# Patient Record
Sex: FEMALE | Race: WHITE | Employment: OTHER | URBAN - METROPOLITAN AREA
[De-identification: names, ages, dates, MRNs, and addresses within clinical notes are randomized per-mention and may not be internally consistent; named-entity substitution may affect disease eponyms.]

---

## 2017-02-24 ENCOUNTER — ALLIED HEALTH/NURSE VISIT (OUTPATIENT)
Dept: NURSING | Facility: CLINIC | Age: 77
End: 2017-02-24
Payer: COMMERCIAL

## 2017-02-24 DIAGNOSIS — Z23 NEED FOR PROPHYLACTIC VACCINATION AND INOCULATION AGAINST COMBINATIONS OF DISEASE: Primary | ICD-10-CM

## 2017-02-24 PROCEDURE — 90662 IIV NO PRSV INCREASED AG IM: CPT

## 2017-02-24 PROCEDURE — G0008 ADMIN INFLUENZA VIRUS VAC: HCPCS

## 2017-02-24 PROCEDURE — 99207 ZZC NO CHARGE NURSE ONLY: CPT

## 2017-02-24 NOTE — MR AVS SNAPSHOT
After Visit Summary   2/24/2017    Lawrence Pemberton    MRN: [id number]           Patient Information     Date Of Birth          1940        Visit Information        Provider Department      2/24/2017 9:30 AM UP NURSE [facility]        Today's Diagnoses     Need for prophylactic vaccination and inoculation against combinations of disease    -  1       Follow-ups after your visit        Who to contact     If you have questions or need follow up information about today's clinic visit or your schedule please contact [facility] directly at [phone].  Normal or non-critical lab and imaging results will be communicated to you by Off Grid Electrichart, letter or phone within 4 business days after the clinic has received the results. If you do not hear from us within 7 days, please contact the clinic through Fuelmaxx Inct or phone. If you have a critical or abnormal lab result, we will notify you by phone as soon as possible.  Submit refill requests through Guzu or call your pharmacy and they will forward the refill request to us. Please allow 3 business days for your refill to be completed.          Additional Information About Your Visit        MyChart Information     Guzu gives you secure access to your electronic health record. If you see a primary care provider, you can also send messages to your care team and make appointments. If you have questions, please call your primary care clinic.  If you do not have a primary care provider, please call [phone] and they will assist you.        Care EveryWhere ID     This is your Care EveryWhere ID. This could be used by other organizations to access your [place] medical records  [id number]         Blood Pressure from Last 3 Encounters:   07/19/16 125/79   08/11/15 126/82   05/19/15 126/66    Weight from Last 3 Encounters:   07/19/16 157 lb (71.2 kg)   08/11/15 158 lb 4.8 oz (71.8 kg)   05/19/15 162 lb 4.8 oz (73.6 kg)              We  Performed the Following     FLU VACCINE, INCREASED ANTIGEN, PRESV FREE        Primary Care Provider Office Phone # Fax #    Roberta Delong [phone]       [facility] [address]  [facility] [address]        Thank you!     Thank you for choosing [facility] NURSE  for your care. Our goal is always to provide you with excellent care. Hearing back from our patients is one way we can continue to improve our services. Please take a few minutes to complete the written survey that you may receive in the mail after your visit with us. Thank you!             Your Updated Medication List - Protect others around you: Learn how to safely use, store and throw away your medicines at www.disposemymeds.org.          This list is accurate as of: 2/24/17 10:15 AM.  Always use your most recent med list.                   Brand Name Dispense Instructions for use    alendronate 35 MG tablet    FOSAMAX    12 tablet    Take 1 tablet (35 mg) by mouth every 7 days Take with over 8 ounces water and stay upright for at least 30 minutes after dose.  Take at least 30-60 minutes before breakfast       atorvastatin 20 MG tablet    LIPITOR    90 tablet    TAKE 1 TABLET BY MOUTH EVERY DAY       fluocinonide 0.05 % ointment    LIDEX    30 g    Apply sparingly to affected area twice daily for 14 days.  Do not apply to face.       PARoxetine 20 MG tablet    PAXIL    45 tablet    Take 0.5 tablets (10 mg) by mouth every morning

## 2017-02-24 NOTE — NURSING NOTE

## 2017-05-15 DIAGNOSIS — F33.1 MAJOR DEPRESSIVE DISORDER, RECURRENT, MODERATE (H): ICD-10-CM

## 2017-05-15 NOTE — TELEPHONE ENCOUNTER
Paxil     Last Written Prescription Date: 07/19/2016  Last Fill Quantity: 45, # refills: 3  Last Office Visit with [facility] primary care provider:  07/19/2016        Last PHQ-9 score on record=   PHQ-9 SCORE 7/19/2016   Total Score -   Total Score 0

## 2017-05-16 RX ORDER — PAROXETINE 20 MG/1
TABLET, FILM COATED ORAL
Qty: 45 TABLET | Refills: 3 | OUTPATIENT
Start: 2017-05-16

## 2017-09-01 DIAGNOSIS — E78.5 HYPERLIPIDEMIA WITH TARGET LDL LESS THAN 130: ICD-10-CM

## 2017-09-01 RX ORDER — ATORVASTATIN CALCIUM 20 MG/1
TABLET, FILM COATED ORAL
Qty: 30 TABLET | Refills: 0 | Status: SHIPPED | OUTPATIENT
Start: 2017-09-01 | End: 2017-10-14

## 2017-09-01 NOTE — TELEPHONE ENCOUNTER
Medication is being filled for 1 time refill only due to:  Patient needs to be seen because it has been more than one year since last visit.   Due for physical and fasting labs.  Dianna Lunsford RN    Atorvastatin  Last Written Prescription Date: 9/26/2016  Last Fill Quantity: 90, # refills: 2  Last Office Visit with [facility] or [facility] prescribing provider: 7/19/2016       Lab Results   Component Value Date    CHOL 159 08/05/2016     Lab Results   Component Value Date    HDL 48 08/05/2016     Lab Results   Component Value Date    LDL 93 08/05/2016     Lab Results   Component Value Date    TRIG 88 08/05/2016     Lab Results   Component Value Date    CHOLHDLRATIO 3.3 08/11/2015

## 2017-09-06 ENCOUNTER — TRANSFERRED RECORDS (OUTPATIENT)
Dept: HEALTH INFORMATION MANAGEMENT | Facility: CLINIC | Age: 77
End: 2017-09-06

## 2017-09-27 ENCOUNTER — OFFICE VISIT (OUTPATIENT)
Dept: FAMILY MEDICINE | Facility: CLINIC | Age: 77
End: 2017-09-27
Payer: COMMERCIAL

## 2017-09-27 ENCOUNTER — THERAPY VISIT (OUTPATIENT)
Dept: PHYSICAL THERAPY | Facility: CLINIC | Age: 77
End: 2017-09-27
Payer: COMMERCIAL

## 2017-09-27 VITALS
SYSTOLIC BLOOD PRESSURE: 128 MMHG | TEMPERATURE: 97.7 F | HEIGHT: 65 IN | DIASTOLIC BLOOD PRESSURE: 71 MMHG | WEIGHT: 156.1 LBS | HEART RATE: 77 BPM | BODY MASS INDEX: 26.01 KG/M2 | OXYGEN SATURATION: 97 %

## 2017-09-27 DIAGNOSIS — F33.1 MAJOR DEPRESSIVE DISORDER, RECURRENT, MODERATE (H): ICD-10-CM

## 2017-09-27 DIAGNOSIS — S29.012A UPPER BACK STRAIN, INITIAL ENCOUNTER: ICD-10-CM

## 2017-09-27 DIAGNOSIS — Z23 NEED FOR VACCINATION: ICD-10-CM

## 2017-09-27 DIAGNOSIS — M54.2 CERVICALGIA: ICD-10-CM

## 2017-09-27 DIAGNOSIS — M25.512 ACUTE PAIN OF LEFT SHOULDER: Primary | ICD-10-CM

## 2017-09-27 PROCEDURE — G8981 BODY POS CURRENT STATUS: HCPCS | Mod: GP | Performed by: PHYSICAL THERAPIST

## 2017-09-27 PROCEDURE — 97161 PT EVAL LOW COMPLEX 20 MIN: CPT | Mod: GP | Performed by: PHYSICAL THERAPIST

## 2017-09-27 PROCEDURE — 90662 IIV NO PRSV INCREASED AG IM: CPT | Performed by: FAMILY MEDICINE

## 2017-09-27 PROCEDURE — 97140 MANUAL THERAPY 1/> REGIONS: CPT | Mod: GP | Performed by: PHYSICAL THERAPIST

## 2017-09-27 PROCEDURE — 99214 OFFICE O/P EST MOD 30 MIN: CPT | Mod: 25 | Performed by: FAMILY MEDICINE

## 2017-09-27 PROCEDURE — 97110 THERAPEUTIC EXERCISES: CPT | Mod: GP | Performed by: PHYSICAL THERAPIST

## 2017-09-27 PROCEDURE — G8982 BODY POS GOAL STATUS: HCPCS | Mod: GP | Performed by: PHYSICAL THERAPIST

## 2017-09-27 PROCEDURE — G0008 ADMIN INFLUENZA VIRUS VAC: HCPCS | Performed by: FAMILY MEDICINE

## 2017-09-27 RX ORDER — PAROXETINE 20 MG/1
10 TABLET, FILM COATED ORAL EVERY MORNING
Qty: 45 TABLET | Refills: 3 | Status: SHIPPED | OUTPATIENT
Start: 2017-09-27 | End: 2017-12-20

## 2017-09-27 ASSESSMENT — PATIENT HEALTH QUESTIONNAIRE - PHQ9: SUM OF ALL RESPONSES TO PHQ QUESTIONS 1-9: 2

## 2017-09-27 NOTE — PROGRESS NOTES
SUBJECTIVE:   Lawrence Pemberton is a 76 year old female who presents to clinic today for the following health issues:      She reports the pain from  Left upper back- from  Neck to left shoulder, sharp pain, intermitenet  Has taken ibuprofen and ice and that seem to relief some of it  Started gradually after experiencing upper back strain and was twisting a lot rosalina at bedtime  recent long distance Ride in the car to [place]- did not help much   Lying down on left shoulder at night makes it worse  Reaching above the shoulder does not make it worse  The onset of shoulder pain coincide with mid upper back pain a few weeks prior  Then was twisting in bed more to find a comfortable position and feels might have hurt the should er pain  Moving the shoulder does not make it works and rather makes it better    She reports she is very active  Twice a week water aerobic with weights  Often biking during summer  And stays active in winter with cross country skiing        She reports ,she had PHYSICAL THERAPY for lumbar pain that helped but never got any for upper back    Shoulder Pain      Duration: x3 weeks    Description (location/character/radiation): LT Shoulder     Intensity:  moderate, severe at night     Accompanying signs and symptoms: shoulder more painful when sitting and lying down, tingling in LT hand radiating to LT elbow      History (similar episodes/previous evaluation): None with shoulder, has had MRI for previous back problems     Precipitating or alleviating factors: None    Therapies tried and outcome: ibuprofen and ice - helps with pain briefly         PROBLEMS TO ADD ON...    Problem list and histories reviewed & adjusted, as indicated.  Additional history: as documented    Labs reviewed in EPIC    Reviewed and updated as needed this visit by clinical staff     Reviewed and updated as needed this visit by Provider         ROS: has been on paxil for many years but lately taiking everyother day or once in 2-3  "days . paxil has helped with depression .  Constitutional, HEENT, cardiovascular, pulmonary, gi and gu systems are negative, except as otherwise noted.      OBJECTIVE:   /71  Pulse 77  Temp 97.7  F (36.5  C) (Oral)  Ht 5' 4.75\" (1.645 m)  Wt 156 lb 1.6 oz (70.8 kg)  SpO2 97%  BMI 26.18 kg/m2  Body mass index is 26.18 kg/(m^2).  GENERAL: healthy, alert and no distress  NECK: no adenopathy, no asymmetry, masses, or scars and thyroid normal to palpation  RESP: lungs clear to auscultation - no rales, rhonchi or wheezes  CV: regular rate and rhythm, normal S1 S2, no S3 or S4, no murmur, click or rub, no peripheral edema and peripheral pulses strong  ABDOMEN: soft, nontender, no hepatosplenomegaly, no masses and bowel sounds normal  Focal left shoulder exam: no deformity. Good ROM. No weakness . Good ROM  Negative nerve impingement sign  Rest of the MS: no gross musculoskeletal defects noted, no edema      ASSESSMENT/PLAN:     (M25.512) Acute pain of left shoulder  (primary encounter diagnosis)  (S29.012A) Upper back strain, initial encounter  Plan: not typically rotator cuff  Most likely strain- and PHYSICAL THERAPY should be helpful  [facility] PT, HAND, AND CHIROPRACTIC REFERRAL      Start PHYSICAL THERAPY   Follow up if not better  Continue with ice and ibuprofen as needed          Depression  PHQ-9 SCORE 5/19/2015 7/19/2016 9/27/2017   Total Score 0 - -   Total Score - 0 2     Takes paxil 1/2 tab in 2-3 days and it helps \"easier to live with\"  We discussed the benefit of taking medications consistently- she reports she needs refill and its sent to her pharmacy  And she is encouraged to follow up with primary care physicain           Paige Zamorano MD  [facility]  "

## 2017-09-27 NOTE — MR AVS SNAPSHOT
After Visit Summary   9/27/2017    Lawrence Pemberton    MRN: [id number]           Patient Information     Date Of Birth          1940        Visit Information        Provider Department      9/27/2017 12:40 PM Ivone Roman, PT [facility]        Today's Diagnoses     Acute pain of left shoulder    -  1    Cervicalgia           Follow-ups after your visit        Your next 10 appointments already scheduled     Oct 04, 2017  9:30 AM CDT   HEMANT Spine with Ivone Roman PT   [facility] ([facility]  )    [address]  [facility] [address]   [phone]            Oct 12, 2017 12:00 PM CDT   HEMANT Spine with Ivone Roman PT   [facility] ([facility]  )    [address]  [facility] [address]   [phone]            Oct 23, 2017  3:20 PM CDT   HEMANT Spine with Ivone Roman PT   [facility] ([facility]  )    [address]  [facility] [address]   [phone]              Who to contact     If you have questions or need follow up information about today's clinic visit or your schedule please contact [facility] PH[facility] THERAP[facility] directly at [phone].  Normal or non-critical lab and imaging results will be communicated to you by DataGravityhart, letter or phone within 4 business days after the clinic has received the results. If you do not hear from us within 7 days, please contact the clinic through DataGravityhart or phone. If you have a critical or abnormal lab result, we will notify you by phone as soon as possible.  Submit refill requests through [employer] or call your pharmacy and they will forward the refill request to us. Please allow 3 business days for your refill to be completed.          Additional Information About Your Visit        [facility]  Information     Yael gives you secure access to your electronic health record. If you see a primary care provider, you can also send messages to your care team and make appointments. If you have questions, please call your primary care clinic.  If you do not have a primary care provider, please call [phone] and they will assist you.        Care EveryWhere ID     This is your Care EveryWhere ID. This could be used by other organizations to access your [facility] medical records  [id number]         Blood Pressure from Last 3 Encounters:   09/27/17 128/71   07/19/16 125/79   08/11/15 126/82    Weight from Last 3 Encounters:   09/27/17 70.8 kg (156 lb 1.6 oz)   07/19/16 71.2 kg (157 lb)   08/11/15 71.8 kg (158 lb 4.8 oz)              We Performed the Following     HC PT EVAL, LOW COMPLEXITY     [facility] CERT REPORT     [facility] INITIAL EVAL REPORT     MANUAL THER TECH,1+REGIONS,EA 15 MIN     THERAPEUTIC EXERCISES          Where to get your medicines      These medications were sent to [id number] - [place] [address]     Phone:  [phone]     PARoxetine 20 MG tablet          Primary Care Provider Office Phone # Fax #    Roberta Delong [phone] [address] [address]        Equal Access to Services     FABIENNE ENNIS AH: Hadii aad ku hadasho Soomaali, waaxda luqadaha, qaybta kaalmada adeegyada, leah forrest haymatty cohen . So [facility] [phone].    ATENCIÓN: Si habla español, tiene a irving disposición servicios gratuitos de asistencia lingüística. Je al [phone].    We comply with applicable federal civil rights laws and [place] laws. We do not discriminate on the basis of race, color, national origin, age, disability sex, sexual orientation or gender identity.            Thank you!     Thank you for choosing [facility] PHYSICAL THERAPY  for your  care. Our goal is always to provide you with excellent care. Hearing back from our patients is one way we can continue to improve our services. Please take a few minutes to complete the written survey that you may receive in the mail after your visit with us. Thank you!             Your Updated Medication List - Protect others around you: Learn how to safely use, store and throw away your medicines at www.disposemymeds.org.          This list is accurate as of: 9/27/17  2:27 PM.  Always use your most recent med list.                   Brand Name Dispense Instructions for use Diagnosis    alendronate 35 MG tablet    FOSAMAX    12 tablet    Take 1 tablet (35 mg) by mouth every 7 days Take with over 8 ounces water and stay upright for at least 30 minutes after dose.  Take at least 30-60 minutes before breakfast    Osteopenia       atorvastatin 20 MG tablet    LIPITOR    30 tablet    TAKE 1 TABLET BY MOUTH EVERY DAY    Hyperlipidemia with target LDL less than 130       fluocinonide 0.05 % ointment    LIDEX    30 g    Apply sparingly to affected area twice daily for 14 days.  Do not apply to face.    Chigger bite       PARoxetine 20 MG tablet    PAXIL    45 tablet    Take 0.5 tablets (10 mg) by mouth every morning    Major depressive disorder, recurrent, moderate (H)

## 2017-09-27 NOTE — MR AVS SNAPSHOT
After Visit Summary   9/27/2017    Lawrence Pemberton    MRN: [id number]           Patient Information     Date Of Birth          1940        Visit Information        Provider Department      9/27/2017 11:00 AM Paige Zamorano MD [facility]        Today's Diagnoses     Acute pain of left shoulder    -  1    Upper back strain, initial encounter        Major depressive disorder, recurrent, moderate (H)        Need for vaccination          Care Instructions    PHYSICAL THERAPY [facility], [phone]     Start PHYSICAL THERAPY   Follow up if not better  Continue with ice and ibuprofen as needed            Follow-ups after your visit        Additional Services     [facility] PT, HAND, AND CHIROPRACTIC REFERRAL       **This order will print in the [facility] Scheduling Office**    Physical Therapy, Hand Therapy and Chiropractic Care are available through:    *[facility]  *[facility]  *[facility]    Call one number to schedule at any of the above locations: [phone].    Your provider has referred you to: Physical Therapy at [facility] or [facility]    Indication/Reason for Referral: left shoulder pain- more in upper shoulder posteriorly  Also mid back strain   Onset of Illness: 1 month  Therapy Orders: Evaluate and Treat  Special Programs: None  Special Request: None    Nomi Mosher      Additional Comments for the Therapist or Chiropractor:     Please be aware that coverage of these services is subject to the terms and limitations of your health insurance plan.  Call member services at your health plan with any benefit or coverage questions.      Please bring the following to your appointment:    *Your personal calendar for scheduling future appointments  *Comfortable clothing                  Your next 10 appointments already scheduled     Oct 04, 2017  9:30 AM CDT   HEMANT Spine with Ivone Roman PT   [facility] ([facility]  )    [address]  [facility] [address]   [phone]            Oct 12, 2017 12:00 PM CDT   HEMANT Spine with Ivone Roman, PT   [facility] ([facility]  )    [address]  [facility] [address]   [phone]            Oct 23, 2017  3:20 PM CDT   HEMANT Spine with Ivone Roman, PT   [facility] ([facility]  )    [address]  [facility] [address]   [phone]              Who to contact     If you have questions or need follow up information about today's clinic visit or your schedule please contact [facility] directly at [phone].  Normal or non-critical lab and imaging results will be communicated to you by LoiLohart, letter or phone within 4 business days after the clinic has received the results. If you do not hear from us within 7 days, please contact the clinic through [employer] or phone. If you have a critical or abnormal lab result, we will notify you by phone as soon as possible.  Submit refill requests through [employer] or call your pharmacy and they will forward the refill request to us. Please allow 3 business days for your refill to be completed.          Additional Information About Your Visit        [employer] Information     [employer] gives you secure access to your electronic health record. If you see a primary care provider, you can also send messages to your care team and make appointments. If you have questions, please call your primary care clinic.  If you do not have a primary care provider, please call [phone] and they will assist you.        Care EveryWhere ID     This is your Care EveryWhere ID. This could be used by other organizations to access your [place] medical records  [id number]        Your Vitals Were     Pulse Temperature Height Pulse Oximetry BMI (Body Mass Index)       77 97.7  " F (36.5  C) (Oral) 5' 4.75\" (1.645 m) 97% 26.18 kg/m2        Blood Pressure from Last 3 Encounters:   09/27/17 128/71   07/19/16 125/79   08/11/15 126/82    Weight from Last 3 Encounters:   09/27/17 156 lb 1.6 oz (70.8 kg)   07/19/16 157 lb (71.2 kg)   08/11/15 158 lb 4.8 oz (71.8 kg)              We Performed the Following     FLU VACCINE, INCREASED ANTIGEN, PRESV FREE     [facility] PT, HAND, AND CHIROPRACTIC REFERRAL          Where to get your medicines      These medications were sent to [id number] - [place] [id number] [place] [address]  [address] [place] [address]     Phone:  [phone]     PARoxetine 20 MG tablet          Primary Care Provider Office Phone # Fax #    Roberta Delong [phone] [address] [address]        Equal Access to Services     [facility]: Hadii aad ku hadasho Soomaali, waaxda luqadaha, qaybta kaalmada adeegyayumiko, leah cohen . So [facility] [phone].    ATENCIÓN: Si habla español, tiene a irving disposición servicios gratuitos de asistencia lingüística. [facility] [phone].    We comply with applicable federal civil rights laws and [place] laws. We do not discriminate on the basis of race, color, national origin, age, disability sex, sexual orientation or gender identity.            Thank you!     Thank you for choosing [facility] [place]  for your care. Our goal is always to provide you with excellent care. Hearing back from our patients is one way we can continue to improve our services. Please take a few minutes to complete the written survey that you may receive in the mail after your visit with us. Thank you!             Your Updated Medication List - Protect others around you: Learn how to safely use, store and throw away your medicines at www.disposemymeds.org.          This list is accurate as of: 9/27/17  6:13 PM.  Always use your most recent med list.                   " Brand Name Dispense Instructions for use Diagnosis    alendronate 35 MG tablet    FOSAMAX    12 tablet    Take 1 tablet (35 mg) by mouth every 7 days Take with over 8 ounces water and stay upright for at least 30 minutes after dose.  Take at least 30-60 minutes before breakfast    Osteopenia       atorvastatin 20 MG tablet    LIPITOR    30 tablet    TAKE 1 TABLET BY MOUTH EVERY DAY    Hyperlipidemia with target LDL less than 130       fluocinonide 0.05 % ointment    LIDEX    30 g    Apply sparingly to affected area twice daily for 14 days.  Do not apply to face.    Chigger bite       PARoxetine 20 MG tablet    PAXIL    45 tablet    Take 0.5 tablets (10 mg) by mouth every morning    Major depressive disorder, recurrent, moderate (H)

## 2017-09-27 NOTE — NURSING NOTE
"Chief Complaint   Patient presents with     Shoulder Pain       Initial /71  Pulse 77  Temp 97.7  F (36.5  C) (Oral)  Ht 5' 4.75\" (1.645 m)  Wt 156 lb 1.6 oz (70.8 kg)  SpO2 97%  BMI 26.18 kg/m2 Estimated body mass index is 26.18 kg/(m^2) as calculated from the following:    Height as of this encounter: 5' 4.75\" (1.645 m).    Weight as of this encounter: 156 lb 1.6 oz (70.8 kg).  Medication Reconciliation: complete      Health Maintenance that is potentially due pending provider review:  PHQ9    Completing PHQ9 today.    BURAK Armando  "

## 2017-09-27 NOTE — PROGRESS NOTES
"  SUBJECTIVE:   Lawrence Pemberton is a 76 year old female who presents to clinic today for the following health issues:  {Provider please address medication reconciliation discrepancies--rooming staff please delete if no med/rec issues}    {Superlists:151604}    {additional problems for provider to add:091080}    Problem list and histories reviewed & adjusted, as indicated.  Additional history: {NONE - AS DOCUMENTED:427611::\"as documented\"}    {HIST REVIEW/ LINKS 2:310204}    Reviewed and updated as needed this visit by clinical staff  Allergies  Meds       Reviewed and updated as needed this visit by Provider         {PROVIDER CHARTING PREFERENCE:773326}  "

## 2017-09-27 NOTE — PATIENT INSTRUCTIONS
PHYSICAL THERAPY [facility], [phone]     Start PHYSICAL THERAPY   Follow up if not better  Continue with ice and ibuprofen as needed

## 2017-09-27 NOTE — PROGRESS NOTES
[facility] Initial Evaluation  Subjective:    Patient is a 76 year old female presenting with rehab left shoulder hpi. The history is provided by the patient. No  was used.   Lawrence Pemberton is a 76 year old female with a left shoulder condition.  Condition occurred with:  Other.    This is a new condition  Left shoulder and upper back pain began on a long car trip a few weeks ago. Was also doing some cleaning and this aggravated as well.  Patient saw MD for this issue today, 9/27/2017.  Patient reports pain in the mid back tp start and then developed left shoulder pain.  Mostly has pain with lying on left side or on her back.  Has been on her bike 3-4 days a week and doing water exercises.  Really no pain on the bike.  Taking ibuprofen as needed and icing well.  Is planning a trip to [place] in March.  Has had a little tingling in her left hand, index finger.  Still doing some computer work for her .  .    Patient reports pain:  Upper trap.  Radiates to:  Cervical and hand.  Pain is described as shooting and aching and is intermittent and reported as 7/10.  Associated symptoms:  Numbness. Pain is the same all the time.  Symptoms are exacerbated by rest and certain positions and relieved by nothing.  Since onset symptoms are gradually worsening.  Special testing: none.                                                    Objective:    Standing Alignment:    Cervical/Thoracic:  Forward head (upper thoracic kyphosis)                                    Cervical/Thoracic Evaluation    AROM:  AROM Cervical:    Flexion:          Pain into left upper back  Extension:       Pain down left arm to left index finger at end range of motion  Rotation:         Left: limited to 50%     Right: limited to 50 %  Side Bend:      Left:     Right:       Headaches: none  Cervical Myotomes:  normal                  DTR's:  not assessed          Cervical Dermatomes:   normal                    Cervical Palpation:    Tenderness present at Left:    Upper Trap; Levator and Erector Spinae      Cervical Stability/Joint Clearing:        Negative:ALAR Ligament and TLA AP  Spinal Segmental Conclusions:    Level:  Hypo at T1, T2, T3 and T4             Shoulder Evaluation:  ROM:  AROM:  : no pain, slight limitation at end range flexion and abduction.                                  Strength:  normal                      Stability Testing:  not assessed      Special Tests:  not assessed                                           General     ROS    Assessment/Plan:      Patient is a 76 year old female with cervical complaints.    Patient has the following significant findings with corresponding treatment plan.                Diagnosis 1:  Shoulder/upper back pain  Pain -  hot/cold therapy, manual therapy, self management, education and home program  Decreased ROM/flexibility - manual therapy, therapeutic exercise and home program  Decreased function - therapeutic activities and home program  Impaired posture - neuro re-education    Therapy Evaluation Codes:   1) History comprised of:   Personal factors that impact the plan of care:      Age.    Comorbidity factors that impact the plan of care are:      Osteoarthritis.     Medications impacting care: None.  2) Examination of Body Systems comprised of:   Body structures and functions that impact the plan of care:      Cervical spine.   Activity limitations that impact the plan of care are:      Cooking, Driving, Reading/Computer work, Sports, Sleeping and Laying down.  3) Clinical presentation characteristics are:   Stable/Uncomplicated.  4) Decision-Making    Low complexity using standardized patient assessment instrument and/or measureable assessment of functional outcome.  Cumulative Therapy Evaluation is: Low complexity.    Previous and current functional limitations:  (See Goal Flow Sheet for this information)    Short term and Long term  goals: (See Goal Flow Sheet for this information)     Communication ability:  Patient appears to be able to clearly communicate and understand verbal and written communication and follow directions correctly.  Treatment Explanation - The following has been discussed with the patient:   RX ordered/plan of care  Anticipated outcomes  Possible risks and side effects  This patient would benefit from PT intervention to resume normal activities.   Rehab potential is good.    Frequency:  1 X week, once daily  Duration:  for 6 weeks  Discharge Plan:  Independent in home treatment program.  Reach maximal therapeutic benefit.    Please refer to the daily flowsheet for treatment today, total treatment time and time spent performing 1:1 timed codes.

## 2017-09-28 NOTE — PROGRESS NOTES
Subjective:    Patient is a 76 year old female presenting with rehab left ankle/foot hpi.                                      Pertinent medical history includes:  Osteoarthritis.  Medical allergies: no.  Other surgeries include:  Orthopedic surgery.  Current medications:  Meds to increase bone density and anti-depressants.  Current occupation is Semi retired  .                                    Objective:    System    Physical Exam    General     ROS    Assessment/Plan:

## 2017-10-04 ENCOUNTER — THERAPY VISIT (OUTPATIENT)
Dept: PHYSICAL THERAPY | Facility: CLINIC | Age: 77
End: 2017-10-04
Payer: COMMERCIAL

## 2017-10-04 DIAGNOSIS — M25.512 ACUTE PAIN OF LEFT SHOULDER: ICD-10-CM

## 2017-10-04 DIAGNOSIS — M54.2 CERVICALGIA: ICD-10-CM

## 2017-10-04 PROCEDURE — 97110 THERAPEUTIC EXERCISES: CPT | Mod: GP | Performed by: PHYSICAL THERAPIST

## 2017-10-04 PROCEDURE — 97140 MANUAL THERAPY 1/> REGIONS: CPT | Mod: GP | Performed by: PHYSICAL THERAPIST

## 2017-10-12 ENCOUNTER — THERAPY VISIT (OUTPATIENT)
Dept: PHYSICAL THERAPY | Facility: CLINIC | Age: 77
End: 2017-10-12
Payer: COMMERCIAL

## 2017-10-12 DIAGNOSIS — M25.512 ACUTE PAIN OF LEFT SHOULDER: ICD-10-CM

## 2017-10-12 DIAGNOSIS — M54.2 CERVICALGIA: ICD-10-CM

## 2017-10-12 PROCEDURE — 97110 THERAPEUTIC EXERCISES: CPT | Mod: GP | Performed by: PHYSICAL THERAPIST

## 2017-10-12 PROCEDURE — 97140 MANUAL THERAPY 1/> REGIONS: CPT | Mod: GP | Performed by: PHYSICAL THERAPIST

## 2017-10-12 NOTE — MR AVS SNAPSHOT
After Visit Summary   10/12/2017    Lawrence Pemberton    MRN: [id number]           Patient Information     Date Of Birth          1940        Visit Information        Provider Department      10/12/2017 12:00 PM Ivone Roman, PT [facility] Physical Therapy        Today's Diagnoses     Acute pain of left shoulder        Cervicalgia           Follow-ups after your visit        Your next 10 appointments already scheduled     Oct 23, 2017  3:20 PM CDT   HEMANT Spine with Ivone Roman PT   [facility] Therapy ([facility]  )    [address]  [facility] [address] [phone]            Oct 30, 2017  3:20 PM CDT   HEMANT Spine with Ivone Roman PT   [facility] ([facility]  )    [address]  [facility] [id number] [phone]              Who to contact     If you have questions or need follow up information about today's clinic visit or your schedule please contact [facility] PH[facility] THERAP[facility] directly at [phone].  Normal or non-critical lab and imaging results will be communicated to you by [employer], letter or phone within 4 business days after the clinic has received the results. If you do not hear from us within 7 days, please contact the clinic through [employer] or phone. If you have a critical or abnormal lab result, we will notify you by phone as soon as possible.  Submit refill requests through [employer] or call your pharmacy and they will forward the refill request to us. Please allow 3 business days for your refill to be completed.          Additional Information About Your Visit        [employer] Information     [employer] gives you secure access to your electronic health record. If you see a primary care provider, you can also send messages to your care team and make appointments. If you have questions, please call  your primary care clinic.  If you do not have a primary care provider, please call [phone] and they will assist you.        Care EveryWhere ID     This is your Care EveryWhere ID. This could be used by other organizations to access your [place] medical records  [id number]         Blood Pressure from Last 3 Encounters:   09/27/17 128/71   07/19/16 125/79   08/11/15 126/82    Weight from Last 3 Encounters:   09/27/17 70.8 kg (156 lb 1.6 oz)   07/19/16 71.2 kg (157 lb)   08/11/15 71.8 kg (158 lb 4.8 oz)              Today, you had the following     No orders found for display       Primary Care Provider Office Phone # Fax #    Roberta Delong [phone] [address] [address]        Equal Access to Services     [facility]: Hadii aad ku hadasho Soomaali, waaxda luqadaha, qaybta kaalmada adeegyada, waxay adriane haykatien zaheer cohen . So [facility] [phone].    ATENCIÓN: Si habla español, tiene a irving disposición servicios gratuitos de asistencia lingüística. Je al [phone].    We comply with applicable federal civil rights laws and [place] laws. We do not discriminate on the basis of race, color, national origin, age, disability, sex, sexual orientation, or gender identity.            Thank you!     Thank you for choosing [facility]  for your care. Our goal is always to provide you with excellent care. Hearing back from our patients is one way we can continue to improve our services. Please take a few minutes to complete the written survey that you may receive in the mail after your visit with us. Thank you!             Your Updated Medication List - Protect others around you: Learn how to safely use, store and throw away your medicines at www.disposemymeds.org.          This list is accurate as of: 10/12/17  1:51 PM.  Always use your most recent med list.                   Brand Name Dispense  Instructions for use Diagnosis    alendronate 35 MG tablet    FOSAMAX    12 tablet    Take 1 tablet (35 mg) by mouth every 7 days Take with over 8 ounces water and stay upright for at least 30 minutes after dose.  Take at least 30-60 minutes before breakfast    Osteopenia       atorvastatin 20 MG tablet    LIPITOR    30 tablet    TAKE 1 TABLET BY MOUTH EVERY DAY    Hyperlipidemia with target LDL less than 130       fluocinonide 0.05 % ointment    LIDEX    30 g    Apply sparingly to affected area twice daily for 14 days.  Do not apply to face.    Chigger bite       PARoxetine 20 MG tablet    PAXIL    45 tablet    Take 0.5 tablets (10 mg) by mouth every morning    Major depressive disorder, recurrent, moderate (H)

## 2017-10-14 DIAGNOSIS — M85.80 OSTEOPENIA: ICD-10-CM

## 2017-10-14 DIAGNOSIS — E78.5 HYPERLIPIDEMIA WITH TARGET LDL LESS THAN 130: ICD-10-CM

## 2017-10-16 NOTE — TELEPHONE ENCOUNTER
Left non detailed VM for pt asking that they callback and schedule (physical with PCP)  Margaret LEE RN    Pending Prescriptions:                       Disp   Refills    alendronate (FOSAMAX) 35 MG tablet [Pharma*12 tab*3        Sig: TAKE 1 TABLET BY MOUTH EACH WEEK.TAKE WITH [id number]           BEFORE FOOD,DRINK& MEDS. STAY UPRIGHT 30MIN    atorvastatin (LIPITOR) 20 MG tablet [Pharm*30 tab*0        Sig: TAKE 1 TABLET BY MOUTH EVERY DAY    Alendronate    Last Written Prescription Date: 8/18/2016  Last Fill Quantity: 12, # refills: 3  Last Office Visit with [facility], [facility] or [facility] prescribing provider: 9/27/2017       DEXA Scan:  Last order of DX HIP/PELVIS/SPINE was found on 6/1/2015 from Hospital Encounter on 6/1/2015     No order of DX HIP/PELVIS/SPINE W LAT FRACTION ANALYSIS is found.       Creatinine   Date Value Ref Range Status   08/05/2016 0.80 0.52 - 1.04 mg/dL Final       Atorvastatin       Last Written Prescription Date: 9/1/2017  Last Fill Quantity: 30, # refills: 0    Last Office Visit with [facility], [facility] or [facility] prescribing provider:  9/27/2017   Future Office Visit:      Cholesterol   Date Value Ref Range Status   08/05/2016 159 <200 mg/dL Final     HDL Cholesterol   Date Value Ref Range Status   08/05/2016 48 (L) >49 mg/dL Final     LDL Cholesterol Calculated   Date Value Ref Range Status   08/05/2016 93 <100 mg/dL Final     Comment:     Desirable:       <100 mg/dl     Triglycerides   Date Value Ref Range Status   08/05/2016 88 <150 mg/dL Final     Cholesterol/HDL Ratio   Date Value Ref Range Status   08/11/2015 3.3 0.0 - 5.0 Final     ALT   Date Value Ref Range Status   08/05/2016 34 0 - 50 U/L Final

## 2017-10-19 ENCOUNTER — TELEPHONE (OUTPATIENT)
Dept: FAMILY MEDICINE | Facility: CLINIC | Age: 77
End: 2017-10-19

## 2017-10-19 RX ORDER — ALENDRONATE SODIUM 35 MG/1
TABLET ORAL
Qty: 4 TABLET | Refills: 0 | Status: SHIPPED | OUTPATIENT
Start: 2017-10-19 | End: 2017-11-14

## 2017-10-19 RX ORDER — ATORVASTATIN CALCIUM 20 MG/1
TABLET, FILM COATED ORAL
Qty: 30 TABLET | Refills: 0 | Status: SHIPPED | OUTPATIENT
Start: 2017-10-19 | End: 2017-11-17

## 2017-10-19 NOTE — TELEPHONE ENCOUNTER
Medication is being filled for 1 time refill only due to:  upcoming appt   Margaret LEE RN    Next 5 appointments (look out 90 days)     Nov 14, 2017  7:30 AM CST   PHYSICAL with Roberta Delong MD   [facility] ([facility])    [address] [facility] [address]   [phone]

## 2017-10-23 ENCOUNTER — THERAPY VISIT (OUTPATIENT)
Dept: PHYSICAL THERAPY | Facility: CLINIC | Age: 77
End: 2017-10-23
Payer: COMMERCIAL

## 2017-10-23 DIAGNOSIS — M54.2 CERVICALGIA: ICD-10-CM

## 2017-10-23 DIAGNOSIS — M25.512 ACUTE PAIN OF LEFT SHOULDER: ICD-10-CM

## 2017-10-23 PROCEDURE — 97110 THERAPEUTIC EXERCISES: CPT | Mod: GP | Performed by: PHYSICAL THERAPIST

## 2017-10-23 PROCEDURE — 97140 MANUAL THERAPY 1/> REGIONS: CPT | Mod: GP | Performed by: PHYSICAL THERAPIST

## 2017-10-30 ENCOUNTER — THERAPY VISIT (OUTPATIENT)
Dept: PHYSICAL THERAPY | Facility: CLINIC | Age: 77
End: 2017-10-30
Payer: COMMERCIAL

## 2017-10-30 DIAGNOSIS — M25.512 ACUTE PAIN OF LEFT SHOULDER: ICD-10-CM

## 2017-10-30 DIAGNOSIS — M54.2 CERVICALGIA: ICD-10-CM

## 2017-10-30 PROCEDURE — 97535 SELF CARE MNGMENT TRAINING: CPT | Mod: GP | Performed by: PHYSICAL THERAPIST

## 2017-10-30 PROCEDURE — 97110 THERAPEUTIC EXERCISES: CPT | Mod: GP | Performed by: PHYSICAL THERAPIST

## 2017-10-30 PROCEDURE — 97112 NEUROMUSCULAR REEDUCATION: CPT | Mod: GP | Performed by: PHYSICAL THERAPIST

## 2017-10-30 NOTE — PROGRESS NOTES
Subjective:    HPI                    Objective:    System    Physical Exam    General     ROS    Assessment/Plan:      PROGRESS  REPORT    Progress reporting period is from 9/27/2017 to 10/3/2017.       SUBJECTIVE  Subjective changes noted by patient:  Feeling good today, and feels she has become stronger and has been having less tingling since the start of therapy. Therapy did not exacerbate symptoms today.    Current pain level is 3/10.     Previous pain level was 7/10.   Changes in function:  Yes, is sleeping a majority of the night, is able to complete a majority of the activities she wants, and is able to live an active lifestyle without much difficulty.  Adverse reaction to treatment or activity: None    OBJECTIVE  Changes noted in objective findings: Completed all exercises with new modifications. Patient stated understanding to all changes and to all education. Posture has improved, but forward head is still present, but patient is able to correct self. Strength in left arm is now equal to or greater than right arm other than external rotation, but this has also improved.     ASSESSMENT/PLAN  Updated problem list and treatment plan: Diagnosis 1:  Left shoulder pain  Pain -  hot/cold therapy, US, manual therapy, self management, education and home program  Decreased strength - therapeutic exercise, therapeutic activities and home program  Neurological symptoms (tingling) - postural re-education  Diagnosis 2:  Cervical pain   Pain -  hot/cold therapy, US, manual therapy, self management, education and home program  Decreased ROM/flexibility - manual therapy, therapeutic exercise and home program  Impaired posture - neuro re-education and home program  STG/LTGs have been met or progress has been made towards goals:  Yes (See Goal flow sheet completed today.)  Assessment of Progress: The patient's condition is improving.  Self Management Plans:  Patient has been instructed in a home treatment program.  Patient   has been instructed in self management of symptoms.  I have re-evaluated this patient and find that the nature, scope, duration and intensity of the therapy is appropriate for the medical condition of the patient.  Christopherig continues to require the following intervention to meet STG and LTG's:  PT    Recommendations:  This patient would benefit from continued therapy.     Frequency:  1 X week, once daily  Duration:  for 6 weeks          Please refer to the daily flowsheet for treatment today, total treatment time and time spent performing 1:1 timed codes.

## 2017-10-30 NOTE — MR AVS SNAPSHOT
After Visit Summary   10/30/2017    Lawrence Pemberton    MRN: [id number]           Patient Information     Date Of Birth          1940        Visit Information        Provider Department      10/30/2017 3:20 PM Ivone Roman PT [facility]        Today's Diagnoses     Acute pain of left shoulder        Cervicalgia           Follow-ups after your visit        Your next 10 appointments already scheduled     Nov 14, 2017  7:30 AM CST   PHYSICAL with Roberta Delong MD   [facility] ([facility])    [address] [address] [phone]              Who to contact     If you have questions or need follow up information about today's clinic visit or your schedule please contact [facility] directly at [phone].  Normal or non-critical lab and imaging results will be communicated to you by MyChart, letter or phone within 4 business days after the clinic has received the results. If you do not hear from us within 7 days, please contact the clinic through [employer] or phone. If you have a critical or abnormal lab result, we will notify you by phone as soon as possible.  Submit refill requests through [employer] or call your pharmacy and they will forward the refill request to us. Please allow 3 business days for your refill to be completed.          Additional Information About Your Visit        MyChart Information     [employer] gives you secure access to your electronic health record. If you see a primary care provider, you can also send messages to your care team and make appointments. If you have questions, please call your primary care clinic.  If you do not have a primary care provider, please call [phone] and they will assist you.        Care EveryWhere ID     This is your Care EveryWhere ID. This could be used by other organizations to access  your [place] medical records  [id number]         Blood Pressure from Last 3 Encounters:   09/27/17 128/71   07/19/16 125/79   08/11/15 126/82    Weight from Last 3 Encounters:   09/27/17 70.8 kg (156 lb 1.6 oz)   07/19/16 71.2 kg (157 lb)   08/11/15 71.8 kg (158 lb 4.8 oz)              We Performed the Following     HEMANT Progress Notes Report     Neuromuscular Re-Education     Self Care Management Training     Therapeutic Exercises        Primary Care Provider Office Phone # Fax #    Roberta Delong [phone] [address] [address]        Equal Access to Services     FABIENNE ENNIS : Hadii hermann bermudez hadasho Sohoracio, waaxda luqadaha, qaybta kaalmada adeegyada, leah cohen . So [facility] [phone].    ATENCIÓN: Si habla español, tiene a irving disposición servicios gratuitos de asistencia lingüística. Llame al [phone].    We comply with applicable federal civil rights laws and [place] laws. We do not discriminate on the basis of race, color, national origin, age, disability, sex, sexual orientation, or gender identity.            Thank you!     Thank you for choosing [facility] PHYSICAL THERAP[facility]  for your care. Our goal is always to provide you with excellent care. Hearing back from our patients is one way we can continue to improve our services. Please take a few minutes to complete the written survey that you may receive in the mail after your visit with us. Thank you!             Your Updated Medication List - Protect others around you: Learn how to safely use, store and throw away your medicines at www.disposemymeds.org.          This list is accurate as of: 10/30/17  4:26 PM.  Always use your most recent med list.                   Brand Name Dispense Instructions for use Diagnosis    alendronate 35 MG tablet    FOSAMAX    4 tablet    TAKE 1 TABLET BY MOUTH EACH WEEK.TAKE WITH [id number] BEFORE  FOOD,DRINK& MEDS. STAY UPRIGHT 30MIN    Osteopenia       atorvastatin 20 MG tablet    LIPITOR    30 tablet    TAKE 1 TABLET BY MOUTH EVERY DAY    Hyperlipidemia with target LDL less than 130       fluocinonide 0.05 % ointment    LIDEX    30 g    Apply sparingly to affected area twice daily for 14 days.  Do not apply to face.    Chigger bite       PARoxetine 20 MG tablet    PAXIL    45 tablet    Take 0.5 tablets (10 mg) by mouth every morning    Major depressive disorder, recurrent, moderate (H)

## 2017-11-14 ENCOUNTER — OFFICE VISIT (OUTPATIENT)
Dept: FAMILY MEDICINE | Facility: CLINIC | Age: 77
End: 2017-11-14
Payer: COMMERCIAL

## 2017-11-14 VITALS
BODY MASS INDEX: 25.49 KG/M2 | SYSTOLIC BLOOD PRESSURE: 116 MMHG | TEMPERATURE: 96.9 F | HEIGHT: 65 IN | WEIGHT: 153 LBS | OXYGEN SATURATION: 96 % | DIASTOLIC BLOOD PRESSURE: 73 MMHG | HEART RATE: 74 BPM

## 2017-11-14 DIAGNOSIS — C44.311 BASAL CELL CARCINOMA OF SKIN OF NOSE: ICD-10-CM

## 2017-11-14 DIAGNOSIS — F41.1 ANXIETY STATE: ICD-10-CM

## 2017-11-14 DIAGNOSIS — M47.819 FACET ARTHROPATHY OF SPINE: ICD-10-CM

## 2017-11-14 DIAGNOSIS — Z00.00 ENCOUNTER FOR ROUTINE ADULT HEALTH EXAMINATION WITHOUT ABNORMAL FINDINGS: Primary | ICD-10-CM

## 2017-11-14 DIAGNOSIS — F33.1 MAJOR DEPRESSIVE DISORDER, RECURRENT, MODERATE (H): ICD-10-CM

## 2017-11-14 DIAGNOSIS — Z23 NEED FOR TDAP VACCINATION: ICD-10-CM

## 2017-11-14 DIAGNOSIS — M85.80 OSTEOPENIA, UNSPECIFIED LOCATION: ICD-10-CM

## 2017-11-14 LAB
ALBUMIN SERPL-MCNC: 4 G/DL (ref 3.4–5)
ALP SERPL-CCNC: 74 U/L (ref 40–150)
ALT SERPL W P-5'-P-CCNC: 29 U/L (ref 0–50)
ANION GAP SERPL CALCULATED.3IONS-SCNC: 8 MMOL/L (ref 3–14)
AST SERPL W P-5'-P-CCNC: 25 U/L (ref 0–45)
BILIRUB SERPL-MCNC: 0.6 MG/DL (ref 0.2–1.3)
BUN SERPL-MCNC: 22 MG/DL (ref 7–30)
CALCIUM SERPL-MCNC: 9.4 MG/DL (ref 8.5–10.1)
CHLORIDE SERPL-SCNC: 107 MMOL/L (ref 94–109)
CHOLEST SERPL-MCNC: 163 MG/DL
CO2 SERPL-SCNC: 25 MMOL/L (ref 20–32)
CREAT SERPL-MCNC: 0.87 MG/DL (ref 0.52–1.04)
DEPRECATED CALCIDIOL+CALCIFEROL SERPL-MC: 26 UG/L (ref 20–75)
GFR SERPL CREATININE-BSD FRML MDRD: 63 ML/MIN/1.7M2
GLUCOSE SERPL-MCNC: 89 MG/DL (ref 70–99)
HDLC SERPL-MCNC: 51 MG/DL
LDLC SERPL CALC-MCNC: 95 MG/DL
NONHDLC SERPL-MCNC: 112 MG/DL
POTASSIUM SERPL-SCNC: 4.9 MMOL/L (ref 3.4–5.3)
PROT SERPL-MCNC: 7.7 G/DL (ref 6.8–8.8)
SODIUM SERPL-SCNC: 140 MMOL/L (ref 133–144)
TRIGL SERPL-MCNC: 87 MG/DL

## 2017-11-14 PROCEDURE — 80053 COMPREHEN METABOLIC PANEL: CPT | Performed by: FAMILY MEDICINE

## 2017-11-14 PROCEDURE — 80061 LIPID PANEL: CPT | Performed by: FAMILY MEDICINE

## 2017-11-14 PROCEDURE — 82306 VITAMIN D 25 HYDROXY: CPT | Performed by: FAMILY MEDICINE

## 2017-11-14 PROCEDURE — 99397 PER PM REEVAL EST PAT 65+ YR: CPT | Mod: 25 | Performed by: FAMILY MEDICINE

## 2017-11-14 PROCEDURE — 90471 IMMUNIZATION ADMIN: CPT | Performed by: FAMILY MEDICINE

## 2017-11-14 PROCEDURE — 36415 COLL VENOUS BLD VENIPUNCTURE: CPT | Performed by: FAMILY MEDICINE

## 2017-11-14 PROCEDURE — 90715 TDAP VACCINE 7 YRS/> IM: CPT | Performed by: FAMILY MEDICINE

## 2017-11-14 RX ORDER — ALENDRONATE SODIUM 35 MG/1
TABLET ORAL
Qty: 12 TABLET | Refills: 3 | Status: SHIPPED | OUTPATIENT
Start: 2017-11-14 | End: 2018-11-16

## 2017-11-14 ASSESSMENT — ANXIETY QUESTIONNAIRES
IF YOU CHECKED OFF ANY PROBLEMS ON THIS QUESTIONNAIRE, HOW DIFFICULT HAVE THESE PROBLEMS MADE IT FOR YOU TO DO YOUR WORK, TAKE CARE OF THINGS AT HOME, OR GET ALONG WITH OTHER PEOPLE: NOT DIFFICULT AT ALL
5. BEING SO RESTLESS THAT IT IS HARD TO SIT STILL: NOT AT ALL
1. FEELING NERVOUS, ANXIOUS, OR ON EDGE: SEVERAL DAYS
7. FEELING AFRAID AS IF SOMETHING AWFUL MIGHT HAPPEN: SEVERAL DAYS
3. WORRYING TOO MUCH ABOUT DIFFERENT THINGS: SEVERAL DAYS
GAD7 TOTAL SCORE: 4
6. BECOMING EASILY ANNOYED OR IRRITABLE: NOT AT ALL
2. NOT BEING ABLE TO STOP OR CONTROL WORRYING: SEVERAL DAYS

## 2017-11-14 ASSESSMENT — PATIENT HEALTH QUESTIONNAIRE - PHQ9
5. POOR APPETITE OR OVEREATING: NOT AT ALL
SUM OF ALL RESPONSES TO PHQ QUESTIONS 1-9: 0

## 2017-11-14 NOTE — MR AVS SNAPSHOT
After Visit Summary   11/14/2017    Lawrence Pemberton    MRN: [id number]           Patient Information     Date Of Birth          1940        Visit Information        Provider Department      11/14/2017 7:30 AM Roberta Delong MD [facility]        Today's Diagnoses     Encounter for routine adult health examination without abnormal findings    -  1    Facet arthropathy of spine        Osteopenia, unspecified location        Anxiety state        Major depressive disorder, recurrent, moderate (H)        Basal cell carcinoma of skin of nose        Need for Tdap vaccination          Care Instructions      Preventive Health Recommendations    Female Ages 65 +    Yearly exam:     See your health care provider every year in order to  o Review health changes.   o Discuss preventive care.    o Review your medicines if your doctor has prescribed any.      You no longer need a yearly Pap test unless you've had an abnormal Pap test in the past 10 years. If you have vaginal symptoms, such as bleeding or discharge, be sure to talk with your provider about a Pap test.      Every 1 to 2 years, have a mammogram.  If you are over 69, talk with your health care provider about whether or not you want to continue having screening mammograms.      Every 10 years, have a colonoscopy. Or, have a yearly FIT test (stool test). These exams will check for colon cancer.       Have a cholesterol test every 5 years, or more often if your doctor advises it.       Have a diabetes test (fasting glucose) every three years. If you are at risk for diabetes, you should have this test more often.       At age 65, have a bone density scan (DEXA) to check for osteoporosis (brittle bone disease).    Shots:    Get a flu shot each year.    Get a tetanus shot every 10 years.    Talk to your doctor about your pneumonia vaccines. There are now two you should receive - Pneumovax (PPSV 23) and Prevnar (PCV 13).    Talk to your  doctor about the shingles vaccine.    Talk to your doctor about the hepatitis B vaccine.    Nutrition:     Eat at least 5 servings of fruits and vegetables each day.      Eat whole-grain bread, whole-wheat pasta and brown rice instead of white grains and rice.      Talk to your provider about Calcium and Vitamin D.     Lifestyle    Exercise at least 150 minutes a week (30 minutes a day, 5 days a week). This will help you control your weight and prevent disease.      Limit alcohol to one drink per day.      No smoking.       Wear sunscreen to prevent skin cancer.       See your dentist twice a year for an exam and cleaning.      See your eye doctor every 1 to 2 years to screen for conditions such as glaucoma, macular degeneration and cataracts.          Follow-ups after your visit        Additional Services     DERMATOLOGY REFERRAL       Your provider has referred you to: [facility]: [facility] [place] [phone]  [email or web address]  [facility]: [facility] [phone]   [email or web address]    Please be aware that coverage of these services is subject to the terms and limitations of your health insurance plan.  Call member services at your health plan with any benefit or coverage questions.      Please bring the following with you to your appointment:    (1) Any X-Rays, CTs or MRIs which have been performed.  Contact the facility where they were done to arrange for  prior to your scheduled appointment.    (2) List of current medications  (3) This referral request   (4) Any documents/labs given to you for this referral                  Who to contact     If you have questions or need follow up information about today's clinic visit or your schedule please contact [facility] directly at [phone].  Normal or non-critical lab and imaging results will be communicated to you by MyChart, letter or phone within 4 business days after the clinic has  "received the results. If you do not hear from us within 7 days, please contact the clinic through [employer] or phone. If you have a critical or abnormal lab result, we will notify you by phone as soon as possible.  Submit refill requests through [employer] or call your pharmacy and they will forward the refill request to us. Please allow 3 business days for your refill to be completed.          Additional Information About Your Visit        [employer] Information     [employer] gives you secure access to your electronic health record. If you see a primary care provider, you can also send messages to your care team and make appointments. If you have questions, please call your primary care clinic.  If you do not have a primary care provider, please call [phone] and they will assist you.        Care EveryWhere ID     This is your Care EveryWhere ID. This could be used by other organizations to access your [place] medical records  [id number]        Your Vitals Were     Pulse Temperature Height Pulse Oximetry BMI (Body Mass Index)       74 96.9  F (36.1  C) (Oral) 5' 4.75\" (1.645 m) 96% 25.66 kg/m2        Blood Pressure from Last 3 Encounters:   11/14/17 116/73   09/27/17 128/71   07/19/16 125/79    Weight from Last 3 Encounters:   11/14/17 153 lb (69.4 kg)   09/27/17 156 lb 1.6 oz (70.8 kg)   07/19/16 157 lb (71.2 kg)              We Performed the Following     Comprehensive metabolic panel (BMP + Alb, Alk Phos, ALT, AST, Total. Bili, TP)     DERMATOLOGY REFERRAL     Lipid panel reflex to direct LDL Fasting     PAF COMPLETED     TDAP VACCINE (ADACEL)     Vitamin D Deficiency          Today's Medication Changes          These changes are accurate as of: 11/14/17  8:03 AM.  If you have any questions, ask your nurse or doctor.               These medicines have changed or have updated prescriptions.        Dose/Directions    alendronate 35 MG tablet   Commonly known as:  FOSAMAX   This may have changed:  See the new " instructions.   Used for:  Osteopenia, unspecified location   Changed by:  Roberta Delong MD        TAKE 1 TABLET BY MOUTH EACH WEEK.TAKE WITH [id number] BEFORE FOOD,DRINK& MEDS. STAY UPRIGHT 30MIN   Quantity:  12 tablet   Refills:  3            Where to get your medicines      These medications were sent to [id number] - [facility] [address] [facility]  [address] [facility] [address]     Phone:  [phone]     alendronate 35 MG tablet                Primary Care Provider Office Phone # Fax #    Roberta Delong [phone] [address] [address]        Equal Access to Services     [facility]: Hadii aad ku hadasho Soomaali, waaxda luqadaha, qaybta kaalmada adeegyada, waxay jazlynin haymatty cohen . So [facility] [phone].    ATENCIÓN: Si habla español, tiene a irving disposición servicios gratuitos de asistencia lingüística. [facility] [phone].    We comply with applicable federal civil rights laws and [place] laws. We do not discriminate on the basis of race, color, national origin, age, disability, sex, sexual orientation, or gender identity.            Thank you!     Thank you for choosing [facility]  for your care. Our goal is always to provide you with excellent care. Hearing back from our patients is one way we can continue to improve our services. Please take a few minutes to complete the written survey that you may receive in the mail after your visit with us. Thank you!             Your Updated Medication List - Protect others around you: Learn how to safely use, store and throw away your medicines at www.disposemymeds.org.          This list is accurate as of: 11/14/17  8:03 AM.  Always use your most recent med list.                   Brand Name Dispense Instructions for use Diagnosis    alendronate 35 MG tablet    FOSAMAX    12 tablet    TAKE 1 TABLET BY MOUTH EACH WEEK.TAKE WITH [id number]  BEFORE FOOD,DRINK& MEDS. STAY UPRIGHT 30MIN    Osteopenia, unspecified location       atorvastatin 20 MG tablet    LIPITOR    30 tablet    TAKE 1 TABLET BY MOUTH EVERY DAY    Hyperlipidemia with target LDL less than 130       fluocinonide 0.05 % ointment    LIDEX    30 g    Apply sparingly to affected area twice daily for 14 days.  Do not apply to face.    Chigger bite       PARoxetine 20 MG tablet    PAXIL    45 tablet    Take 0.5 tablets (10 mg) by mouth every morning    Major depressive disorder, recurrent, moderate (H)

## 2017-11-14 NOTE — NURSING NOTE
"Chief Complaint   Patient presents with     Medicare Visit       Initial /73  Pulse 74  Temp 96.9  F (36.1  C) (Oral)  Ht 5' 4.75\" (1.645 m)  Wt 153 lb (69.4 kg)  SpO2 96%  BMI 25.66 kg/m2 Estimated body mass index is 25.66 kg/(m^2) as calculated from the following:    Height as of this encounter: 5' 4.75\" (1.645 m).    Weight as of this encounter: 153 lb (69.4 kg).  Medication Reconciliation: complete      Health Maintenance that is potentially due pending provider review:  NONE    n/a    BURAK Armando  "
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
## 2017-11-15 ASSESSMENT — ANXIETY QUESTIONNAIRES: GAD7 TOTAL SCORE: 4

## 2017-11-17 DIAGNOSIS — E78.5 HYPERLIPIDEMIA WITH TARGET LDL LESS THAN 130: ICD-10-CM

## 2017-11-17 RX ORDER — ATORVASTATIN CALCIUM 20 MG/1
TABLET, FILM COATED ORAL
Qty: 90 TABLET | Refills: 3 | Status: SHIPPED | OUTPATIENT
Start: 2017-11-17 | End: 2018-11-16

## 2017-11-17 NOTE — TELEPHONE ENCOUNTER
Prescription approved per [facility] Refill Protocol.  Margaret LEE RN    Requested Prescriptions   Pending Prescriptions Disp Refills     atorvastatin (LIPITOR) 20 MG tablet [Pharmacy Med Name: ATORVASTATIN 20 MG TABLET] 30 tablet 0     Sig: TAKE 1 TABLET BY MOUTH EVERY DAY    Statins Protocol Passed    11/17/2017 10:06 AM       Passed - LDL on file in past 12 months    Recent Labs   Lab Test  11/14/17   0823   LDL  95            Passed - No abnormal creatine kinase in past 12 months    No lab results found.         Passed - Recent or future visit with authorizing provider    Patient had office visit in the last year or has a visit in the next 30 days with authorizing provider.  See chart review.              Passed - Patient is age 18 or older       Passed - No active pregnancy on record       Passed - No positive pregnancy test in past 12 months

## 2017-11-17 NOTE — PROGRESS NOTES
Here are your lab results from your recent visit...  -Your CMP (which includes electrolyte levels, blood sugar levels, and kidney and liver function tests) looks completely normal.  -Your cholesterol panel looks very good with a low LDL (the bad cholesterol) and a good HDL (the good cholesterol).   -Your Vitamin D levels are on the lower end of normal, so I'd start the Vitamin D3 at 2000 units/day now, and continue until summer.    Please let me know if you have any questions.  Best,   Branden Delong MD

## 2017-11-22 DIAGNOSIS — E78.5 HYPERLIPIDEMIA WITH TARGET LDL LESS THAN 130: ICD-10-CM

## 2017-11-24 RX ORDER — ATORVASTATIN CALCIUM 20 MG/1
TABLET, FILM COATED ORAL
Start: 2017-11-24

## 2017-11-24 NOTE — TELEPHONE ENCOUNTER
Refill request for Atorvastatin.  Duplicate.  Rx sent 11/17/17 for #90 with 3 refills.  Dianna Lunsford RN

## 2017-11-30 PROBLEM — M54.2 CERVICALGIA: Status: RESOLVED | Noted: 2017-09-27 | Resolved: 2017-11-30

## 2017-11-30 PROBLEM — M25.512 ACUTE PAIN OF LEFT SHOULDER: Status: RESOLVED | Noted: 2017-09-27 | Resolved: 2017-11-30

## 2018-09-14 ENCOUNTER — OFFICE VISIT (OUTPATIENT)
Dept: FAMILY MEDICINE | Facility: CLINIC | Age: 78
End: 2018-09-14
Payer: COMMERCIAL

## 2018-09-14 VITALS
WEIGHT: 152.8 LBS | HEART RATE: 75 BPM | DIASTOLIC BLOOD PRESSURE: 68 MMHG | OXYGEN SATURATION: 97 % | BODY MASS INDEX: 25.46 KG/M2 | TEMPERATURE: 97.8 F | HEIGHT: 65 IN | SYSTOLIC BLOOD PRESSURE: 122 MMHG

## 2018-09-14 DIAGNOSIS — F33.1 MAJOR DEPRESSIVE DISORDER, RECURRENT, MODERATE (H): ICD-10-CM

## 2018-09-14 DIAGNOSIS — F41.1 ANXIETY STATE: Primary | ICD-10-CM

## 2018-09-14 PROCEDURE — 99214 OFFICE O/P EST MOD 30 MIN: CPT | Performed by: FAMILY MEDICINE

## 2018-09-14 RX ORDER — ESCITALOPRAM OXALATE 5 MG/1
5 TABLET ORAL DAILY
Qty: 30 TABLET | Refills: 2 | Status: SHIPPED | OUTPATIENT
Start: 2018-09-14 | End: 2018-11-16

## 2018-09-14 ASSESSMENT — ANXIETY QUESTIONNAIRES
3. WORRYING TOO MUCH ABOUT DIFFERENT THINGS: SEVERAL DAYS
6. BECOMING EASILY ANNOYED OR IRRITABLE: SEVERAL DAYS
IF YOU CHECKED OFF ANY PROBLEMS ON THIS QUESTIONNAIRE, HOW DIFFICULT HAVE THESE PROBLEMS MADE IT FOR YOU TO DO YOUR WORK, TAKE CARE OF THINGS AT HOME, OR GET ALONG WITH OTHER PEOPLE: SOMEWHAT DIFFICULT
GAD7 TOTAL SCORE: 7
1. FEELING NERVOUS, ANXIOUS, OR ON EDGE: SEVERAL DAYS
5. BEING SO RESTLESS THAT IT IS HARD TO SIT STILL: SEVERAL DAYS
7. FEELING AFRAID AS IF SOMETHING AWFUL MIGHT HAPPEN: SEVERAL DAYS
2. NOT BEING ABLE TO STOP OR CONTROL WORRYING: SEVERAL DAYS

## 2018-09-14 ASSESSMENT — PATIENT HEALTH QUESTIONNAIRE - PHQ9: 5. POOR APPETITE OR OVEREATING: SEVERAL DAYS

## 2018-09-14 NOTE — MR AVS SNAPSHOT
After Visit Summary   9/14/2018    Lawrence Pemberton    MRN: [id number]           Patient Information     Date Of Birth          1940        Visit Information        Provider Department      9/14/2018 10:30 AM Roberta Delong MD [facility]        Today's Diagnoses     Anxiety state    -  1    Major depressive disorder, recurrent, moderate (H)           Follow-ups after your visit        Your next 10 appointments already scheduled     Nov 16, 2018  9:00 AM CST   PHYSICAL with Roberta Delong MD   [facility] ([facility])    [address] [facility] [address] [phone]              Who to contact     If you have questions or need follow up information about today's clinic visit or your schedule please contact [facility] directly at [phone].  Normal or non-critical lab and imaging results will be communicated to you by MyChart, letter or phone within 4 business days after the clinic has received the results. If you do not hear from us within 7 days, please contact the clinic through MyChart or phone. If you have a critical or abnormal lab result, we will notify you by phone as soon as possible.  Submit refill requests through [email or web address] or call your pharmacy and they will forward the refill request to us. Please allow 3 business days for your refill to be completed.          Additional Information About Your Visit        MyChart Information     [email or web address] gives you secure access to your electronic health record. If you see a primary care provider, you can also send messages to your care team and make appointments. If you have questions, please call your primary care clinic.  If you do not have a primary care provider, please call [phone] and they will assist you.        Care EveryWhere ID     This is your Care EveryWhere ID. This could be used by other organizations to access your [facility]  "records  [id number]        Your Vitals Were     Pulse Temperature Height Pulse Oximetry Breastfeeding? BMI (Body Mass Index)    75 97.8  F (36.6  C) (Oral) 5' 4.75\" (1.645 m) 97% No 25.62 kg/m2       Blood Pressure from Last 3 Encounters:   09/14/18 122/68   11/14/17 116/73   09/27/17 128/71    Weight from Last 3 Encounters:   09/14/18 152 lb 12.8 oz (69.3 kg)   11/14/17 153 lb (69.4 kg)   09/27/17 156 lb 1.6 oz (70.8 kg)              Today, you had the following     No orders found for display         Today's Medication Changes          These changes are accurate as of 9/14/18 11:00 AM.  If you have any questions, ask your nurse or doctor.               Start taking these medicines.        Dose/Directions    escitalopram 5 MG tablet   Commonly known as:  LEXAPRO   Used for:  Anxiety state, Major depressive disorder, recurrent, moderate (H)   Started by:  Roberta Delong MD        Dose:  5 mg   Take 1 tablet (5 mg) by mouth daily   Quantity:  30 tablet   Refills:  2            Where to get your medicines      These medications were sent to [facility] # [id number] - [place] [address]     Phone:  [phone]     escitalopram 5 MG tablet                Primary Care Provider Office Phone # Fax #    Roberta Delong [phone] [address] [address]        Equal Access to Services     [facility]: Hadii hermann bermudez hadasho Sohoracio, waaxda luqadaha, qaybta kaalmada beni, waxay idiin hayaan adeeg kharash la'aan . So [facility] [phone].    ATENCIÓN: Si habla español, tiene a irving disposición servicios gratuitos de asistencia lingüística. Llame al [phone].    We comply with applicable federal civil rights laws and [place] laws. We do not discriminate on the basis of race, color, national origin, age, disability, sex, sexual orientation, or gender identity.            Thank you!     Thank you for " choosing [facility]  for your care. Our goal is always to provide you with excellent care. Hearing back from our patients is one way we can continue to improve our services. Please take a few minutes to complete the written survey that you may receive in the mail after your visit with us. Thank you!             Your Updated Medication List - Protect others around you: Learn how to safely use, store and throw away your medicines at www.disposemymeds.org.          This list is accurate as of 9/14/18 11:00 AM.  Always use your most recent med list.                   Brand Name Dispense Instructions for use Diagnosis    alendronate 35 MG tablet    FOSAMAX    12 tablet    TAKE 1 TABLET BY MOUTH EACH WEEK.TAKE WITH [id number] BEFORE FOOD,DRINK& MEDS. STAY UPRIGHT 30MIN    Osteopenia, unspecified location       atorvastatin 20 MG tablet    LIPITOR    90 tablet    TAKE 1 TABLET BY MOUTH EVERY DAY    Hyperlipidemia with target LDL less than 130       escitalopram 5 MG tablet    LEXAPRO    30 tablet    Take 1 tablet (5 mg) by mouth daily    Anxiety state, Major depressive disorder, recurrent, moderate (H)       PARoxetine 20 MG tablet    PAXIL    45 tablet    Take 0.5 tablets (10 mg) by mouth every morning    Major depressive disorder, recurrent, moderate (H)       VOLTAREN 1 % Gel topical gel   Generic drug:  diclofenac     100 g    Apply 4 grams to knees or 2 grams to hands four times daily using enclosed dosing card.    Facet arthropathy of spine
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
## 2018-09-15 ASSESSMENT — ANXIETY QUESTIONNAIRES: GAD7 TOTAL SCORE: 7

## 2018-09-15 ASSESSMENT — PATIENT HEALTH QUESTIONNAIRE - PHQ9: SUM OF ALL RESPONSES TO PHQ QUESTIONS 1-9: 5

## 2018-11-16 ENCOUNTER — OFFICE VISIT (OUTPATIENT)
Dept: FAMILY MEDICINE | Facility: CLINIC | Age: 78
End: 2018-11-16
Payer: COMMERCIAL

## 2018-11-16 VITALS
RESPIRATION RATE: 14 BRPM | SYSTOLIC BLOOD PRESSURE: 135 MMHG | TEMPERATURE: 98.6 F | BODY MASS INDEX: 25.99 KG/M2 | DIASTOLIC BLOOD PRESSURE: 75 MMHG | OXYGEN SATURATION: 94 % | HEART RATE: 75 BPM | HEIGHT: 65 IN | WEIGHT: 156 LBS

## 2018-11-16 DIAGNOSIS — M19.90 OSTEOARTHRITIS, UNSPECIFIED OSTEOARTHRITIS TYPE, UNSPECIFIED SITE: ICD-10-CM

## 2018-11-16 DIAGNOSIS — H61.22 IMPACTED CERUMEN OF LEFT EAR: ICD-10-CM

## 2018-11-16 DIAGNOSIS — F33.1 MAJOR DEPRESSIVE DISORDER, RECURRENT, MODERATE (H): ICD-10-CM

## 2018-11-16 DIAGNOSIS — M85.80 OSTEOPENIA, UNSPECIFIED LOCATION: ICD-10-CM

## 2018-11-16 DIAGNOSIS — Z00.00 ENCOUNTER FOR ROUTINE ADULT HEALTH EXAMINATION WITHOUT ABNORMAL FINDINGS: Primary | ICD-10-CM

## 2018-11-16 DIAGNOSIS — E78.5 HYPERLIPIDEMIA WITH TARGET LDL LESS THAN 130: ICD-10-CM

## 2018-11-16 DIAGNOSIS — Z23 NEED FOR PROPHYLACTIC VACCINATION AND INOCULATION AGAINST INFLUENZA: ICD-10-CM

## 2018-11-16 DIAGNOSIS — F41.1 ANXIETY STATE: ICD-10-CM

## 2018-11-16 LAB
ANION GAP SERPL CALCULATED.3IONS-SCNC: 6 MMOL/L (ref 3–14)
BUN SERPL-MCNC: 18 MG/DL (ref 7–30)
CALCIUM SERPL-MCNC: 9 MG/DL (ref 8.5–10.1)
CHLORIDE SERPL-SCNC: 104 MMOL/L (ref 94–109)
CHOLEST SERPL-MCNC: 162 MG/DL
CO2 SERPL-SCNC: 28 MMOL/L (ref 20–32)
CREAT SERPL-MCNC: 0.8 MG/DL (ref 0.52–1.04)
GFR SERPL CREATININE-BSD FRML MDRD: 69 ML/MIN/1.7M2
GLUCOSE SERPL-MCNC: 84 MG/DL (ref 70–99)
HDLC SERPL-MCNC: 44 MG/DL
LDLC SERPL CALC-MCNC: 97 MG/DL
NONHDLC SERPL-MCNC: 118 MG/DL
POTASSIUM SERPL-SCNC: 4.2 MMOL/L (ref 3.4–5.3)
SODIUM SERPL-SCNC: 138 MMOL/L (ref 133–144)
TRIGL SERPL-MCNC: 103 MG/DL

## 2018-11-16 PROCEDURE — G0439 PPPS, SUBSEQ VISIT: HCPCS | Performed by: FAMILY MEDICINE

## 2018-11-16 PROCEDURE — 69209 REMOVE IMPACTED EAR WAX UNI: CPT | Mod: LT | Performed by: FAMILY MEDICINE

## 2018-11-16 PROCEDURE — 90662 IIV NO PRSV INCREASED AG IM: CPT | Performed by: FAMILY MEDICINE

## 2018-11-16 PROCEDURE — G0008 ADMIN INFLUENZA VIRUS VAC: HCPCS | Performed by: FAMILY MEDICINE

## 2018-11-16 PROCEDURE — 80061 LIPID PANEL: CPT | Performed by: FAMILY MEDICINE

## 2018-11-16 PROCEDURE — 99207 ZZC FOR CODING REVIEW: CPT | Performed by: FAMILY MEDICINE

## 2018-11-16 PROCEDURE — 99213 OFFICE O/P EST LOW 20 MIN: CPT | Mod: 25 | Performed by: FAMILY MEDICINE

## 2018-11-16 PROCEDURE — 80048 BASIC METABOLIC PNL TOTAL CA: CPT | Performed by: FAMILY MEDICINE

## 2018-11-16 PROCEDURE — 36415 COLL VENOUS BLD VENIPUNCTURE: CPT | Performed by: FAMILY MEDICINE

## 2018-11-16 RX ORDER — ALENDRONATE SODIUM 35 MG/1
TABLET ORAL
Qty: 12 TABLET | Refills: 1 | Status: SHIPPED | OUTPATIENT
Start: 2018-11-16 | End: 2019-06-03

## 2018-11-16 RX ORDER — ESCITALOPRAM OXALATE 5 MG/1
5 TABLET ORAL DAILY
Qty: 90 TABLET | Refills: 1 | Status: SHIPPED | OUTPATIENT
Start: 2018-11-16 | End: 2019-06-03

## 2018-11-16 RX ORDER — ATORVASTATIN CALCIUM 20 MG/1
20 TABLET, FILM COATED ORAL DAILY
Qty: 90 TABLET | Refills: 3 | Status: SHIPPED | OUTPATIENT
Start: 2018-11-16 | End: 2019-11-19

## 2018-11-16 ASSESSMENT — ANXIETY QUESTIONNAIRES
3. WORRYING TOO MUCH ABOUT DIFFERENT THINGS: SEVERAL DAYS
GAD7 TOTAL SCORE: 5
5. BEING SO RESTLESS THAT IT IS HARD TO SIT STILL: NOT AT ALL
2. NOT BEING ABLE TO STOP OR CONTROL WORRYING: SEVERAL DAYS
1. FEELING NERVOUS, ANXIOUS, OR ON EDGE: SEVERAL DAYS
IF YOU CHECKED OFF ANY PROBLEMS ON THIS QUESTIONNAIRE, HOW DIFFICULT HAVE THESE PROBLEMS MADE IT FOR YOU TO DO YOUR WORK, TAKE CARE OF THINGS AT HOME, OR GET ALONG WITH OTHER PEOPLE: NOT DIFFICULT AT ALL
6. BECOMING EASILY ANNOYED OR IRRITABLE: SEVERAL DAYS
7. FEELING AFRAID AS IF SOMETHING AWFUL MIGHT HAPPEN: SEVERAL DAYS

## 2018-11-16 ASSESSMENT — ACTIVITIES OF DAILY LIVING (ADL): CURRENT_FUNCTION: NO ASSISTANCE NEEDED

## 2018-11-16 ASSESSMENT — PATIENT HEALTH QUESTIONNAIRE - PHQ9
SUM OF ALL RESPONSES TO PHQ QUESTIONS 1-9: 3
5. POOR APPETITE OR OVEREATING: NOT AT ALL

## 2018-11-16 NOTE — NURSING NOTE
Chief Complaint   Patient presents with     Wellness Visit     left ear lavage as instructed by Dr. Delong. Ear flushed with warm water and H2O2. Ear canal cleared of cerumen and tympanic membrane visualized.JIMI Li, CMA

## 2018-11-16 NOTE — MR AVS SNAPSHOT
After Visit Summary   11/16/2018    Lawrence Pemberton    MRN: [id number]           Patient Information     Date Of Birth          1940        Visit Information        Provider Department      11/16/2018 9:00 AM Roberta Delong MD [facility]        Today's Diagnoses     Encounter for routine adult health examination without abnormal findings    -  1    Major depressive disorder, recurrent, moderate (H)        Anxiety state        Need for prophylactic vaccination and inoculation against influenza        Hyperlipidemia with target LDL less than 130        Osteoarthritis, unspecified osteoarthritis type, unspecified site        Osteopenia, unspecified location          Care Instructions    For your chronic joint pains, could try...  --Tumeric/curcurmin supplement- try for 2-4 weeks and see if it helps.  If it doesn't help, or give you stomach symptoms, stop.  --Glucosamine/Chondroitin supplement- try for 3-4 months and see if it helps, if not, stop.        Preventive Health Recommendations    See your health care provider every year to    Review health changes.     Discuss preventive care.      Review your medicines if your doctor has prescribed any.      You no longer need a yearly Pap test unless you've had an abnormal Pap test in the past 10 years. If you have vaginal symptoms, such as bleeding or discharge, be sure to talk with your provider about a Pap test.      Every 1 to 2 years, have a mammogram.  If you are over 69, talk with your health care provider about whether or not you want to continue having screening mammograms.      Every 10 years, have a colonoscopy. Or, have a yearly FIT test (stool test). These exams will check for colon cancer.       Have a cholesterol test every 5 years, or more often if your doctor advises it.       Have a diabetes test (fasting glucose) every three years. If you are at risk for diabetes, you should have this test more often.       At age  65, have a bone density scan (DEXA) to check for osteoporosis (brittle bone disease).    Shots:    Get a flu shot each year.    Get a tetanus shot every 10 years.    Talk to your doctor about your pneumonia vaccines. There are now two you should receive - Pneumovax (PPSV 23) and Prevnar (PCV 13).    Talk to your pharmacist about the shingles vaccine.    Talk to your doctor about the hepatitis B vaccine.    Nutrition:     Eat at least 5 servings of fruits and vegetables each day.      Eat whole-grain bread, whole-wheat pasta and brown rice instead of white grains and rice.      Get adequate Calcium and Vitamin D.     Lifestyle    Exercise at least 150 minutes a week (30 minutes a day, 5 days a week). This will help you control your weight and prevent disease.      Limit alcohol to one drink per day.      No smoking.       Wear sunscreen to prevent skin cancer.       See your dentist twice a year for an exam and cleaning.      See your eye doctor every 1 to 2 years to screen for conditions such as glaucoma, macular degeneration and cataracts.    Personalized Prevention Plan  You are due for the preventive services outlined below.  Your care team is available to assist you in scheduling these services.  If you have already completed any of these items, please share that information with your care team to update in your medical record.  Health Maintenance Due   Topic Date Due     Depression Action Plan Review - yearly  07/19/2017     Discuss Advance Directive Planning  05/14/2018     Flu Vaccine (1) 09/01/2018     FALL RISK ASSESSMENT  11/14/2018             Follow-ups after your visit        Follow-up notes from your care team     Return in about 6 months (around 5/16/2019) for Depression follow-up, osteoporosis f/u, order dexa.      Who to contact     If you have questions or need follow up information about today's clinic visit or your schedule please contact [facility] directly at [phone].  Normal  "or non-critical lab and imaging results will be communicated to you by MyChart, letter or phone within 4 business days after the clinic has received the results. If you do not hear from us within 7 days, please contact the clinic through [employer] or phone. If you have a critical or abnormal lab result, we will notify you by phone as soon as possible.  Submit refill requests through [employer] or call your pharmacy and they will forward the refill request to us. Please allow 3 business days for your refill to be completed.          Additional Information About Your Visit        [employer] Information     [employer] gives you secure access to your electronic health record. If you see a primary care provider, you can also send messages to your care team and make appointments. If you have questions, please call your primary care clinic.  If you do not have a primary care provider, please call [phone] and they will assist you.        Care EveryWhere ID     This is your Care EveryWhere ID. This could be used by other organizations to access your [facility] medical records  [id number]        Your Vitals Were     Pulse Temperature Respirations Height Pulse Oximetry Breastfeeding?    75 98.6  F (37  C) (Oral) 14 5' 4.75\" (1.645 m) 94% No    BMI (Body Mass Index)                   26.16 kg/m2            Blood Pressure from Last 3 Encounters:   11/16/18 135/75   09/14/18 122/68   11/14/17 116/73    Weight from Last 3 Encounters:   11/16/18 156 lb (70.8 kg)   09/14/18 152 lb 12.8 oz (69.3 kg)   11/14/17 153 lb (69.4 kg)              We Performed the Following     ADMIN INFLUENZA (For MEDICARE Patients ONLY) []     Basic metabolic panel  (Ca, Cl, CO2, Creat, Gluc, K, Na, BUN)     FLU VACCINE, INCREASED ANTIGEN, PRESV FREE, AGE 65+ [02545]     Lipid panel reflex to direct LDL Fasting     PAF COMPLETED          Today's Medication Changes          These changes are accurate as of 11/16/18  9:38 AM.  If you have any questions, ask " your nurse or doctor.               These medicines have changed or have updated prescriptions.        Dose/Directions    atorvastatin 20 MG tablet   Commonly known as:  LIPITOR   This may have changed:  See the new instructions.   Used for:  Hyperlipidemia with target LDL less than 130   Changed by:  Roberta Delong MD        Dose:  20 mg   Take 1 tablet (20 mg) by mouth daily   Quantity:  90 tablet   Refills:  3            Where to get your medicines      These medications were sent to [id number] - [facility] [address] [place] [address]  [address] [facility] [address]     Phone:  [phone]     alendronate 35 MG tablet    atorvastatin 20 MG tablet    escitalopram 5 MG tablet                Primary Care Provider Office Phone # Fax #    Roberta Delong [phone] [address] [address]        Equal Access to Services     [facility]: Hadii hermann ku hadasho Soomaali, waaxda luqadaha, qaybta kaalmada adeegyada, leah hobsonin haymatty cohen . So [facility] [phone].    ATENCIÓN: Si habla español, tiene a irving disposición servicios gratuitos de asistencia lingüística. [facility] [phone].    We comply with applicable federal civil rights laws and [place] laws. We do not discriminate on the basis of race, color, national origin, age, disability, sex, sexual orientation, or gender identity.            Thank you!     Thank you for choosing [facility]  for your care. Our goal is always to provide you with excellent care. Hearing back from our patients is one way we can continue to improve our services. Please take a few minutes to complete the written survey that you may receive in the mail after your visit with us. Thank you!             Your Updated Medication List - Protect others around you: Learn how to safely use, store and throw away your medicines at www.disposemymeds.org.          This list is accurate as of  11/16/18  9:38 AM.  Always use your most recent med list.                   Brand Name Dispense Instructions for use Diagnosis    alendronate 35 MG tablet    FOSAMAX    12 tablet    TAKE 1 TABLET BY MOUTH EACH WEEK.TAKE WITH [id number] BEFORE FOOD,DRINK& MEDS. STAY UPRIGHT 30MIN    Osteopenia, unspecified location       atorvastatin 20 MG tablet    LIPITOR    90 tablet    Take 1 tablet (20 mg) by mouth daily    Hyperlipidemia with target LDL less than 130       escitalopram 5 MG tablet    LEXAPRO    90 tablet    Take 1 tablet (5 mg) by mouth daily    Anxiety state, Major depressive disorder, recurrent, moderate (H)       VOLTAREN 1 % Gel topical gel   Generic drug:  diclofenac     100 g    Apply 4 grams to knees or 2 grams to hands four times daily using enclosed dosing card.    Facet arthropathy of spine
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
## 2018-11-16 NOTE — PATIENT INSTRUCTIONS
For your chronic joint pains, could try...  --Tumeric/curcurmin supplement- try for 2-4 weeks and see if it helps.  If it doesn't help, or give you stomach symptoms, stop.  --Glucosamine/Chondroitin supplement- try for 3-4 months and see if it helps, if not, stop.        Preventive Health Recommendations    See your health care provider every year to    Review health changes.     Discuss preventive care.      Review your medicines if your doctor has prescribed any.      You no longer need a yearly Pap test unless you've had an abnormal Pap test in the past 10 years. If you have vaginal symptoms, such as bleeding or discharge, be sure to talk with your provider about a Pap test.      Every 1 to 2 years, have a mammogram.  If you are over 69, talk with your health care provider about whether or not you want to continue having screening mammograms.      Every 10 years, have a colonoscopy. Or, have a yearly FIT test (stool test). These exams will check for colon cancer.       Have a cholesterol test every 5 years, or more often if your doctor advises it.       Have a diabetes test (fasting glucose) every three years. If you are at risk for diabetes, you should have this test more often.       At age 65, have a bone density scan (DEXA) to check for osteoporosis (brittle bone disease).    Shots:    Get a flu shot each year.    Get a tetanus shot every 10 years.    Talk to your doctor about your pneumonia vaccines. There are now two you should receive - Pneumovax (PPSV 23) and Prevnar (PCV 13).    Talk to your pharmacist about the shingles vaccine.    Talk to your doctor about the hepatitis B vaccine.    Nutrition:     Eat at least 5 servings of fruits and vegetables each day.      Eat whole-grain bread, whole-wheat pasta and brown rice instead of white grains and rice.      Get adequate Calcium and Vitamin D.     Lifestyle    Exercise at least 150 minutes a week (30 minutes a day, 5 days a week). This will help you  control your weight and prevent disease.      Limit alcohol to one drink per day.      No smoking.       Wear sunscreen to prevent skin cancer.       See your dentist twice a year for an exam and cleaning.      See your eye doctor every 1 to 2 years to screen for conditions such as glaucoma, macular degeneration and cataracts.    Personalized Prevention Plan  You are due for the preventive services outlined below.  Your care team is available to assist you in scheduling these services.  If you have already completed any of these items, please share that information with your care team to update in your medical record.  Health Maintenance Due   Topic Date Due     Depression Action Plan Review - yearly  07/19/2017     Discuss Advance Directive Planning  05/14/2018     Flu Vaccine (1) 09/01/2018     FALL RISK ASSESSMENT  11/14/2018

## 2018-11-16 NOTE — NURSING NOTE
"Chief Complaint   Patient presents with     Wellness Visit       Initial /75  Pulse 75  Temp 98.6  F (37  C) (Oral)  Resp 14  Ht 5' 4.75\" (1.645 m)  Wt 156 lb (70.8 kg)  SpO2 94%  Breastfeeding? No  BMI 26.16 kg/m2 Estimated body mass index is 26.16 kg/(m^2) as calculated from the following:    Height as of this encounter: 5' 4.75\" (1.645 m).    Weight as of this encounter: 156 lb (70.8 kg).  BP completed using cuff size: regular    Health Maintenance that is potentially due pending provider review:  Health Maintenance Due   Topic Date Due     DEPRESSION ACTION PLAN Q1 YR  07/19/2017     ADVANCE DIRECTIVE PLANNING Q5 YRS  05/14/2018     INFLUENZA VACCINE (1) 09/01/2018     FALL RISK ASSESSMENT  11/14/2018         Fall risk done  Flu vaccine today    Injectable Influenza Immunization Documentation    1.  Is the person to be vaccinated sick today?   No    2. Does the person to be vaccinated have an allergy to a component   of the vaccine?   No  Egg Allergy Algorithm Link    3. Has the person to be vaccinated ever had a serious reaction   to influenza vaccine in the past?   No    4. Has the person to be vaccinated ever had Guillain-Barré syndrome?   No    Form completed by patient and JIMI Li CMA             "

## 2018-11-17 ASSESSMENT — ANXIETY QUESTIONNAIRES: GAD7 TOTAL SCORE: 5

## 2018-11-19 NOTE — PROGRESS NOTES
Here are your lab results from your recent visit...  -Your basic metabolic panel (which includes electrolyte levels, blood sugar level and kidney function tests) looks great.  -Your cholesterol panel looks very good, with a low LDL (the bad cholesterol) and a pretty good HDL (the good cholesterol).    Please let me know if you have any questions.  Best,   Branden Delong MD

## 2019-06-03 DIAGNOSIS — F33.1 MAJOR DEPRESSIVE DISORDER, RECURRENT, MODERATE (H): ICD-10-CM

## 2019-06-03 DIAGNOSIS — F41.1 ANXIETY STATE: ICD-10-CM

## 2019-06-03 DIAGNOSIS — M85.80 OSTEOPENIA, UNSPECIFIED LOCATION: ICD-10-CM

## 2019-06-03 NOTE — TELEPHONE ENCOUNTER
"Requested Prescriptions   Pending Prescriptions Disp Refills     alendronate (FOSAMAX) 35 MG tablet [Pharmacy Med Name: Alendronate Sodium Oral Tablet 35 MG] 12 tablet 0     Sig: TAKE ONE TABLET BY MOUTH WEEKLY. TAKE WITH WATER 30MIN BEFORE FOOD, DRINK AND MEDS. STAY UPRIGHT FOR 30 MINUTES       Bisphosphonates Failed - 6/3/2019 12:26 PM        Failed - Dexa on file within past 2 years     Please review last Dexa result.           Passed - Recent (12 mo) or future (30 days) visit within the authorizing provider's specialty     Patient had office visit in the last 12 months or has a visit in the next 30 days with authorizing provider or within the authorizing provider's specialty.  See \"Patient Info\" tab in inbasket, or \"Choose Columns\" in Meds & Orders section of the refill encounter.              Passed - Medication is active on med list        Passed - Patient is age 18 or older        Passed - Normal serum creatinine on file within past 12 months     Recent Labs   Lab Test 11/16/18  0955   CR 0.80             escitalopram (LEXAPRO) 5 MG tablet [Pharmacy Med Name: Escitalopram Oxalate Oral Tablet 5 MG] 90 tablet 0     Sig: TAKE ONE TABLET BY MOUTH ONE TIME DAILY       SSRIs Protocol Failed - 6/3/2019 12:26 PM        Failed - PHQ-9 score less than 5 in past 6 months     Please review last PHQ-9 score.           Failed - Recent (6 mo) or future (30 days) visit within the authorizing provider's specialty     Patient had office visit in the last 6 months or has a visit in the next 30 days with authorizing provider or within the authorizing provider's specialty.  See \"Patient Info\" tab in inbasket, or \"Choose Columns\" in Meds & Orders section of the refill encounter.            Passed - Medication is active on med list        Passed - Patient is age 18 or older        Passed - No active pregnancy on record        Passed - No positive pregnancy test in last 12 months        Last Written Prescription Date:  " 11/16/2018  Last Fill Quantity: 12 & 90,  # refills: 1   Last office visit: 11/16/2018 with prescribing provider:  Dr. Delong   Future Office Visit:      Diana Harris MA  Medical Assistant  [facility]

## 2019-06-04 RX ORDER — ESCITALOPRAM OXALATE 5 MG/1
TABLET ORAL
Qty: 30 TABLET | Refills: 0 | Status: SHIPPED | OUTPATIENT
Start: 2019-06-04 | End: 2019-06-18

## 2019-06-04 RX ORDER — ALENDRONATE SODIUM 35 MG/1
TABLET ORAL
Qty: 4 TABLET | Refills: 0 | Status: SHIPPED | OUTPATIENT
Start: 2019-06-04 | End: 2019-07-09

## 2019-06-04 NOTE — TELEPHONE ENCOUNTER
Medication is being filled for 1 time refill only due to:  Patient needs to be seen because due for osteoporosis follow up.

## 2019-06-17 ENCOUNTER — TELEPHONE (OUTPATIENT)
Dept: FAMILY MEDICINE | Facility: CLINIC | Age: 79
End: 2019-06-17

## 2019-06-17 NOTE — TELEPHONE ENCOUNTER
Left detailed message that CW is out of the office for a week but there are opening here currently for today.  Asked her to call back to schedule and if still questions, ask for a triage nurse.  Emily Conrad RN

## 2019-06-17 NOTE — TELEPHONE ENCOUNTER
Reason for Call:  Same Day Appointment, Requested Provider: Roberta Rodriguez     PCP: Roberta Delong    Reason for visit: Back Pain     Duration of symptoms: Ongoing problem     Have you been treated for this in the past? Yes    Additional comments: Pt wants to know if she can be worked in provider schedule. Please call to discuss.     Can we leave a detailed message on this number? YES    Phone number patient can be reached at: Cell number on file:    Telephone Information:   Mobile [phone]       Best Time: Anytime    Call taken on 6/17/2019 at 10:34 AM by Mehrdad Pierre

## 2019-06-18 ENCOUNTER — OFFICE VISIT (OUTPATIENT)
Dept: FAMILY MEDICINE | Facility: CLINIC | Age: 79
End: 2019-06-18
Payer: COMMERCIAL

## 2019-06-18 VITALS
SYSTOLIC BLOOD PRESSURE: 149 MMHG | WEIGHT: 156 LBS | RESPIRATION RATE: 18 BRPM | DIASTOLIC BLOOD PRESSURE: 79 MMHG | TEMPERATURE: 98.1 F | OXYGEN SATURATION: 97 % | HEIGHT: 64 IN | BODY MASS INDEX: 26.63 KG/M2 | HEART RATE: 70 BPM

## 2019-06-18 DIAGNOSIS — F41.1 ANXIETY STATE: ICD-10-CM

## 2019-06-18 DIAGNOSIS — M54.50 CHRONIC BILATERAL LOW BACK PAIN WITHOUT SCIATICA: Primary | ICD-10-CM

## 2019-06-18 DIAGNOSIS — F33.1 MAJOR DEPRESSIVE DISORDER, RECURRENT, MODERATE (H): ICD-10-CM

## 2019-06-18 DIAGNOSIS — G89.29 CHRONIC BILATERAL LOW BACK PAIN WITHOUT SCIATICA: Primary | ICD-10-CM

## 2019-06-18 PROCEDURE — 99214 OFFICE O/P EST MOD 30 MIN: CPT | Performed by: PHYSICIAN ASSISTANT

## 2019-06-18 RX ORDER — ESCITALOPRAM OXALATE 5 MG/1
5 TABLET ORAL DAILY
Qty: 90 TABLET | Refills: 1 | Status: SHIPPED | OUTPATIENT
Start: 2019-06-18 | End: 2019-11-19

## 2019-06-18 ASSESSMENT — PATIENT HEALTH QUESTIONNAIRE - PHQ9: SUM OF ALL RESPONSES TO PHQ QUESTIONS 1-9: 2

## 2019-06-18 ASSESSMENT — MIFFLIN-ST. JEOR: SCORE: 1167.61

## 2019-06-18 NOTE — NURSING NOTE
"Chief Complaint   Patient presents with     Back Pain     /79   Pulse 70   Temp 98.1  F (36.7  C) (Oral)   Resp 18   Ht 1.626 m (5' 4\")   Wt 70.8 kg (156 lb)   SpO2 97%   BMI 26.78 kg/m   Estimated body mass index is 26.78 kg/m  as calculated from the following:    Height as of this encounter: 1.626 m (5' 4\").    Weight as of this encounter: 70.8 kg (156 lb).  bp completed using cuff size: regular       Health Maintenance addressed:  BP was high, used pink card, recheck manually and PHQ9    Possibly completing today per provider review.    Lilliana Garcia MA     "

## 2019-06-18 NOTE — PROGRESS NOTES
Subjective     Lawrence Pemberton is a 78 year old female who presents to clinic today for the following health issues:    HPI   Back Pain       Duration: this episode started one week ago         Specific cause: none    Description:   Location of pain: low back bilateral  Character of pain: sharp, dull ache, stabbing, cramping, constant and intermittent  Pain radiation:none  New numbness or weakness in legs, not attributed to pain:  no     Intensity: moderate, severe    History:   Pain interferes with job: Not applicable  History of back problems: history or back pain pt states she has had procedures and MRI's done   Any previous MRI or X-rays: Yes- at [place].  Date 2014  Sees a specialist for back pain:  No  Therapies tried without relief: activity, rest and sitting    Alleviating factors:   Improved by:       Precipitating factors:  Worsened by: Lifting, Bending, Standing, Sitting, Lying Flat, Walking and Coughing          Accompanying Signs & Symptoms:  Risk of Fracture:  Osteoporosis and Age >64  Risk of Cauda Equina:  None  Risk of Infection:  None  Risk of Cancer:  None  Risk of Ankylosing Spondylitis:  Onset at age <35, male, AND morning back stiffness. no              79 y/o female with acute flare of chronic condition.  She has struggled with low back pain for many years, and did end up having steroid injections and surgery through specialty.  She has done well for the last year or so, but she has had increase pain over the last week or so.  No skyla injury, just more lifting and bending in garden.  She is not interested in further surgery at this time.  She does know PHYSICAL THERAPY helps.      Anxiety Follow-Up    How are you doing with your anxiety since your last visit? Improved     Are you having other symptoms that might be associated with anxiety? No    Have you had a significant life event? No     Are you feeling depressed? No    Do you have any concerns with your use of alcohol or other drugs?  "No    Social History     Tobacco Use     Smoking status: Never Smoker     Smokeless tobacco: Never Used   Substance Use Topics     Alcohol use: Yes     Alcohol/week: 0.0 oz     Comment: on occ, glass wine/wk     Drug use: No     SHARMAINE-7 SCORE 11/14/2017 9/14/2018 11/16/2018   Total Score 4 7 5     PHQ 9/14/2018 11/16/2018 6/18/2019   PHQ-9 Total Score 5 3 2   Q9: Thoughts of better off dead/self-harm past 2 weeks Not at all Not at all Not at all           BP Readings from Last 3 Encounters:   06/18/19 149/79   11/16/18 135/75   09/14/18 122/68    Wt Readings from Last 3 Encounters:   06/18/19 70.8 kg (156 lb)   11/16/18 70.8 kg (156 lb)   09/14/18 69.3 kg (152 lb 12.8 oz)                    Reviewed and updated as needed this visit by Provider         Review of Systems   ROS COMP: Constitutional, HEENT, cardiovascular, pulmonary, gi and gu systems are negative, except as otherwise noted.      Objective    /79   Pulse 70   Temp 98.1  F (36.7  C) (Oral)   Resp 18   Ht 1.626 m (5' 4\")   Wt 70.8 kg (156 lb)   SpO2 97%   BMI 26.78 kg/m    Body mass index is 26.78 kg/m .  Physical Exam   GENERAL: alert and no distress  EYES: Eyes grossly normal to inspection  RESP: lungs clear to auscultation - no rales, rhonchi or wheezes  CV: regular rate and rhythm, normal S1 S2, no S3 or S4, no murmur, click or rub, no peripheral edema and peripheral pulses strong  MS: non tender spinous processes.  Tender right para lumbar.  Negative straight leg raise.    Diagnostic Test Results:  Labs reviewed in Epic        Assessment & Plan     1. Chronic bilateral low back pain without sciatica  Will set her up with PHYSICAL THERAPY and have placed a medical spine referral if symptoms persist or worsen   - [facility] REFERRAL; Future  - ORTHO  REFERRAL    2. Anxiety state  Doing well, will refill.  - escitalopram (LEXAPRO) 5 MG tablet; Take 1 tablet (5 mg) by mouth daily  Dispense: 90 tablet; Refill: " "1    3. Major depressive disorder, recurrent, moderate (H)    - escitalopram (LEXAPRO) 5 MG tablet; Take 1 tablet (5 mg) by mouth daily  Dispense: 90 tablet; Refill: 1     BMI:   Estimated body mass index is 26.78 kg/m  as calculated from the following:    Height as of this encounter: 1.626 m (5' 4\").    Weight as of this encounter: 70.8 kg (156 lb).               No follow-ups on file.    Mele Cornejo PA-C  [facility]    "

## 2019-06-19 ENCOUNTER — THERAPY VISIT (OUTPATIENT)
Dept: PHYSICAL THERAPY | Facility: CLINIC | Age: 79
End: 2019-06-19
Payer: COMMERCIAL

## 2019-06-19 DIAGNOSIS — M54.50 ACUTE RIGHT-SIDED LOW BACK PAIN WITHOUT SCIATICA: Primary | ICD-10-CM

## 2019-06-19 PROCEDURE — 97161 PT EVAL LOW COMPLEX 20 MIN: CPT | Mod: GP | Performed by: PHYSICAL THERAPIST

## 2019-06-19 PROCEDURE — 97110 THERAPEUTIC EXERCISES: CPT | Mod: GP | Performed by: PHYSICAL THERAPIST

## 2019-06-19 NOTE — PROGRESS NOTES
[facility] Initial Evaluation  Subjective:           Surgeries include:  Orthopedic surgery (Lumbar).  Current medications: Statin.   Primary job tasks: Cleaning, cooking, gardening.           Occupation: Retired.                           Objective:  System    Physical Exam    General     ROS    Assessment/Plan:

## 2019-06-19 NOTE — LETTER
[facility]  [address]  [place] [id number] [phone]    2019    Re: Lawrence Pemberton   :   1940  MRN:  [id number]   REFERRING PHYSICIAN:   Mele Cornejo    [facility]    Date of Initial Evaluation:  19  Visits:  Rxs Used: 1  Reason for Referral:  Acute right-sided low back pain without sciatica    EVALUATION SUMMARY    [facility]  Subjective:  The history is provided by the patient. No  was used.   Type of problem:  Lumbar.  Condition occurred with:  Insidious onset. This is a recurrent condition   Problem details: One week ago on  had onset of sharp back pain after gardening.  Has a history of pain off and on for 25 years. Pain is intermittent. 10 years ago had lumbar diskectomy.  Has arthritis in her back.  Walking is the problem.  Can walk with poles.  Better with leaning on a cart.  Patient is retired.  Can bike.  Sitting irritates now which she has not had issues with in the past.  Has not tried a brace. Has been waking up due to the pain at night. Taking Tylenol for pain.  Going to water exercise class and this feels ok.  No leg pain.  Patient reports pain:  Lumbar spine right. Radiates to:  No radiation. Associated symptoms:  Loss of motion/stiffness and loss of strength. Symptoms are exacerbated by standing, walking and sitting Relieved by: certain position. Pertinent medical history, surgeries:  Lumbar, Medications:  Statin, Occupation:  Retired.                 Objective:  Standing Alignment:    Pelvic:  Iliac crest high L  Gait:  Hip shift to the left and trunk lean to the right with walking.  Gait Type:  Antalgic     Non-Weight Bearing:    Pelvis:  PSIS high L  Flexibility/Screens:   Upper Extremity:    Decreased left upper extremity flexibility at:  Latissimus  Decreased right upper  extremity flexibility present at:  Latissimus  Lower Extremity:  Decreased left lower extremity flexibility:Hip Flexors  Decreased right lower extremity flexibility:  Hip Flexors  Lumbar/SI Evaluation  ROM:    AROM Lumbar:       Re: Lawrence Pemberton   :   1940    Flexion:        Hands to lower leg with some sharp pain in the right low back upon return  Ext:                    Mild sharp pain with extension and standing    Side Bend:        Left:     Right:   Rotation:           Left:     Right:   Side Glide:        Left:     Right:      Lumbar Myotomes:  normal  Lumbar DTR's:  not assessed  Cord Signs:  not assessed  Lumbar Dermtomes:  normal  Neural Tension/Mobility:  Lumbar:  Normal    Lumbar Palpation:  Palpation (lumbar): Increased tone in the right lumbar paraspinals.  General   ROS  Assessment/Plan:    Patient is a 78 year old female with lumbar complaints.    Patient has the following significant findings with corresponding treatment plan.                Diagnosis 1: Low back pain Pain -  manual therapy, self management, education and home program  Decreased ROM/flexibility - manual therapy, therapeutic exercise and home program  Decreased joint mobility - manual therapy, therapeutic exercise and home program  Decreased strength - therapeutic exercise, therapeutic activities and home program  Decreased function - therapeutic activities and home program    Therapy Evaluation Codes:   1) History comprised of:   Personal factors that impact the plan of care:      None.    Comorbidity factors that impact the plan of care are:      Osteoarthritis.     Medications impacting care: None.  2) Examination of Body Systems comprised of:   Body structures and functions that impact the plan of care:      Lumbar spine.   Activity limitations that impact the plan of care are:      Lifting, Standing, Walking and Sleeping.  3) Clinical presentation characteristics are:   Stable/Uncomplicated.  4) Decision-Making    Low  complexity using standardized patient assessment instrument and/or measureable assessment of functional outcome.  Cumulative Therapy Evaluation is: Low complexity.    Communication ability:  Patient appears to be able to clearly communicate and understand verbal and written communication and follow directions correctly.  Treatment Explanation - The following has been discussed with the patient:   RX ordered/plan of care  Anticipated outcomes  Possible risks and side effects  This patient would benefit from PT intervention to resume normal activities.   Rehab potential is good.  Re: Lawrence Pemberton   :   1940    Frequency: Once X week, once daily  Duration:  for 4 weeks  Discharge Plan:  Independent in home treatment program.  Reach maximal therapeutic benefit.    Thank you for your referral.    INQUIRIES  Therapist: Ivone Roman PT, [facility]   [facility]AP[facility]  [address] [place] [address] [address]  [facility] [address]  Phone: [phone]  Fax: [phone]

## 2019-06-19 NOTE — PROGRESS NOTES
[facility] Initial Evaluation  Subjective:  The history is provided by the patient. No  was used.   Type of problem:  Lumbar   Condition occurred with:  Insidious onset. This is a recurrent condition   Problem details: One week ago on 6/12/219 had onset of sharp back pain after gardening.  Has a history of pain off and on for 25 years. Pain is intermittent. 10 years ago had lumbar diskectomy.  Has arthritis in her back.  Walking is the problem.  Can walk with poles.  Better with leaning on a cart.  Patient is retired.  Can bike.  Sitting irritates now which she has not had issues with in the past.  Has not tried a brace. Has been waking up due to the pain at night. Taking Tylenol for pain.  Going to water exercise class and this feels ok.  No leg pain.  .   Patient reports pain:  Lumbar spine right. Radiates to:  No radiation. Associated symptoms:  Loss of motion/stiffness and loss of strength. Symptoms are exacerbated by standing, walking and sitting Relieved by: certain position.                       Objective:  Standing Alignment:          Pelvic:  Iliac crest high L          Gait:  Hip shift to the left and trunk lean to the right with walking.  Gait Type:  Antalgic       Non-Weight Bearing:    Pelvis:  PSIS high L        Flexibility/Screens:     Upper Extremity:    Decreased left upper extremity flexibility at:  Latissimus    Decreased right upper extremity flexibility present at:  Latissimus  Lower Extremity:  Decreased left lower extremity flexibility:Hip Flexors    Decreased right lower extremity flexibility:  Hip Flexors               Lumbar/SI Evaluation  ROM:    AROM Lumbar:   Flexion:        Hands to lower leg with some sharp pain in the right low back upon return  Ext:                    Mild sharp pain with extension and standing    Side Bend:        Left:     Right:   Rotation:           Left:     Right:   Side Glide:        Left:     Right:           Lumbar  Myotomes:  normal            Lumbar DTR's:  not assessed      Cord Signs:  not assessed    Lumbar Dermtomes:  normal                Neural Tension/Mobility:  Lumbar:  Normal        Lumbar Palpation:  Palpation (lumbar): Increased tone in the right lumbar paraspinals.                                                         General     ROS    Assessment/Plan:    Patient is a 78 year old female with lumbar complaints.    Patient has the following significant findings with corresponding treatment plan.                Diagnosis 1: Low back pain Pain -  manual therapy, self management, education and home program  Decreased ROM/flexibility - manual therapy, therapeutic exercise and home program  Decreased joint mobility - manual therapy, therapeutic exercise and home program  Decreased strength - therapeutic exercise, therapeutic activities and home program  Decreased function - therapeutic activities and home program    Therapy Evaluation Codes:   1) History comprised of:   Personal factors that impact the plan of care:      None.    Comorbidity factors that impact the plan of care are:      Osteoarthritis.     Medications impacting care: None.  2) Examination of Body Systems comprised of:   Body structures and functions that impact the plan of care:      Lumbar spine.   Activity limitations that impact the plan of care are:      Lifting, Standing, Walking and Sleeping.  3) Clinical presentation characteristics are:   Stable/Uncomplicated.  4) Decision-Making    Low complexity using standardized patient assessment instrument and/or measureable assessment of functional outcome.  Cumulative Therapy Evaluation is: Low complexity.    Previous and current functional limitations:  (See Goal Flow Sheet for this information)    Short term and Long term goals: (See Goal Flow Sheet for this information)     Communication ability:  Patient appears to be able to clearly communicate and understand verbal and written communication and  follow directions correctly.  Treatment Explanation - The following has been discussed with the patient:   RX ordered/plan of care  Anticipated outcomes  Possible risks and side effects  This patient would benefit from PT intervention to resume normal activities.   Rehab potential is good.    Frequency: Once X week, once daily  Duration:  for 4 weeks  Discharge Plan:  Independent in home treatment program.  Reach maximal therapeutic benefit.    Please refer to the daily flowsheet for treatment today, total treatment time and time spent performing 1:1 timed codes.

## 2019-06-25 NOTE — TELEPHONE ENCOUNTER
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 Shyam Bourgeois 10/2008 - scheduled per pt    DATE RECEIVED: Jul 2, 2019    NOTES STATUS DETAILS   OFFICE NOTE from referring provider Internal [facility] 6/19/19   OFFICE NOTE from other specialist Internal Dr. Sánchez 1/30/14   DISCHARGE SUMMARY from hospital N/A    DISCHARGE REPORT from the ER N/A    OPERATIVE REPORT Internal 10/21/2008 Dr. Bourgeois   MEDICATION LIST Internal    IMPLANT RECORD/STICKER N/A    LABS     CBC/DIFF N/A    CULTURES N/A    INJECTIONS DONE IN RADIOLOGY N/A    MRI Internal 2/19/14   CT SCAN N/A    XRAYS (IMAGES & REPORTS) Internal 1/30/14   TUMOR     PATHOLOGY  Slides & report N/A

## 2019-06-28 ENCOUNTER — THERAPY VISIT (OUTPATIENT)
Dept: PHYSICAL THERAPY | Facility: CLINIC | Age: 79
End: 2019-06-28
Payer: COMMERCIAL

## 2019-06-28 DIAGNOSIS — M54.50 ACUTE RIGHT-SIDED LOW BACK PAIN WITHOUT SCIATICA: ICD-10-CM

## 2019-06-28 PROCEDURE — 97110 THERAPEUTIC EXERCISES: CPT | Mod: GP | Performed by: PHYSICAL THERAPIST

## 2019-06-28 PROCEDURE — 97140 MANUAL THERAPY 1/> REGIONS: CPT | Mod: GP | Performed by: PHYSICAL THERAPIST

## 2019-07-01 DIAGNOSIS — M54.50 LUMBAR PAIN: Primary | ICD-10-CM

## 2019-07-02 ENCOUNTER — TELEPHONE (OUTPATIENT)
Dept: ORTHOPEDICS | Facility: CLINIC | Age: 79
End: 2019-07-02

## 2019-07-02 ENCOUNTER — PRE VISIT (OUTPATIENT)
Dept: ORTHOPEDICS | Facility: CLINIC | Age: 79
End: 2019-07-02

## 2019-07-03 ENCOUNTER — ANCILLARY PROCEDURE (OUTPATIENT)
Dept: MRI IMAGING | Facility: CLINIC | Age: 79
End: 2019-07-03
Attending: ORTHOPAEDIC SURGERY
Payer: COMMERCIAL

## 2019-07-03 DIAGNOSIS — M54.50 LUMBAR PAIN: ICD-10-CM

## 2019-07-08 ENCOUNTER — THERAPY VISIT (OUTPATIENT)
Dept: PHYSICAL THERAPY | Facility: CLINIC | Age: 79
End: 2019-07-08
Payer: COMMERCIAL

## 2019-07-08 DIAGNOSIS — M54.50 ACUTE RIGHT-SIDED LOW BACK PAIN WITHOUT SCIATICA: ICD-10-CM

## 2019-07-08 PROCEDURE — 97110 THERAPEUTIC EXERCISES: CPT | Mod: GP | Performed by: PHYSICAL THERAPIST

## 2019-07-08 ASSESSMENT — ENCOUNTER SYMPTOMS
MUSCLE CRAMPS: 1
JOINT SWELLING: 1
BACK PAIN: 1
ARTHRALGIAS: 1
STIFFNESS: 1

## 2019-07-09 DIAGNOSIS — M85.80 OSTEOPENIA, UNSPECIFIED LOCATION: ICD-10-CM

## 2019-07-09 DIAGNOSIS — M85.88 OTHER SPECIFIED DISORDERS OF BONE DENSITY AND STRUCTURE, OTHER SITE: ICD-10-CM

## 2019-07-09 RX ORDER — ALENDRONATE SODIUM 35 MG/1
TABLET ORAL
Qty: 4 TABLET | Refills: 3 | Status: SHIPPED | OUTPATIENT
Start: 2019-07-09 | End: 2019-11-09

## 2019-07-09 NOTE — TELEPHONE ENCOUNTER
CW  Routing refill request to provider for review/approval because:  Dexa required every 2 years. Last done 6/1/2015.  Thanks,  Dianna Lunsford RN

## 2019-07-09 NOTE — TELEPHONE ENCOUNTER
"Alendronate 35MG  Last Written Prescription Date:  06/04/2019  Last Fill Quantity: 4,  # refills: 0   Last office visit: 6/18/2019 with prescribing provider:  Tess,  Last office visit from prescribing provider was on 11/16/2018   Future Office Visit:    Requested Prescriptions   Pending Prescriptions Disp Refills     alendronate (FOSAMAX) 35 MG tablet [Pharmacy Med Name: Alendronate Sodium Oral Tablet 35 MG] 4 tablet 0     Sig: TAKE 1 TABLET BY MOUTH EACH WEEK. TAKE WITH WATER 30 MINUTES BEFORE FOOD, DRINK & MEDS. STAY UPRIGHT FOR 30 MINUTES.       Bisphosphonates Failed - 7/9/2019  9:08 AM        Failed - Dexa on file within past 2 years     Please review last Dexa result.           Passed - Recent (12 mo) or future (30 days) visit within the authorizing provider's specialty     Patient had office visit in the last 12 months or has a visit in the next 30 days with authorizing provider or within the authorizing provider's specialty.  See \"Patient Info\" tab in inbasket, or \"Choose Columns\" in Meds & Orders section of the refill encounter.              Passed - Medication is active on med list        Passed - Patient is age 18 or older        Passed - Normal serum creatinine on file within past 12 months     Recent Labs   Lab Test 11/16/18  0955   CR 0.80               "

## 2019-07-09 NOTE — TELEPHONE ENCOUNTER
Pt seen in 6/19 for low back pain, and also did brief check-in on mood, and 6mo refills sent.  Given that, hard to pull her back just because her osteoporosis f/u was not completed.  Will send in #4 w/ 3 refills, to get her to her 11/19 yearly appt.  Did send in order for dexa at [facility]- would be great if she could have it done prior to her 11/19 appt.  Can you let her know?  Thanks_ CW

## 2019-07-11 DIAGNOSIS — M54.50 LUMBAR PAIN: Primary | ICD-10-CM

## 2019-07-12 ENCOUNTER — OFFICE VISIT (OUTPATIENT)
Dept: ORTHOPEDICS | Facility: CLINIC | Age: 79
End: 2019-07-12
Payer: COMMERCIAL

## 2019-07-12 ENCOUNTER — ANCILLARY PROCEDURE (OUTPATIENT)
Dept: GENERAL RADIOLOGY | Facility: CLINIC | Age: 79
End: 2019-07-12
Attending: ORTHOPAEDIC SURGERY
Payer: COMMERCIAL

## 2019-07-12 VITALS — BODY MASS INDEX: 26.63 KG/M2 | HEIGHT: 64 IN | WEIGHT: 156 LBS

## 2019-07-12 DIAGNOSIS — M41.50 DEGENERATIVE SCOLIOSIS IN ADULT PATIENT: ICD-10-CM

## 2019-07-12 DIAGNOSIS — M54.50 LUMBAR PAIN: Primary | ICD-10-CM

## 2019-07-12 ASSESSMENT — MIFFLIN-ST. JEOR: SCORE: 1167.61

## 2019-07-12 NOTE — NURSING NOTE
"Reason For Visit:   Chief Complaint   Patient presents with     Consult     low back pain        Primary MD: Roberta Delong  Ref. MD: Jessie     ?  No  Occupation retired.  Currently working? No.  Work status?  Retired.  Date of injury: 3 weeks ago   Type of injury: chronic .  Date of surgery: 2008    Type of surgery: Discectomy forspinal stenosis .  Smoker: No  Request smoking cessation information: No    Ht 1.626 m (5' 4\")   Wt 70.8 kg (156 lb)   BMI 26.78 kg/m      Pain Assessment  Patient Currently in Pain: Yes  0-10 Pain Scale: 5  Primary Pain Location: Back    Oswestry (GENARO) Questionnaire    OSWESTRY DISABILITY INDEX 7/8/2019   Count 10   Sum 14   Oswestry Score (%) 28   Some recent data might be hidden            Neck Disability Index (NDI) Questionnaire    No flowsheet data found.                Promis 10 Assessment    PROMIS 10 7/8/2019   In general, would you say your health is: Very good   In general, would you say your quality of life is: Very good   In general, how would you rate your physical health? Good   In general, how would you rate your mental health, including your mood and your ability to think? Good   In general, how would you rate your satisfaction with your social activities and relationships? Good   In general, please rate how well you carry out your usual social activities and roles Very good   To what extent are you able to carry out your everyday physical activities such as walking, climbing stairs, carrying groceries, or moving a chair? Completely   How often have you been bothered by emotional problems such as feeling anxious, depressed or irritable? Sometimes   How would you rate your fatigue on average? Mild   How would you rate your pain on average?   0 = No Pain  to  10 = Worst Imaginable Pain 5   In general, would you say your health is: 4   In general, would you say your quality of life is: 4   In general, how would you rate your physical health? 3   In " general, how would you rate your mental health, including your mood and your ability to think? 3   In general, how would you rate your satisfaction with your social activities and relationships? 3   In general, please rate how well you carry out your usual social activities and roles. (This includes activities at home, at work and in your community, and responsibilities as a parent, child, spouse, employee, friend, etc.) 4   To what extent are you able to carry out your everyday physical activities such as walking, climbing stairs, carrying groceries, or moving a chair? 5   In the past 7 days, how often have you been bothered by emotional problems such as feeling anxious, depressed, or irritable? 3   In the past 7 days, how would you rate your fatigue on average? 2   In the past 7 days, how would you rate your pain on average, where 0 means no pain, and 10 means worst imaginable pain? 5   Global Mental Health Score 13   Global Physical Health Score 15   PROMIS TOTAL - SUBSCORES 28   Some recent data might be hidden                Jose E Otero ATC
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
## 2019-08-12 ASSESSMENT — ANXIETY QUESTIONNAIRES
1. FEELING NERVOUS, ANXIOUS, OR ON EDGE: SEVERAL DAYS
7. FEELING AFRAID AS IF SOMETHING AWFUL MIGHT HAPPEN: NOT AT ALL
5. BEING SO RESTLESS THAT IT IS HARD TO SIT STILL: NOT AT ALL
4. TROUBLE RELAXING: NOT AT ALL
GAD7 TOTAL SCORE: 2
3. WORRYING TOO MUCH ABOUT DIFFERENT THINGS: SEVERAL DAYS
7. FEELING AFRAID AS IF SOMETHING AWFUL MIGHT HAPPEN: NOT AT ALL
GAD7 TOTAL SCORE: 2
2. NOT BEING ABLE TO STOP OR CONTROL WORRYING: NOT AT ALL
6. BECOMING EASILY ANNOYED OR IRRITABLE: NOT AT ALL

## 2019-08-12 ASSESSMENT — ENCOUNTER SYMPTOMS
WEAKNESS: 0
PARALYSIS: 0
LOSS OF CONSCIOUSNESS: 0
SEIZURES: 0
TREMORS: 0
NECK PAIN: 0
DISTURBANCES IN COORDINATION: 0
DIZZINESS: 0
HEADACHES: 0
MEMORY LOSS: 0
TINGLING: 0
NUMBNESS: 0
SPEECH CHANGE: 0

## 2019-08-13 ASSESSMENT — ANXIETY QUESTIONNAIRES: GAD7 TOTAL SCORE: 2

## 2019-08-14 NOTE — PROGRESS NOTES
Subjective:  HPI                    Objective:  System    Physical Exam    General     ROS    Assessment/Plan:    DISCHARGE REPORT    Progress reporting period is from 6/19/2019 to 7/8/2019.       SUBJECTIVE  Subjective changes noted by patient:  .  Subjective: Better in certain chairs now with support under the buttock.  Has been on her bike and did better than she thought.  PUlling in the stomach has helped.  Rode 20 miles.      Current pain level is NA  .     Previous pain level was  NA  .   Changes in function:  Yes (See Goal flowsheet attached for changes in current functional level)  Adverse reaction to treatment or activity: None    OBJECTIVE  Changes noted in objective findings:  Yes,   Objective: Left hip still shifting but pulling in abdominal region helps with pain with walking.  Decreased right hip mobility which may also be affecting her back.  Will see patient after she sees MD about MRI which shows multiple levels of DDD.       ASSESSMENT/PLAN  Updated problem list and treatment plan: Diagnosis 1:  Back pain  Pain -  manual therapy, self management, education and home program  Decreased ROM/flexibility - manual therapy, therapeutic exercise and home program  Decreased strength - therapeutic exercise, therapeutic activities and home program  Decreased function - therapeutic activities and home program  STG/LTGs have been met or progress has been made towards goals:  Yes (See Goal flow sheet completed today.)  Assessment of Progress: The patient's condition has potential to improve.  Self Management Plans:  Patient has been instructed in a home treatment program.    Christopherig continues to require the following intervention to meet STG and LTG's:  Patient needs to continue to work on the home exercise program.      Recommendations:  Will discharge as patient has not returned.    Please refer to the daily flowsheet for treatment today, total treatment time and time spent performing 1:1 timed  codes.

## 2019-08-15 ENCOUNTER — OFFICE VISIT (OUTPATIENT)
Dept: ANESTHESIOLOGY | Facility: CLINIC | Age: 79
End: 2019-08-15
Attending: ORTHOPAEDIC SURGERY
Payer: COMMERCIAL

## 2019-08-15 VITALS
RESPIRATION RATE: 16 BRPM | BODY MASS INDEX: 26.63 KG/M2 | HEIGHT: 64 IN | HEART RATE: 99 BPM | DIASTOLIC BLOOD PRESSURE: 78 MMHG | SYSTOLIC BLOOD PRESSURE: 135 MMHG | WEIGHT: 156 LBS

## 2019-08-15 DIAGNOSIS — M46.1 SACROILIITIS (H): Primary | ICD-10-CM

## 2019-08-15 ASSESSMENT — MIFFLIN-ST. JEOR: SCORE: 1167.61

## 2019-08-15 ASSESSMENT — PAIN SCALES - GENERAL: PAINLEVEL: MILD PAIN (3)
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
## 2019-08-15 NOTE — PATIENT INSTRUCTIONS
1. Referral to physical therapy.  Please call [phone] to make an appointment.     2. Schedule procedure.        Follow up: 4 to 6 weeks after your procedure           To speak with a nurse, schedule/reschedule/cancel a clinic appointment, or request a medication refill call: [phone]     You can also reach us by [employer]): [email or web address]    For refills, please call on Monday, 1 week before your medication runs out. The doctors are not always in clinic, so this gives us time to get your prescriptions ready.  Please let us know the name of the medication you are requesting a refill of.                  When calling to schedule your procedure appointment, also make your follow up clinic appointment 4 to 6 weeks after the procedure.    Please call [phone] to schedule, reschedule, or cancel your procedure appointment.   Phones are answered Monday - Friday from 7:30 - 4:00pm.  Leave a voicemail with your name, birth date, and phone number if no one is available to take your call.     Your procedure: Right sacroiliac joint injection     On the day of the procedure  1. Arrive 1 hour earlier than your scheduled time, to the [facility]  Address: [address] [place] [id number]  2. Check in on the 5th floor for your procedure    If you must reschedule your procedure more than two times, you must follow up in clinic before rescheduling again.        Preparing for your procedure    CAUTION - FAILURE TO FOLLOW THESE PRE-PROCEDURE INSTRUCTIONS WILL RESULT IN YOUR PROCEDURE BEING RESCHEDULED.            You must have a  take you home after your procedure. Transportation by taxi or para-transit is okay as long as you have a responsible adult accompany you. You must provide your 's full name and contact number at time of check in.     Fasting Protocol You may have NOTHING SOLID TO EAT 8 HOURS prior to arrival at the procedure area.     You may have CLEAR  LIQUIDS UP TO 2 HOURS prior to arrival.    Broth and candy are considered solid food and require an eight hour fast.     Clear liquids include water, clear fruit juice (no pulp), carbonated beverages, ice, black coffee, black tea, clear jello. No alcohol containing beverages.   Medications If you take any medications, DO NOT STOP. Take your medications as usual the day of your procedure with a sip of water AT LEAST 2 HOURS PRIOR TO ARRIVAL.    Antibiotics If you are currently taking antibiotics, you must complete the entire dose 7 days prior to your scheduled procedure. You must be clear of any signs or symptoms of infection. If you begin antibiotics, please contact our clinic for instructions.     Fever, Chills, or Rash If you experience a fever of higher than 100 degrees, chills, rash, or open wounds during the one week before your procedure, please call the clinic to see if you may proceed with your procedure.      Medication Hold List  **Patients under Cardiology/Neurology care should consult their provider prior to the pain procedure to verify pre-procedure medication instructions. The information below contains general guidelines.**    Blood Thinners If you are taking daily ASPIRIN, PLAVIX, OR OTHER BLOOD THINNERS SUCH AS COUMADIN/WARFARIN, we will need your prescribing doctor to sign a release permitting you to stop these medications. Once approved by your prescribing doctor - STOP ALL BLOOD THINNERS BASED ON THE TIME TABLE BELOW PRIOR TO YOUR PROCEDURE. If you have been instructed to stop WARFARIN(COUMADIN), you must have an INR lab drawn the day before your procedure. . Your INR must be within normal limits before we can perform your injection. MEDICATIONS CAN BE RESTARTED AFTER YOUR PROCEDURE.    14 DAY HOLD  Ticlid (ticlopidine)    10 DAY HOLD  Effient (Prasugel)    3 DAY HOLD  Xarelto (rivaroxaban) 7 DAY HOLD  Anacin, Bufferin, Ecotrin, Excedrin, Aggrenox (Aspirin)  Brilinta (ticagrelor)  Coumadin  (Warfarin)  Pradexa (Dabigatran)  Elmiron (Pentosan)  Plavix (Clopidogrel Bisulfate)  Pletal (Cilostazol)    24 HOUR HOLD  Lovenox (enoxaparin)  Agrylin (Anagrelide)                To speak with a nurse, schedule/reschedule/cancel a clinic appointment, or request a medication refill call: [phone]     You can also reach us by [employer]: [email or web address]

## 2019-08-15 NOTE — NURSING NOTE
AVS reviewed with pt and given copy.  Pre-procedure instructions reviewed, including NPO orders,  needed, and medications to hold.  Pt verbalized understanding and declined having questions at this time.     Coty Cancino, RN, BSN
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
## 2019-08-16 ENCOUNTER — TELEPHONE (OUTPATIENT)
Dept: PALLIATIVE MEDICINE | Facility: CLINIC | Age: 79
End: 2019-08-16

## 2019-08-29 ENCOUNTER — ANCILLARY PROCEDURE (OUTPATIENT)
Dept: RADIOLOGY | Facility: AMBULATORY SURGERY CENTER | Age: 79
End: 2019-08-29
Attending: ANESTHESIOLOGY
Payer: COMMERCIAL

## 2019-08-29 ENCOUNTER — ANESTHESIA EVENT (OUTPATIENT)
Dept: SURGERY | Facility: AMBULATORY SURGERY CENTER | Age: 79
End: 2019-08-29

## 2019-08-29 ENCOUNTER — ANESTHESIA (OUTPATIENT)
Dept: SURGERY | Facility: AMBULATORY SURGERY CENTER | Age: 79
End: 2019-08-29

## 2019-08-29 ENCOUNTER — HOSPITAL ENCOUNTER (OUTPATIENT)
Facility: AMBULATORY SURGERY CENTER | Age: 79
End: 2019-08-29
Attending: ANESTHESIOLOGY
Payer: COMMERCIAL

## 2019-08-29 VITALS
SYSTOLIC BLOOD PRESSURE: 126 MMHG | HEART RATE: 73 BPM | DIASTOLIC BLOOD PRESSURE: 76 MMHG | OXYGEN SATURATION: 97 % | HEIGHT: 64 IN | BODY MASS INDEX: 26.46 KG/M2 | WEIGHT: 155 LBS | RESPIRATION RATE: 15 BRPM | TEMPERATURE: 97.4 F

## 2019-08-29 DIAGNOSIS — R52 PAIN: ICD-10-CM

## 2019-08-29 RX ORDER — LIDOCAINE HYDROCHLORIDE 10 MG/ML
INJECTION, SOLUTION EPIDURAL; INFILTRATION; INTRACAUDAL; PERINEURAL PRN
Status: DISCONTINUED | OUTPATIENT
Start: 2019-08-29 | End: 2019-08-29 | Stop reason: HOSPADM

## 2019-08-29 RX ORDER — IOPAMIDOL 408 MG/ML
INJECTION, SOLUTION INTRATHECAL PRN
Status: DISCONTINUED | OUTPATIENT
Start: 2019-08-29 | End: 2019-08-29 | Stop reason: HOSPADM

## 2019-08-29 RX ORDER — BUPIVACAINE HYDROCHLORIDE 2.5 MG/ML
INJECTION, SOLUTION EPIDURAL; INFILTRATION; INTRACAUDAL PRN
Status: DISCONTINUED | OUTPATIENT
Start: 2019-08-29 | End: 2019-08-29 | Stop reason: HOSPADM

## 2019-08-29 RX ORDER — METHYLPREDNISOLONE ACETATE 40 MG/ML
INJECTION, SUSPENSION INTRA-ARTICULAR; INTRALESIONAL; INTRAMUSCULAR; SOFT TISSUE PRN
Status: DISCONTINUED | OUTPATIENT
Start: 2019-08-29 | End: 2019-08-29 | Stop reason: HOSPADM

## 2019-08-29 ASSESSMENT — MIFFLIN-ST. JEOR: SCORE: 1163.08

## 2019-08-29 NOTE — DISCHARGE INSTRUCTIONS
Home Care Instructions after a Sacroiliac Joint Injection        Activity  -You may resume most normal activity levels with the exception of strenuous activity. It is important for us to know if your pain with normal activity is relieved after this injection.  -DO NOT shower for 24 hours  -DO NOT remove bandaid for 24 hours    Pain  -You may experience soreness at the injection site for one or two days  -You may use an ice pack for 20 minutes every 2 hours for the first 24 hours  -You may use a heating pad after the first 24 hours  -You may use Tylenol (acetaminophen) every 4 hours or other pain medicines as directed by your physician    You may experience numbness radiating into your legs or arms (depending on the procedure location). This numbness may last several hours. Until sensation returns to normal; please use caution in walking, climbing stairs, and stepping out of your vehicle, etc.      DID YOU RECEIVE STEROIDS TODAY?  Yes    Common side effects of steroids:  Not everyone will experience corticosteroid side effects. If side effects are experienced, they will gradually subside in the 7-10 day period following an injection. Most common side effects include:  -Flushed face and/or chest  -Feeling of warmth, particularly in the face but could be an overall feeling of warmth  -Increased blood sugar in diabetic patients  -Menstrual irregularities my occur. If taking hormone-based birth control an alternate method of birth control is recommended  -Sleep disturbances and/or mood swings are possible  -Leg cramps      PLEASE KEEP TRACK OF YOUR SYMPTOMS AND NOTE YOUR IMPROVEMENT FOR YOUR DOCTOR.     Please contact us if you have:  -Severe pain  -Fever more than 101.5 degrees Fahrenheit  -Signs of infection at the injection site (redness, swelling, or drainage)    If you have questions, please contact our office at [phone] between the hours of 7:00 am and 3:00 pm Monday through Friday. After office hours you can  contact the on call provider by dialing [phone]. If you need immediate attention, we recommend that you go to a hospital emergency room or dial [phone].

## 2019-08-29 NOTE — H&P
ABBREVIATED H&P [facility]      Patient Name: Lawrence Pemberton   MRN: [id number]   YOB: 1940     1. Reason for Procedure:  Procedure Summary     Date:  08/29/19 Room / Location:   PROCEDURE ROOM 06 / [facility]    Anesthesia Start:   Anesthesia Stop:      Procedure:  Right Sacroiliac Joint Injection (Right Back) Diagnosis:  (Back Pain)    Provider:  Corrine Hall MD Responsible Provider:      Anesthesia Type:  Not recorded ASA Status:  Not recorded          2. History:   Past Medical History:   Diagnosis Date     Anxiety state, unspecified     paxil 10mg/d helps     BCC (basal cell carcinoma)     R side of nose, ? 2002     ESBL (extended spectrum beta-lactamase) producing bacteria infection     w/ UTI in 7/10, sx's improved w/ macrobid     Major depressive disorder, recurrent, moderate (H) 1996    w/ anxiety, better on paroxetine x 10+ yrs, sx's returned after 1-2 months     Osteoarthritis     neg RA w/u, rheum consult in 12/07     Osteopenia     '10 dexa- worst t-score -2.0, cont ca/exercise     Other and unspecified disc disorder of lumbar region     surgery 10/08, helped some pains, still has arthritic jt pains, PT helps- [facility]       Comorbidities: None    Any history of sleep apnea? No    Any history of problems with sedation? No    3. Physical:    General: Normal  Skin:  Normal.  Respiratory: Clear to auscultation bilateral, no wheezing  Cardio:  Regular rate and rhythm  Abdomen: Soft, nontender, nondistended, no palpable masses.  Musculoskeletal: Normal  Neuro: Sensory exam normal, motor exam 5/5, bilateral upper and lower extremities   Other:     4. Current Medications (if not in Epic):   Current Outpatient Medications   Medication Sig Dispense Refill     atorvastatin (LIPITOR) 20 MG tablet Take 1 tablet (20 mg) by mouth daily 90 tablet 3     escitalopram (LEXAPRO) 5 MG tablet Take 1 tablet (5 mg)  by mouth daily 90 tablet 1     alendronate (FOSAMAX) 35 MG tablet TAKE 1 TABLET BY MOUTH EACH WEEK. TAKE WITH WATER 30 MINUTES BEFORE FOOD, DRINK & MEDS. STAY UPRIGHT FOR 30 MINUTES. 4 tablet 3     diclofenac (VOLTAREN) 1 % GEL topical gel Apply 4 grams to knees or 2 grams to hands four times daily using enclosed dosing card. 100 g 1        5. Allergies and Reactions:  is allergic to no known drug allergies.       Corrine Hall MD    Pain Medicine  Department of Anesthesiology  [facility]

## 2019-08-29 NOTE — PROCEDURES
Patient: Lawrence Pemberton Age: 78 year old   MRN: [id number] Attending: Dr. Hall     Date of Visit: August 29, 2019      PAIN MEDICINE CLINIC PROCEDURE NOTE    ATTENDING CLINICIAN:    Corrine Hall MD    PREPROCEDURE DIAGNOSES:  1.  Right side low back pain   2.  Sacroiliitis - right side    PROCEDURE(S) PERFORMED:  1.  Right sacroiliac joint injection  3.  Fluoroscopic guidance for the above-named procedure(s)      ANESTHESIA:  Local.    BLOOD LOSS:  Minimal.    DRAINS AND SPECIMENS:  None.    COMPLICATIONS:  None.    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  The patient stated that the patient was in their usual state of health and denied recent anticoagulant use or recent infections.  Therefore, the plan is to perform above mentioned procedures.     Procedure Details:  The patient was met in the procedure room, where the patient was identified by name, medical record number and date of birth.  All of the patient s last minute questions were answered. Written informed consent was obtained and saved in the electronic medical record, after the risks, benefits, and alternatives were discussed with the patient.      A formal time-out procedure was performed, as per protocol, including patient name, title of procedure, and site of procedure, and all in the room concurred.  Routine monitors were applied.      The patient was placed in the prone position on the procedure room table.  All pressure points were checked and comfortably padded.  Routine monitors were placed.  Vital signs were stable.    A chlorhexidine prep was completed followed by sterile draping per standard procedure.     AP fluoroscopic guidance was used to identify the right SI joint(s), with slight contralateral oblique tilt, until the joint was maximally visualized.   After 1% lidocaine infiltration using a 25 gauge 1.5 inch needle, a 3.5 inch spinal needle was  introduced and advanced through the anesthetized plane and advanced to the joint space.  Depth was confirmed with lateral fluoroscopic guidance. After negative aspiration for heme, we injected 0.5 ml of Isovue contrast into SI joint. Images obtained.      After negative aspiration for heme, 2.5 mL of a treatment mixture containing 1 mL of depomedrol (40 mg/ml) and 2 mL of bupivacaine 0.25% was injected into the SI joint. The needle was then removed.      Light pressure was held at the puncture site(s) to prevent ecchymosis and oozing.  The patient's skin was cleansed, and hemostasis was confirmed.  Band-aids were applied to the needle injection site(s).      Condition:    The patient remained awake and alert throughout the procedure.  The patient tolerated the procedure well and was monitored for approximately 15 minutes afterward in the post procedure area.  There were no immediate post procedure complications noted.  The patient was then discharged to home as per protocol.      Pre-procedure pain score: 4/10  Post-procedure pain score: 3/10

## 2019-09-03 ENCOUNTER — TELEPHONE (OUTPATIENT)
Dept: ANESTHESIOLOGY | Facility: CLINIC | Age: 79
End: 2019-09-03

## 2019-09-03 NOTE — TELEPHONE ENCOUNTER
RNCC called to speak with pt about symptoms.  She had right SI injection 8/29/19, from which she received approximately 2-3 days of notably reduced pain.  Over the weekend, pt reports that pain returned, feeling similarly to how she did prior to the injection.      Pt denies new onset weakness is lower extremities, no loss of bowel or bladder function.      Pt advised to try OTC tylenol and ibuprofen, alternating medications, using as directed on packaging and also heat/cold therapy.  Pt encouraged to do activity as tolerated.     Pt instructed to call back clinic on Friday if her symptoms have not started to get better or resolve. She verbalized understanding and is agreeable to plan.     Coty Cancino, RN, BSN

## 2019-09-03 NOTE — TELEPHONE ENCOUNTER
Health Call Center    Phone Message    May a detailed message be left on voicemail: yes    Reason for Call: Symptoms or Concerns     If patient has red-flag symptoms, warm transfer to triage line    Current symptom or concern: pt calling to talk to a nurse about her pain after her injection she had on 8/29/2019. She called the oncall Dr and they suggested to call the clinic today. Please call the pt to discuss further.      Symptoms have been present for:  5 day(s)    Has patient previously been seen for this? Yes    By Dr Corrine Hall    Date: 8/29/2019    Are there any new or worsening symptoms? Yes: she was ok for the 2 days and the pain started.      Action Taken: Message routed to:  [facility] clinic

## 2019-09-30 ENCOUNTER — HEALTH MAINTENANCE LETTER (OUTPATIENT)
Age: 79
End: 2019-09-30

## 2019-10-08 ENCOUNTER — ALLIED HEALTH/NURSE VISIT (OUTPATIENT)
Dept: NURSING | Facility: CLINIC | Age: 79
End: 2019-10-08
Payer: COMMERCIAL

## 2019-10-08 DIAGNOSIS — Z23 NEED FOR PROPHYLACTIC VACCINATION AND INOCULATION AGAINST INFLUENZA: Primary | ICD-10-CM

## 2019-10-08 PROCEDURE — 90662 IIV NO PRSV INCREASED AG IM: CPT

## 2019-10-08 PROCEDURE — G0008 ADMIN INFLUENZA VIRUS VAC: HCPCS

## 2019-10-08 PROCEDURE — 99207 ZZC NO CHARGE NURSE ONLY: CPT

## 2019-10-10 ENCOUNTER — THERAPY VISIT (OUTPATIENT)
Dept: PHYSICAL THERAPY | Facility: CLINIC | Age: 79
End: 2019-10-10
Payer: COMMERCIAL

## 2019-10-10 DIAGNOSIS — M54.50 ACUTE RIGHT-SIDED LOW BACK PAIN WITHOUT SCIATICA: Primary | ICD-10-CM

## 2019-10-10 PROCEDURE — 97110 THERAPEUTIC EXERCISES: CPT | Mod: GP | Performed by: PHYSICAL THERAPIST

## 2019-10-10 PROCEDURE — 97140 MANUAL THERAPY 1/> REGIONS: CPT | Mod: GP | Performed by: PHYSICAL THERAPIST

## 2019-10-10 NOTE — PROGRESS NOTES
Subjective:  HPI                    Objective:  System    Physical Exam    General     ROS    Assessment/Plan:    PROGRESS  REPORT    Progress reporting period is from 7/8/2019 to 10/10/2019 .       SUBJECTIVE  Subjective changes noted by patient:  .  Subjective: Patient returns after a visit with a surgeon and the pain clinic.  Surgeon did not recommend surgery.  Also had SI injection on the right.  Had more pain afterwards but 10 days later it felt better but now worse again.  Sitting is still the worst.  Trying to prop up hip and this helps.  Has tried pillows.  Moving around feels it but no lower leg pain.  Still going to do the pool exercise 2-3 days a week.  Biking was OK.  Has a back brace and this helps at time.  Sleeping OK.      Current pain level is 4/10  .     Previous pain level was  NA  .   Changes in function:  Yes (See Goal flowsheet attached for changes in current functional level)  Adverse reaction to treatment or activity: None    OBJECTIVE  Changes noted in objective findings:  Yes,   Objective: Justine has pain at the right SI joint/L5 with sitting.  Better with 1/2 lumbar roll.  Sore with palpation over the right SI.  Add more abdominal strenthening and back strengthening in standing as tolerated.       ASSESSMENT/PLAN  Updated problem list and treatment plan: Diagnosis 1:  Back pain  Pain -  manual therapy, self management, education and home program  Decreased ROM/flexibility - manual therapy, therapeutic exercise and home program  Decreased joint mobility - manual therapy, therapeutic exercise and home program  Decreased strength - therapeutic exercise, therapeutic activities and home program  Decreased function - therapeutic activities and home program  STG/LTGs have been met or progress has been made towards goals:  Yes (See Goal flow sheet completed today.)  Assessment of Progress: The patient's condition is improving.  The patient's condition has potential to improve.  Self Management  Plans:  Patient has been instructed in a home treatment program.  I have re-evaluated this patient and find that the nature, scope, duration and intensity of the therapy is appropriate for the medical condition of the patient.  Solveig continues to require the following intervention to meet STG and LTG's:  PT    Recommendations:  This patient would benefit from continued therapy.     Frequency:  1 X a month, once daily  Duration:  for 2 months        Please refer to the daily flowsheet for treatment today, total treatment time and time spent performing 1:1 timed codes.

## 2019-10-31 ENCOUNTER — THERAPY VISIT (OUTPATIENT)
Dept: PHYSICAL THERAPY | Facility: CLINIC | Age: 79
End: 2019-10-31
Payer: COMMERCIAL

## 2019-10-31 DIAGNOSIS — M54.50 ACUTE RIGHT-SIDED LOW BACK PAIN WITHOUT SCIATICA: Primary | ICD-10-CM

## 2019-10-31 PROCEDURE — 97112 NEUROMUSCULAR REEDUCATION: CPT | Mod: GP | Performed by: PHYSICAL THERAPIST

## 2019-10-31 PROCEDURE — 97110 THERAPEUTIC EXERCISES: CPT | Mod: GP | Performed by: PHYSICAL THERAPIST

## 2019-10-31 NOTE — PROGRESS NOTES
DISCHARGE REPORT    Progress reporting period is from 6/19/19 to 10/31/19.       SUBJECTIVE  Subjective: Patient says pain hasn't been too bad this past week, unless she is in prolonged sitting. Continuing pool exercises and gets good relief. Biking has been okay, shoots for 20 miles.  Went to [place] and walked quite a bit, close to 5 miles each day. Used hiking poles which helped her feel stable. Likes to walk outside more than on the treadmill or track.      Current Pain level: (patient didn't know how to rate, but not bad).     Previous pain level was  NA  .   Changes in function:  Yes (See Goal flowsheet attached for changes in current functional level)  Adverse reaction to treatment or activity: None    OBJECTIVE  Changes noted in objective findings:  The objective findings below are from DOS 10/31/19.  Objective: Patient is experiencing much less pain than initially. Pain across low back, mostly at right SI joint. No radiating pain down from back. Demonstrates Trendelenberg hip drop when standing on right leg.    ASSESSMENT/PLAN  Updated problem list and treatment plan: Diagnosis 1:  Acute right-sided LBP   STG/LTGs have been met or progress has been made towards goals:  Yes (See Goal flow sheet completed today.)  Assessment of Progress: The patient's condition is improving.  Self Management Plans:  Patient has been instructed in a home treatment program.  Patient  has been instructed in self management of symptoms.  I have re-evaluated this patient and find that the nature, scope, duration and intensity of the therapy is appropriate for the medical condition of the patient.  Solveig continues to require the following intervention to meet STG and LTG's:  PT intervention is no longer required to meet STG/LTG.    Recommendations:  This patient is ready to be discharged from therapy and continue their home treatment program.    Please refer to the daily flowsheet for treatment today, total treatment time and  time spent performing 1:1 timed codes.

## 2019-11-09 DIAGNOSIS — M85.80 OSTEOPENIA, UNSPECIFIED LOCATION: ICD-10-CM

## 2019-11-11 RX ORDER — ALENDRONATE SODIUM 35 MG/1
TABLET ORAL
Qty: 4 TABLET | Refills: 0 | Status: SHIPPED | OUTPATIENT
Start: 2019-11-11 | End: 2019-12-15

## 2019-11-11 NOTE — TELEPHONE ENCOUNTER
"Prescription approved per [facility].  Margaret LEE RN    Last Written Prescription Date:  7/9/2019  Last Fill Quantity: 4,  # refills: 3   Last office visit: 6/18/2019 with prescribing provider:     Future Office Visit:   Next 5 appointments (look out 90 days)    Nov 19, 2019  9:00 AM CST  PHYSICAL with Roberta Delong MD  [facility] ([facility] [address] [facility] [address] [phone]         Requested Prescriptions   Pending Prescriptions Disp Refills     alendronate (FOSAMAX) 35 MG tablet [Pharmacy Med Name: Alendronate Sodium Oral Tablet 35 MG] 4 tablet 2     Sig: TAKE 1 TABLET BY MOUTH EACH WEEK. TAKE WITH WATER 30 MINUTES BEFORE FOOD, DRINK & MEDS. STAY UPRIGHT FOR 30 MINUTES.       Bisphosphonates Failed - 11/9/2019  1:02 PM        Failed - Dexa on file within past 2 years     Please review last Dexa result.           Passed - Recent (12 mo) or future (30 days) visit within the authorizing provider's specialty     Patient has had an office visit with the authorizing provider or a provider within the authorizing providers department within the previous 12 mos or has a future within next 30 days. See \"Patient Info\" tab in inbasket, or \"Choose Columns\" in Meds & Orders section of the refill encounter.              Passed - Medication is active on med list        Passed - Patient is age 18 or older        Passed - Normal serum creatinine on file within past 12 months     Recent Labs   Lab Test 11/16/18  0955   CR 0.80             "

## 2019-11-18 ASSESSMENT — ACTIVITIES OF DAILY LIVING (ADL): CURRENT_FUNCTION: NO ASSISTANCE NEEDED

## 2019-11-19 ENCOUNTER — TELEPHONE (OUTPATIENT)
Dept: PALLIATIVE MEDICINE | Facility: CLINIC | Age: 79
End: 2019-11-19

## 2019-11-19 ENCOUNTER — OFFICE VISIT (OUTPATIENT)
Dept: FAMILY MEDICINE | Facility: CLINIC | Age: 79
End: 2019-11-19
Payer: COMMERCIAL

## 2019-11-19 VITALS
BODY MASS INDEX: 26.72 KG/M2 | TEMPERATURE: 97.9 F | OXYGEN SATURATION: 96 % | DIASTOLIC BLOOD PRESSURE: 76 MMHG | WEIGHT: 156.5 LBS | HEIGHT: 64 IN | HEART RATE: 74 BPM | SYSTOLIC BLOOD PRESSURE: 131 MMHG | RESPIRATION RATE: 14 BRPM

## 2019-11-19 DIAGNOSIS — H90.6 MIXED CONDUCTIVE AND SENSORINEURAL HEARING LOSS OF BOTH EARS: ICD-10-CM

## 2019-11-19 DIAGNOSIS — M15.9 OSTEOARTHRITIS OF MULTIPLE JOINTS, UNSPECIFIED OSTEOARTHRITIS TYPE: ICD-10-CM

## 2019-11-19 DIAGNOSIS — E78.5 HYPERLIPIDEMIA WITH TARGET LDL LESS THAN 130: ICD-10-CM

## 2019-11-19 DIAGNOSIS — F33.1 MAJOR DEPRESSIVE DISORDER, RECURRENT, MODERATE (H): ICD-10-CM

## 2019-11-19 DIAGNOSIS — Z00.00 ENCOUNTER FOR ROUTINE ADULT HEALTH EXAMINATION WITHOUT ABNORMAL FINDINGS: Primary | ICD-10-CM

## 2019-11-19 DIAGNOSIS — M43.17 SPONDYLOLISTHESIS OF LUMBOSACRAL REGION: ICD-10-CM

## 2019-11-19 DIAGNOSIS — F41.1 ANXIETY STATE: ICD-10-CM

## 2019-11-19 DIAGNOSIS — M85.80 OSTEOPENIA, UNSPECIFIED LOCATION: ICD-10-CM

## 2019-11-19 DIAGNOSIS — R09.81 NASAL CONGESTION: ICD-10-CM

## 2019-11-19 DIAGNOSIS — Z78.0 ASYMPTOMATIC MENOPAUSAL STATE: ICD-10-CM

## 2019-11-19 LAB
ANION GAP SERPL CALCULATED.3IONS-SCNC: 8 MMOL/L (ref 3–14)
BUN SERPL-MCNC: 18 MG/DL (ref 7–30)
CALCIUM SERPL-MCNC: 9.2 MG/DL (ref 8.5–10.1)
CHLORIDE SERPL-SCNC: 105 MMOL/L (ref 94–109)
CHOLEST SERPL-MCNC: 209 MG/DL
CO2 SERPL-SCNC: 25 MMOL/L (ref 20–32)
CREAT SERPL-MCNC: 0.82 MG/DL (ref 0.52–1.04)
GFR SERPL CREATININE-BSD FRML MDRD: 68 ML/MIN/{1.73_M2}
GLUCOSE SERPL-MCNC: 94 MG/DL (ref 70–99)
HDLC SERPL-MCNC: 49 MG/DL
LDLC SERPL CALC-MCNC: 141 MG/DL
NONHDLC SERPL-MCNC: 160 MG/DL
POTASSIUM SERPL-SCNC: 4.1 MMOL/L (ref 3.4–5.3)
SODIUM SERPL-SCNC: 138 MMOL/L (ref 133–144)
TRIGL SERPL-MCNC: 96 MG/DL

## 2019-11-19 PROCEDURE — 36415 COLL VENOUS BLD VENIPUNCTURE: CPT | Performed by: FAMILY MEDICINE

## 2019-11-19 PROCEDURE — 80048 BASIC METABOLIC PNL TOTAL CA: CPT | Performed by: FAMILY MEDICINE

## 2019-11-19 PROCEDURE — 96127 BRIEF EMOTIONAL/BEHAV ASSMT: CPT | Mod: 59 | Performed by: FAMILY MEDICINE

## 2019-11-19 PROCEDURE — 99213 OFFICE O/P EST LOW 20 MIN: CPT | Mod: 25 | Performed by: FAMILY MEDICINE

## 2019-11-19 PROCEDURE — 80061 LIPID PANEL: CPT | Performed by: FAMILY MEDICINE

## 2019-11-19 PROCEDURE — 99397 PER PM REEVAL EST PAT 65+ YR: CPT | Performed by: FAMILY MEDICINE

## 2019-11-19 PROCEDURE — 96127 BRIEF EMOTIONAL/BEHAV ASSMT: CPT | Performed by: FAMILY MEDICINE

## 2019-11-19 RX ORDER — ATORVASTATIN CALCIUM 20 MG/1
20 TABLET, FILM COATED ORAL DAILY
Qty: 90 TABLET | Refills: 3 | Status: SHIPPED | OUTPATIENT
Start: 2019-11-19 | End: 2020-12-18

## 2019-11-19 RX ORDER — ESCITALOPRAM OXALATE 5 MG/1
5 TABLET ORAL DAILY
Qty: 90 TABLET | Refills: 1 | Status: SHIPPED | OUTPATIENT
Start: 2019-11-19 | End: 2020-05-13

## 2019-11-19 ASSESSMENT — PAIN SCALES - GENERAL: PAINLEVEL: NO PAIN (0)

## 2019-11-19 ASSESSMENT — ANXIETY QUESTIONNAIRES
IF YOU CHECKED OFF ANY PROBLEMS ON THIS QUESTIONNAIRE, HOW DIFFICULT HAVE THESE PROBLEMS MADE IT FOR YOU TO DO YOUR WORK, TAKE CARE OF THINGS AT HOME, OR GET ALONG WITH OTHER PEOPLE: SOMEWHAT DIFFICULT
GAD7 TOTAL SCORE: 7
3. WORRYING TOO MUCH ABOUT DIFFERENT THINGS: SEVERAL DAYS
2. NOT BEING ABLE TO STOP OR CONTROL WORRYING: SEVERAL DAYS
1. FEELING NERVOUS, ANXIOUS, OR ON EDGE: SEVERAL DAYS
7. FEELING AFRAID AS IF SOMETHING AWFUL MIGHT HAPPEN: SEVERAL DAYS
6. BECOMING EASILY ANNOYED OR IRRITABLE: SEVERAL DAYS
5. BEING SO RESTLESS THAT IT IS HARD TO SIT STILL: SEVERAL DAYS

## 2019-11-19 ASSESSMENT — PATIENT HEALTH QUESTIONNAIRE - PHQ9
SUM OF ALL RESPONSES TO PHQ QUESTIONS 1-9: 1
5. POOR APPETITE OR OVEREATING: SEVERAL DAYS

## 2019-11-19 ASSESSMENT — ACTIVITIES OF DAILY LIVING (ADL): CURRENT_FUNCTION: NO ASSISTANCE NEEDED

## 2019-11-19 ASSESSMENT — MIFFLIN-ST. JEOR: SCORE: 1169.88
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
## 2019-11-19 NOTE — TELEPHONE ENCOUNTER

## 2019-11-19 NOTE — LETTER
November 19, 2019    Lawrence Pemberton  [address] [place] [address]    Dear Lawrence,             Welcome to the [facility].  We are located at [address] [place] [address] [place] [address]. Your appointment at the [facility] has been scheduled on Wednesday December 18, 2019 at 9:00a with Marlon Bell MD .    At your first visit, you will meet your team of caregivers who will help you to develop pain management strategies that will last a lifetime. You will meet with our support staff to review your insurance information, and collect your co-payment if required by your insurance company. You will also meet with a medical pain specialist and care coordinator who will assess your pain and develop a plan of care for your successful pain rehabilitation. You should expect to spend 1-2 hours at your first visit with us. Usually, patients work with us for a period of 6-12 months, and eventually return to their primary doctor once their pain management has stabilized.      To help us make your visit go as smoothly as possible, please bring the following items with you on your visit:     Completed Pain Questionnaire enclosed in this packet.  If you do not bring the completed questionnaire, we may have to reschedule your appointment.  List of any medicines that you are currently taking or have been prescribed  Important NON-[place] medical information such as medical records or tests results (X-rays, or laboratory tests)  Your health insurance card  Financial resources to cover your co-payment or balance due at the time of service (cash, personal check, Visa, and MasterCard are acceptable methods of payment)     Due to the demand for new patient evaluations, you must notify the scheduling department 48 hours in advance if you are not able to keep this appointment. Failure to do so could affect your ability to reschedule with our clinic. Please be aware that we will not  prescribe any medications at your first visit.     Please call [phone] with any questions regarding your appointment. We look forward to meeting you and working to address your health care needs.     Sincerely,      [facility]

## 2019-11-19 NOTE — NURSING NOTE
"Chief Complaint   Patient presents with     Medicare Visit     patient is fasting      /76 (BP Location: Right arm, Patient Position: Sitting, Cuff Size: Adult Regular)   Pulse 74   Temp 97.9  F (36.6  C) (Oral)   Resp 14   Ht 1.626 m (5' 4\")   Wt 71 kg (156 lb 8 oz)   LMP  (Approximate)   SpO2 96%   Breastfeeding No   BMI 26.86 kg/m   Estimated body mass index is 26.86 kg/m  as calculated from the following:    Height as of this encounter: 1.626 m (5' 4\").    Weight as of this encounter: 71 kg (156 lb 8 oz).  bp completed using cuff size: regular      Health Maintenance addressed:  NONE    N/a  Chrissie Kirby CMA on 11/19/2019 at 9:11 AM                "

## 2019-11-20 ASSESSMENT — ANXIETY QUESTIONNAIRES: GAD7 TOTAL SCORE: 7

## 2019-11-21 NOTE — RESULT ENCOUNTER NOTE
Here are your lab results from your recent visit...  -Your basic metabolic panel (which includes electrolyte levels, blood sugar level and kidney function tests) looks good/normal.  -Your cholesterol panel looks worse with a higher LDL (the bad cholesterol) and a stable HDL (the good cholesterol).  I wonder if you could have been missing your lipitor medication?      Please let me know if you have any questions.  Best,   Branden Delong MD

## 2019-12-05 ENCOUNTER — TRANSFERRED RECORDS (OUTPATIENT)
Dept: HEALTH INFORMATION MANAGEMENT | Facility: CLINIC | Age: 79
End: 2019-12-05

## 2019-12-06 ENCOUNTER — HOSPITAL ENCOUNTER (OUTPATIENT)
Dept: MAMMOGRAPHY | Facility: CLINIC | Age: 79
End: 2019-12-06
Attending: FAMILY MEDICINE
Payer: COMMERCIAL

## 2019-12-06 ENCOUNTER — HOSPITAL ENCOUNTER (OUTPATIENT)
Dept: BONE DENSITY | Facility: CLINIC | Age: 79
Discharge: HOME | End: 2019-12-06
Attending: FAMILY MEDICINE | Admitting: FAMILY MEDICINE
Payer: COMMERCIAL

## 2019-12-06 DIAGNOSIS — Z12.31 VISIT FOR SCREENING MAMMOGRAM: ICD-10-CM

## 2019-12-06 DIAGNOSIS — Z78.0 ASYMPTOMATIC MENOPAUSAL STATE: ICD-10-CM

## 2019-12-06 PROCEDURE — 77080 DXA BONE DENSITY AXIAL: CPT

## 2019-12-06 PROCEDURE — 77067 SCR MAMMO BI INCL CAD: CPT

## 2019-12-15 ENCOUNTER — HEALTH MAINTENANCE LETTER (OUTPATIENT)
Age: 79
End: 2019-12-15

## 2019-12-15 DIAGNOSIS — M85.80 OSTEOPENIA, UNSPECIFIED LOCATION: ICD-10-CM

## 2019-12-16 RX ORDER — ALENDRONATE SODIUM 35 MG/1
TABLET ORAL
Qty: 12 TABLET | Refills: 2 | Status: SHIPPED | OUTPATIENT
Start: 2019-12-16 | End: 2020-08-25

## 2019-12-16 NOTE — TELEPHONE ENCOUNTER
"Prescription approved per [facility] Refill Protocol.  Margaret LEE RN    Last Written Prescription Date:  11/11/2019  Last Fill Quantity: 4,  # refills: 0   Last office visit: 11/19/2019 with prescribing provider:     Future Office Visit:    Requested Prescriptions   Pending Prescriptions Disp Refills     alendronate (FOSAMAX) 35 MG tablet [Pharmacy Med Name: Alendronate Sodium Oral Tablet 35 MG] 4 tablet 0     Sig: TAKE ONE TABLET BY MOUTH ONCE WEEKLY, TAKE WITH WATER 30 MINUTES BEFORE FOOD, DRINK & MEDS. STAY UPRIGHT FOR 30 MINUTES       Bisphosphonates Passed - 12/15/2019 11:44 PM        Passed - Recent (12 mo) or future (30 days) visit within the authorizing provider's specialty     Patient has had an office visit with the authorizing provider or a provider within the authorizing providers department within the previous 12 mos or has a future within next 30 days. See \"Patient Info\" tab in inbasket, or \"Choose Columns\" in Meds & Orders section of the refill encounter.              Passed - Dexa on file within past 2 years     Please review last Dexa result.           Passed - Medication is active on med list        Passed - Patient is age 18 or older        Passed - Normal serum creatinine on file within past 12 months     Recent Labs   Lab Test 11/19/19  1037   CR 0.82             "

## 2019-12-18 ENCOUNTER — OFFICE VISIT (OUTPATIENT)
Dept: PALLIATIVE MEDICINE | Facility: CLINIC | Age: 79
End: 2019-12-18
Attending: FAMILY MEDICINE
Payer: COMMERCIAL

## 2019-12-18 VITALS — HEART RATE: 78 BPM | DIASTOLIC BLOOD PRESSURE: 71 MMHG | SYSTOLIC BLOOD PRESSURE: 134 MMHG | OXYGEN SATURATION: 97 %

## 2019-12-18 DIAGNOSIS — M21.70 LEG LENGTH DISCREPANCY: ICD-10-CM

## 2019-12-18 DIAGNOSIS — M43.16 SPONDYLOLISTHESIS OF LUMBAR REGION: Primary | ICD-10-CM

## 2019-12-18 DIAGNOSIS — M53.3 SACROILIAC JOINT DYSFUNCTION OF RIGHT SIDE: ICD-10-CM

## 2019-12-18 PROCEDURE — 99207 ZZC CONSULT E&M CHANGED TO INITIAL LEVEL: CPT | Performed by: PHYSICAL MEDICINE & REHABILITATION

## 2019-12-18 PROCEDURE — 99203 OFFICE O/P NEW LOW 30 MIN: CPT | Performed by: PHYSICAL MEDICINE & REHABILITATION

## 2019-12-18 ASSESSMENT — PAIN SCALES - GENERAL: PAINLEVEL: MODERATE PAIN (4)

## 2019-12-18 NOTE — PATIENT INSTRUCTIONS
1. I would recommend an epidural injection for your back pain. If you want to try this then call the number below and i'll place the order.    2. Continue your current physical therapy and exercises for your right low back pain.    3. If you want me to order a chiropractic/acupuncture referral, call the number below and i'll place the order.        ----------------------------------------------------------------  Clinic Number:  [phone]     Call with any questions about your care and for scheduling assistance.     Calls are returned Monday through Friday between 8 AM and 4:30 PM. We usually get back to you within 2 business days depending on the issue/request.    If we are prescribing your medications:    For opioid medication refills, call the clinic or send a [employer] message 7 days in advance.  Please include:    Name of requested medication    Name of the pharmacy.    For non-opioid medications, call your pharmacy directly to request a refill. Please allow 3-4 days to be processed.     Per [place] State Law:    All controlled substance prescriptions must be filled within 30 days of being written.      For those controlled substances allowing refills, pickup must occur within 30 days of last fill.      We believe regular attendance is key to your success in our program!      Any time you are unable to keep your appointment we ask that you call us at least 24 hours in advance to cancel.This will allow us to offer the appointment time to another patient.     Multiple missed appointments may lead to dismissal from the clinic.    ----------------------------------------------------------------  Clinic Number:  [phone]     Call with any questions about your care and for scheduling assistance.     Calls are returned Monday through Friday between 8 AM and 4:30 PM. We usually get back to you within 2 business days depending on the issue/request.    If we are prescribing your medications:    For opioid medication  refills, call the clinic or send a [employer] message 7 days in advance.  Please include:    Name of requested medication    Name of the pharmacy.    For non-opioid medications, call your pharmacy directly to request a refill. Please allow 3-4 days to be processed.     Per [place] State Law:    All controlled substance prescriptions must be filled within 30 days of being written.      For those controlled substances allowing refills, pickup must occur within 30 days of last fill.      We believe regular attendance is key to your success in our program!      Any time you are unable to keep your appointment we ask that you call us at least 24 hours in advance to cancel.This will allow us to offer the appointment time to another patient.     Multiple missed appointments may lead to dismissal from the clinic.
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
## 2019-12-18 NOTE — Clinical Note
She will call back if she is interested in trying chiropractic and an epidural. Symptoms appear to be due to sacroiliac joint dysfunction exacerbated by a leg length discrepancy. Thanks for the referral,[facility]

## 2020-01-29 ENCOUNTER — OFFICE VISIT (OUTPATIENT)
Dept: ORTHOPEDICS | Facility: CLINIC | Age: 80
End: 2020-01-29
Payer: COMMERCIAL

## 2020-01-29 ENCOUNTER — ANCILLARY PROCEDURE (OUTPATIENT)
Dept: GENERAL RADIOLOGY | Facility: CLINIC | Age: 80
End: 2020-01-29
Attending: FAMILY MEDICINE
Payer: COMMERCIAL

## 2020-01-29 VITALS — WEIGHT: 150 LBS | HEIGHT: 64 IN | BODY MASS INDEX: 25.61 KG/M2

## 2020-01-29 DIAGNOSIS — M25.511 ACUTE PAIN OF RIGHT SHOULDER: ICD-10-CM

## 2020-01-29 DIAGNOSIS — M25.511 ACUTE PAIN OF RIGHT SHOULDER: Primary | ICD-10-CM

## 2020-01-29 LAB — RADIOLOGIST FLAGS: NORMAL

## 2020-01-29 ASSESSMENT — MIFFLIN-ST. JEOR: SCORE: 1135.4

## 2020-01-29 NOTE — PATIENT INSTRUCTIONS
Rotator Cuff Injury     WHAT IS A ROTATOR CUFF INJURY?    A rotator cuff injury is irritation of or damage to the group of tendons and muscles that hold your shoulder joint together. Tendons are strong bands of tissue that attach muscle to bone. You use the muscles and tendons in your shoulder joint to move your shoulder and raise your arm over your head.        WHAT IS THE CAUSE?    A rotator cuff injury can be caused by:    Overuse of your shoulder in a sport or work activity that involves repetitive overhead movement of your shoulder, like swimming, baseball (mainly pitching), football, tennis, painting, plastering, or housework.  A sudden activity that twists your shoulder or tears your tendon, such as using your arm to break a fall, falling onto your arm, or lifting a heavy object  You may be at higher risk for a rotator cuff injury if you have poor head and shoulder posture, especially if you are older.    WHAT ARE THE SYMPTOMS?    Symptoms may include:    Pain and weakness in your arm and shoulder  Loss of shoulder movement, especially when you try to raise your arm overhead    HOW IS IT DIAGNOSED?    Your healthcare provider will ask about your symptoms, activities, and medical history and examine you. You may have X-rays or other scans or procedures, such as:    An ultrasound, which uses sound waves to show pictures of your shoulder joint  An MRI, which uses a strong magnetic field and radio waves to show detailed pictures of your shoulder joint  An arthrogram, which is an X-ray or MRI taken after a dye is injected into your joint to outline its shape  Arthroscopy, which is a type of surgery done with a small scope inserted into your joint so your provider can look directly at your joint    HOW IS IT TREATED?    You will need to change or stop doing the activities that cause pain until your injury has healed. For example, avoid strenuous activity or any overhead motion that causes pain. Also, try to make  sure that you are practicing good posture and are not slouching forward.    Your healthcare provider may recommend stretching and strengthening exercises to help you heal.    If you have a bad tear, you may need to have it repaired with surgery. After surgery, your treatment plan will include physical therapy to strengthen your shoulder as it heals.    The pain often gets better within a few weeks with self-care, but some injuries may take several months or longer to heal. It s important to follow all of your healthcare provider s instructions.    HOW CAN I TAKE CARE OF MYSELF?    To keep swelling down and help relieve pain:    Put an ice pack, gel pack, or package of frozen vegetables wrapped in a cloth on the area every 3 to 4 hours for up to 20 minutes at a time.  Take nonprescription pain medicine, such as acetaminophen, ibuprofen, or naproxen. Nonsteroidal anti-inflammatory medicines (NSAIDs), such as ibuprofen and naproxen, may cause stomach bleeding and other problems. These risks increase with age. Read the label and take as directed. Unless recommended by your healthcare provider, you should not take this medicine for more than 10 days.  Moist heat may help relieve pain, relax your muscles, and make it easier to move your arm and shoulder. Put moist heat on the injured area for 10 to 15 minutes before you do warm-up and stretching exercises. Moist heat includes heat patches or moist heating pads that you can buy at most drugstores, a warm wet washcloth, or a hot shower. To prevent burns to your skin, follow directions on the package and do not lie on any type of hot pad. Don t use heat if you have swelling.    Follow your healthcare provider's instructions, including any exercises recommended by your provider. Ask your provider:    How and when you will hear your test results  How long it will take to recover  What activities you should avoid, including how much you can lift, and when you can return to your  normal activities  How to take care of yourself at home  What symptoms or problems you should watch for and what to do if you have them  Make sure you know when you should come back for a checkup.    HOW CAN I HELP PREVENT A ROTATOR CUFF INJURY?    Warm-up exercises and stretching before activities can help prevent injuries. For example, do exercises that strengthen your shoulder muscles. If your arm or shoulder hurts after exercise, putting ice on it may help keep it from getting injured.    Follow safety rules and use any protective equipment recommended for your work or sport.    Avoid activities that cause pain. For example, avoid lifting heavy objects over your head.    Developed by [employer].  Published by [employer].  Copyright  2014 [employer] and/or one of its subsidiaries. All rights reserved.    Rotator Cuff Exercises    Isometric shoulder external rotation:  a doorway with your elbow bent 90 degrees and the back of the wrist on your injured side pressed against the door frame. Try to press your hand outward into the door frame. Hold for 5 seconds. Do 2 sets of 15.    Isometric shoulder internal rotation:  a doorway with your elbow bent 90 degrees and the front of the wrist on your injured side pressed against the door frame. Try to press your palm into the door frame. Hold for 5 seconds. Do 2 sets of 15.  Wand exercise, flexion: Stand upright and hold a stick in both hands, palms down. Stretch your arms by lifting them over your head, keeping your arms straight. Hold for 5 seconds and return to the starting position. Repeat 10 times.    Wand exercise, extension: Stand upright and hold a stick in both hands behind your back. Move the stick away from your back. Hold this position for 5 seconds. Relax and return to the starting position. Repeat 10 times.  Wand exercise, external rotation: Lie on your back and hold a stick in both hands, palms up. Your upper arms should be  resting on the floor with your elbows at your sides and bent 90 degrees. Use your uninjured arm to push your injured arm out away from your body. Keep the elbow of your injured arm at your side while it is being pushed. Hold the stretch for 5 seconds. Repeat 10 times.    Wand exercise, shoulder abduction and adduction: Stand and hold a stick with both hands, palms facing away from your body. Rest the stick against the front of your thighs. Use your uninjured arm to push your injured arm out to the side and up as high as possible. Keep your arms straight. Hold for 5 seconds. Repeat 10 times.    Resisted shoulder external rotation: Stand sideways next to a door with your injured arm farther from the door. Tie a knot in the end of the tubing and shut the knot in the door at waist level (or use cable weight machine at gym). Hold the other end of the tubing with the hand of your injured arm. Rest the hand of your injured arm across your stomach. Keeping your elbow in at your side, rotate your arm outward and away from your waist. Slowly return your arm to the starting position. Make sure you keep your elbow bent 90 degrees and your forearm parallel to the floor. Repeat 10 times. Build up to 2 sets of 15.    Resisted shoulder internal rotation: Stand sideways next to a door with your injured arm closest to the door. Tie a knot in the end of the tubing and shut the knot in the door at waist level (or use cable weight machine at gym). Hold the other end of the tubing with the hand of your injured arm. Bend the elbow of your injured arm 90 degrees. Keeping your elbow in at your side, rotate your forearm across your body and then slowly back to the starting position. Make sure you keep your forearm parallel to the floor. Do 2 sets of 8 to 12.    Scaption: Stand with your arms at your sides and with your elbows straight. Slowly raise your arms to eye level. As you raise your arms, spread them apart so that they are only  "slightly in front of your body (at about a 30-degree angle to the front of your body). Point your thumbs toward the ceiling. Hold for 2 seconds and lower your arms slowly. Do 2 sets of 15. Progress to holding a soup can or light weight when you are doing the exercise and increase the weight as the exercise gets easier.    Side-lying external rotation: Lie on your uninjured side with your injured arm at your side and your elbow bent 90 degrees. Keeping your elbow against your side, raise your forearm toward the ceiling and hold for 2 seconds. Slowly lower your arm. Do 2 sets of 15. You can start doing this exercise holding a soup can or light weight and gradually increase the weight as long as there is no pain.    Horizontal abduction: Lie on your stomach on a table or the edge of a bed with the arm on your injured side hanging down over the edge. Raise your arm out to the side, with your thumb pointed toward the ceiling, until your arm is parallel to the floor. Hold for 2 seconds and then lower it slowly. Start this exercise with no weight. As you get stronger, add a light weight or hold a soup can. Do 2 sets of 15.    Push-up with a plus: Begin on the floor on your hands and knees. Keep your hands a shoulder width apart and lift your feet off the floor. Arch your back as high as possible and round your shoulders (this is the \"plus\" part or the exercise). Bend your elbows and lower your body to the floor. Return to the starting position and arch your back again. Do 2 sets of 15.          "

## 2020-01-29 NOTE — LETTER
1/29/2020       RE: Lawrence Pemberton  [address] [place] [address]     Dear Colleague,    Thank you for referring your patient, Lawrence Pemberton, to the [facility] SPORTS AND ORTHOPAEDIC WALK IN CLINIC at [facility]. Please see a copy of my visit note below.          SPORTS & ORTHOPEDIC WALK-IN VISIT 1/29/2020    Primary Care Physician: Dr. Delong    1/28/20 - fell while [employer] skiing. Describes MAURY as her arm in extension and fell on top of her R shoulder. No prior R shoulder injuries.    When asked where pain is, she gestures to the lateral aspect of her R humerus. Does not specifically point to the RC insertion space, but does have increased pain with ACTIVE shoulder flexion vs PASSIVE shoulder flexion.    Slightly having trouble sleeping.    Reason for visit:     What part of your body is injured / painful?  right shoulder    What caused the injury /pain? Fall    How long ago did your injury occur or pain begin? yesterday    What are your most bothersome symptoms? Pain    How would you characterize your symptom?  aching and stabbing    What makes your symptoms better? Rest and Ice    What makes your symptoms worse? Movement    Have you been previously seen for this problem? No    Medical History:    Any recent changes to your medical history? No    Any new medication prescribed since last visit? No    Have you had surgery on this body part before? No    Social History:    Occupation: retired    Handedness: Right    Exercise: X[employer] skiing, biking, hiking    Review of Systems:    Do you have fever, chills, weight loss? No    Do you have any vision problems? No    Do you have any chest pain or edema? No    Do you have any shortness of breath or wheezing?  No    Do you have stomach problems? No    Do you have any numbness or focal weakness? No    Do you have diabetes? No    Do you have problems with bleeding or clotting? No    Do you have an rashes or other skin lesions?  [facility]  Orthopedics  Eleno Mae,   2020     Name: Lawrence Pemberton  MRN: [id number]  Age: 79 year old  : 1940  Referring provider: Referred Self     Chief Complaint: Right shoulder pain     Date of Injury: 2020    History of Present Illness:   Lawrence Pemberton is a 79 year old female who presents today with her  for evaluation of right shoulder pain. The patient reports that yesterday while cross country skiing, she lost her balance and fell, landing with the right arm extended behind her. Since then she has been having pain in the right shoulder with difficulty lifting the right arm. It is more comfortable to allow the arm to hang down. Prior to this injury she's had no issues with this shoulder. She endorses history of osteoporosis. No bruising or swelling of the shoulder. She has used ice and tylenol.    Review of Systems:   A 10-point review of systems was obtained and is negative except for as noted in the HPI.     Medications:      alendronate (FOSAMAX) 35 MG tablet, TAKE ONE TABLET BY MOUTH ONCE WEEKLY, TAKE WITH WATER 30 MINUTES BEFORE FOOD, DRINK & MEDS. STAY UPRIGHT FOR 30 MINUTES, Disp: 12 tablet, Rfl: 2     atorvastatin (LIPITOR) 20 MG tablet, Take 1 tablet (20 mg) by mouth daily, Disp: 90 tablet, Rfl: 3     diclofenac (VOLTAREN) 1 % GEL topical gel, Apply 4 grams to knees or 2 grams to hands four times daily using enclosed dosing card., Disp: 100 g, Rfl: 1     escitalopram (LEXAPRO) 5 MG tablet, Take 1 tablet (5 mg) by mouth daily, Disp: 90 tablet, Rfl: 1     Allergies:  No known drug allergies      Past Medical History:  Anxiety  Basal cell carcinoma (right side of nose)  ESBL producing bacteria infection  Major depression   Osteoarthritis   Osteopenia   Lumbar disc disorder      Past Surgical History:  Jaw ORIF -   Lumbar discectomy -   Injection SI joint, right (Dr. Hall) - 2019     Social History:  Patient is a nonsmoker. No smokeless  "tobacco use.  Alcohol Use: Yes, occasional   Drug Use: No       Family History:  Cancer, MI, fibromyalgia      Physical Examination:  Height 1.626 m (5' 4\"), weight 68 kg (150 lb), not currently breastfeeding.  General  - normal appearance, in no obvious distress  HEENT  -Pupils equal, round, no conjunctival injection.  No lid lag  CV  - normal radial pulse  Pulm  - normal respiratory pattern, non-labored  Musculoskeletal - Right shoulder  - inspection: normal bone and joint alignment, no obvious deformity, no scapular winging, no AC step-off  - palpation: Nontender at AC joint. No tenderness at bicipital groove or at the greater tuberosity.   - ROM: pain with FE greater than 45 deg.    ER to 40 deg with pain.     IR to T10 on right vs T6 on the left.   - strength: 4/5   - special tests:  (+) Neer  (+) Hawkin's  (+) Satnam positive for pain, strength 4-/5  Neuro  - no sensory or motor deficit, grossly normal coordination, normal muscle tone  Skin  - no ecchymosis, erythema, warmth, or induration, no obvious rash  Psych  - interactive, appropriate, normal mood and affect     Imaging:   Right Shoulder. Final results and radiologist's interpretation, available in the [facility] health record. Images were reviewed with the patient/family members in the office today. My personal interpretation of the performed imaging is no evidence of displaced fracture.     Assessment:   79 year old female presenting today with a hyperextension injury of the right shoulder, consistent with a rotator cuff strain.    Plan:   -HEP given. Start with codman's exercises today and progress to active assisted ROM as discussed on handouts.  -Ice therapy  -Ibuprofen dosing discussed, no GI intolerance or anticoagulation  -Follow up 2-3 weeks    Eleno TRAN DO, have reviewed the above note and agree with the scribe's notation as written.    Scribe Disclosure:  I, Vasiliy Farris, am serving as a scribe to document services personally performed by Eleno" RAEGAN Mae DO at this visit, based upon the provider's statements to me. All documentation has been reviewed by the aforementioned provider prior to being entered into the official medical record.      Eleno Mae DO

## 2020-01-29 NOTE — PROGRESS NOTES
[facility]  Orthopedics  Eleno Mae,   2020     Name: Lawrence Pemberton  MRN: [id number]  Age: 79 year old  : 1940  Referring provider: Referred Self     Chief Complaint: Right shoulder pain     Date of Injury: 2020    History of Present Illness:   Lawrence Pemberton is a 79 year old female who presents today with her  for evaluation of right shoulder pain. The patient reports that yesterday while cross country skiing, she lost her balance and fell, landing with the right arm extended behind her. Since then she has been having pain in the right shoulder with difficulty lifting the right arm. It is more comfortable to allow the arm to hang down. Prior to this injury she's had no issues with this shoulder. She endorses history of osteoporosis. No bruising or swelling of the shoulder. She has used ice and tylenol.    Review of Systems:   A 10-point review of systems was obtained and is negative except for as noted in the HPI.     Medications:      alendronate (FOSAMAX) 35 MG tablet, TAKE ONE TABLET BY MOUTH ONCE WEEKLY, TAKE WITH WATER 30 MINUTES BEFORE FOOD, DRINK & MEDS. STAY UPRIGHT FOR 30 MINUTES, Disp: 12 tablet, Rfl: 2     atorvastatin (LIPITOR) 20 MG tablet, Take 1 tablet (20 mg) by mouth daily, Disp: 90 tablet, Rfl: 3     diclofenac (VOLTAREN) 1 % GEL topical gel, Apply 4 grams to knees or 2 grams to hands four times daily using enclosed dosing card., Disp: 100 g, Rfl: 1     escitalopram (LEXAPRO) 5 MG tablet, Take 1 tablet (5 mg) by mouth daily, Disp: 90 tablet, Rfl: 1     Allergies:  No known drug allergies      Past Medical History:  Anxiety  Basal cell carcinoma (right side of nose)  ESBL producing bacteria infection  Major depression   Osteoarthritis   Osteopenia   Lumbar disc disorder      Past Surgical History:  Jaw ORIF -   Lumbar discectomy -   Injection SI joint, right (Dr. Hall) - 2019     Social History:  Patient is a nonsmoker. No smokeless tobacco  "use.  Alcohol Use: Yes, occasional   Drug Use: No       Family History:  Cancer, MI, fibromyalgia      Physical Examination:  Height 1.626 m (5' 4\"), weight 68 kg (150 lb), not currently breastfeeding.  General  - normal appearance, in no obvious distress  HEENT  -Pupils equal, round, no conjunctival injection.  No lid lag  CV  - normal radial pulse  Pulm  - normal respiratory pattern, non-labored  Musculoskeletal - Right shoulder  - inspection: normal bone and joint alignment, no obvious deformity, no scapular winging, no AC step-off  - palpation: Nontender at AC joint. No tenderness at bicipital groove or at the greater tuberosity.   - ROM: pain with FE greater than 45 deg.    ER to 40 deg with pain.     IR to T10 on right vs T6 on the left.   - strength: 4/5   - special tests:  (+) Neer  (+) Hawkin's  (+) Satnam positive for pain, strength 4-/5  Neuro  - no sensory or motor deficit, grossly normal coordination, normal muscle tone  Skin  - no ecchymosis, erythema, warmth, or induration, no obvious rash  Psych  - interactive, appropriate, normal mood and affect     Imaging:   Right Shoulder. Final results and radiologist's interpretation, available in the [facility] health record. Images were reviewed with the patient/family members in the office today. My personal interpretation of the performed imaging is no evidence of displaced fracture.     Assessment:   79 year old female presenting today with a hyperextension injury of the right shoulder, consistent with a rotator cuff strain.    Plan:   -HEP given. Start with codman's exercises today and progress to active assisted ROM as discussed on handouts.  -Ice therapy  -Ibuprofen dosing discussed, no GI intolerance or anticoagulation  -Follow up 2-3 weeks    IEleno, DO, have reviewed the above note and agree with the scribe's notation as written.    Scribe Disclosure:  I, Vasiliy Farris, am serving as a scribe to document services personally performed by Eleno Mae, " DO at this visit, based upon the provider's statements to me. All documentation has been reviewed by the aforementioned provider prior to being entered into the official medical record.

## 2020-01-29 NOTE — PROGRESS NOTES
SPORTS & ORTHOPEDIC WALK-IN VISIT 1/29/2020    Primary Care Physician: Dr. Delong    1/28/20 - fell while [employer] skiing. Describes MAURY as her arm in extension and fell on top of her R shoulder. No prior R shoulder injuries.    When asked where pain is, she gestures to the lateral aspect of her R humerus. Does not specifically point to the RC insertion space, but does have increased pain with ACTIVE shoulder flexion vs PASSIVE shoulder flexion.    Slightly having trouble sleeping.    Reason for visit:     What part of your body is injured / painful?  right shoulder    What caused the injury /pain? Fall    How long ago did your injury occur or pain begin? yesterday    What are your most bothersome symptoms? Pain    How would you characterize your symptom?  aching and stabbing    What makes your symptoms better? Rest and Ice    What makes your symptoms worse? Movement    Have you been previously seen for this problem? No    Medical History:    Any recent changes to your medical history? No    Any new medication prescribed since last visit? No    Have you had surgery on this body part before? No    Social History:    Occupation: retired    Handedness: Right    Exercise: XC skiing, biking, hiking    Review of Systems:    Do you have fever, chills, weight loss? No    Do you have any vision problems? No    Do you have any chest pain or edema? No    Do you have any shortness of breath or wheezing?  No    Do you have stomach problems? No    Do you have any numbness or focal weakness? No    Do you have diabetes? No    Do you have problems with bleeding or clotting? No    Do you have an rashes or other skin lesions? No

## 2020-01-30 ENCOUNTER — TELEPHONE (OUTPATIENT)
Dept: ORTHOPEDICS | Facility: CLINIC | Age: 80
End: 2020-01-30

## 2020-02-10 NOTE — RESULT ENCOUNTER NOTE
Reviewed Dexa results- stable hips, slight worsening of lumbar spine on fosamax.  On fosamax since 2016.  Still in osteopenia range.  Will cont fosamax for now.  CW

## 2020-03-03 ENCOUNTER — OFFICE VISIT (OUTPATIENT)
Dept: FAMILY MEDICINE | Facility: CLINIC | Age: 80
End: 2020-03-03
Payer: COMMERCIAL

## 2020-03-03 VITALS
OXYGEN SATURATION: 99 % | DIASTOLIC BLOOD PRESSURE: 77 MMHG | RESPIRATION RATE: 14 BRPM | HEART RATE: 85 BPM | BODY MASS INDEX: 27.19 KG/M2 | TEMPERATURE: 97.5 F | WEIGHT: 159.25 LBS | SYSTOLIC BLOOD PRESSURE: 145 MMHG | HEIGHT: 64 IN

## 2020-03-03 DIAGNOSIS — M46.1 SACROILIITIS (H): ICD-10-CM

## 2020-03-03 DIAGNOSIS — R91.8 OPACITY OF LUNG ON IMAGING STUDY: Primary | ICD-10-CM

## 2020-03-03 DIAGNOSIS — F33.1 MAJOR DEPRESSIVE DISORDER, RECURRENT, MODERATE (H): ICD-10-CM

## 2020-03-03 PROCEDURE — 99214 OFFICE O/P EST MOD 30 MIN: CPT | Performed by: FAMILY MEDICINE

## 2020-03-03 ASSESSMENT — MIFFLIN-ST. JEOR: SCORE: 1177.35

## 2020-03-03 NOTE — NURSING NOTE
"Chief Complaint   Patient presents with     X-ray Results     BP (!) 145/77 (BP Location: Left arm, Patient Position: Left side, Cuff Size: Adult Regular)   Pulse 85   Temp 97.5  F (36.4  C) (Oral)   Resp 14   Ht 1.626 m (5' 4\")   Wt 72.2 kg (159 lb 4 oz)   LMP  (Approximate)   SpO2 99%   Breastfeeding No   BMI 27.34 kg/m   Estimated body mass index is 27.34 kg/m  as calculated from the following:    Height as of this encounter: 1.626 m (5' 4\").    Weight as of this encounter: 72.2 kg (159 lb 4 oz).  bp completed using cuff size: regular      Health Maintenance addressed:  NONE    N/a  Chrissie Kirby CMA on 3/3/2020 at 2:07 PM                "
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
## 2020-03-06 ENCOUNTER — ANCILLARY PROCEDURE (OUTPATIENT)
Dept: CT IMAGING | Facility: CLINIC | Age: 80
End: 2020-03-06
Attending: FAMILY MEDICINE
Payer: COMMERCIAL

## 2020-03-06 DIAGNOSIS — F33.1 MAJOR DEPRESSIVE DISORDER, RECURRENT, MODERATE (H): ICD-10-CM

## 2020-03-06 DIAGNOSIS — F41.1 ANXIETY STATE: ICD-10-CM

## 2020-03-06 DIAGNOSIS — R91.8 OPACITY OF LUNG ON IMAGING STUDY: ICD-10-CM

## 2020-03-06 RX ORDER — ESCITALOPRAM OXALATE 5 MG/1
TABLET ORAL
Start: 2020-03-06

## 2020-03-06 NOTE — TELEPHONE ENCOUNTER
"Requested Prescriptions   Pending Prescriptions Disp Refills     escitalopram (LEXAPRO) 5 MG tablet [Pharmacy Med Name: Escitalopram Oxalate Oral Tablet 5 MG] 90 tablet 0     Sig: TAKE ONE TABLET BY MOUTH ONCE DAILY  Last Written Prescription Date:  11/19/19  Last Fill Quantity: 90 tab,  # refills: 1   Last office visit: 3/3/2020 with prescribing provider:  Baljeet   Future Office Visit:         SSRIs Protocol Passed - 3/6/2020 10:03 AM        Passed - PHQ-9 score less than 5 in past 6 months     Please review last PHQ-9 score.   PHQ-9 SCORE 11/16/2018 6/18/2019 11/19/2019   PHQ-9 Total Score - - -   PHQ-9 Total Score MyChart 3 (Minimal depression) - -   PHQ-9 Total Score 3 2 1     SHARMAINE-7 SCORE 11/16/2018 8/12/2019 11/19/2019   Total Score - 2 (minimal anxiety) -   Total Score 5 2 7           Passed - Medication is active on med list        Passed - Patient is age 18 or older        Passed - No active pregnancy on record        Passed - No positive pregnancy test in last 12 months        Passed - Recent (6 mo) or future (30 days) visit within the authorizing provider's specialty     Patient had office visit in the last 6 months or has a visit in the next 30 days with authorizing provider or within the authorizing provider's specialty.  See \"Patient Info\" tab in inbasket, or \"Choose Columns\" in Meds & Orders section of the refill encounter.             "

## 2020-03-11 ENCOUNTER — TELEPHONE (OUTPATIENT)
Dept: FAMILY MEDICINE | Facility: CLINIC | Age: 80
End: 2020-03-11

## 2020-03-11 DIAGNOSIS — R91.8 PULMONARY NODULES: ICD-10-CM

## 2020-03-11 NOTE — TELEPHONE ENCOUNTER
Reason for Call:  Request for results:    Name of test or procedure: CT Chest W/O Contrast    Date of test of procedure: 3/6/20    [facility]    OK to leave the result message on voice mail or with a family member? YES    Phone number Patient can be reached at:  Cell number on file:    Telephone Information:   Mobile [phone]       Additional comments: Solveig calling in to know what the results are and she also has some travel questions since they are going to [place].     Please call her.    Call taken on 3/11/2020 at 1:19 PM by Cam Cason

## 2020-03-11 NOTE — TELEPHONE ENCOUNTER
Called and discussed results.  Likely granulomatous disease, rec f/u CT scan in 3-6 months.  Also saw 9mm hypodense lesion in lobe of liver, rec f/u CT scan of abd.  Discussed we can likely do these at the same time.  She's returning in 5/20 for her next appt- will place orders at that time.  Pt agrees with plan.  CW

## 2020-03-11 NOTE — RESULT ENCOUNTER NOTE
Discussed CT scan results with pt- see TE from today.  Will plan for f/u CT lung/abd scans in ~7/20- will plan to order them at her 5/20 f/u visit.  CW

## 2020-05-12 ENCOUNTER — TELEPHONE (OUTPATIENT)
Dept: FAMILY MEDICINE | Facility: CLINIC | Age: 80
End: 2020-05-12

## 2020-05-12 NOTE — TELEPHONE ENCOUNTER
Left message for patient to callback.    Huddled with CW  Pt on SN's schedule for tomorrow  Does she want to do visit with CW instead or does she have travel related questions?     If she does, then put spot on SN's schedule on hold for same day  Margaret LEE RN

## 2020-05-13 ENCOUNTER — VIRTUAL VISIT (OUTPATIENT)
Dept: FAMILY MEDICINE | Facility: CLINIC | Age: 80
End: 2020-05-13
Payer: COMMERCIAL

## 2020-05-13 DIAGNOSIS — R91.8 PULMONARY NODULES: ICD-10-CM

## 2020-05-13 DIAGNOSIS — M54.42 LEFT-SIDED LOW BACK PAIN WITH LEFT-SIDED SCIATICA, UNSPECIFIED CHRONICITY: Primary | ICD-10-CM

## 2020-05-13 DIAGNOSIS — F33.1 MAJOR DEPRESSIVE DISORDER, RECURRENT, MODERATE (H): ICD-10-CM

## 2020-05-13 DIAGNOSIS — K76.9 LIVER LESION: ICD-10-CM

## 2020-05-13 DIAGNOSIS — F41.1 ANXIETY STATE: ICD-10-CM

## 2020-05-13 PROCEDURE — 96127 BRIEF EMOTIONAL/BEHAV ASSMT: CPT | Mod: 95 | Performed by: FAMILY MEDICINE

## 2020-05-13 PROCEDURE — 99214 OFFICE O/P EST MOD 30 MIN: CPT | Mod: 95 | Performed by: FAMILY MEDICINE

## 2020-05-13 RX ORDER — ESCITALOPRAM OXALATE 5 MG/1
5 TABLET ORAL DAILY
Qty: 90 TABLET | Refills: 1 | Status: SHIPPED | OUTPATIENT
Start: 2020-05-13 | End: 2020-12-18

## 2020-05-13 ASSESSMENT — ANXIETY QUESTIONNAIRES
1. FEELING NERVOUS, ANXIOUS, OR ON EDGE: SEVERAL DAYS
2. NOT BEING ABLE TO STOP OR CONTROL WORRYING: SEVERAL DAYS
5. BEING SO RESTLESS THAT IT IS HARD TO SIT STILL: NOT AT ALL
IF YOU CHECKED OFF ANY PROBLEMS ON THIS QUESTIONNAIRE, HOW DIFFICULT HAVE THESE PROBLEMS MADE IT FOR YOU TO DO YOUR WORK, TAKE CARE OF THINGS AT HOME, OR GET ALONG WITH OTHER PEOPLE: NOT DIFFICULT AT ALL
6. BECOMING EASILY ANNOYED OR IRRITABLE: SEVERAL DAYS
7. FEELING AFRAID AS IF SOMETHING AWFUL MIGHT HAPPEN: SEVERAL DAYS
3. WORRYING TOO MUCH ABOUT DIFFERENT THINGS: SEVERAL DAYS
GAD7 TOTAL SCORE: 5

## 2020-05-13 ASSESSMENT — PATIENT HEALTH QUESTIONNAIRE - PHQ9
SUM OF ALL RESPONSES TO PHQ QUESTIONS 1-9: 1
5. POOR APPETITE OR OVEREATING: NOT AT ALL
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
## 2020-05-13 NOTE — TELEPHONE ENCOUNTER
Haven't heard back from patient   CW asked that pt be moved to her schedule  Rescheduled  Margaret LEE RN

## 2020-05-14 ASSESSMENT — ANXIETY QUESTIONNAIRES: GAD7 TOTAL SCORE: 5

## 2020-06-05 ENCOUNTER — ANCILLARY PROCEDURE (OUTPATIENT)
Dept: CT IMAGING | Facility: CLINIC | Age: 80
End: 2020-06-05
Attending: FAMILY MEDICINE
Payer: COMMERCIAL

## 2020-06-05 DIAGNOSIS — K76.9 LIVER LESION: ICD-10-CM

## 2020-06-05 RX ORDER — IOPAMIDOL 755 MG/ML
97 INJECTION, SOLUTION INTRAVASCULAR ONCE
Status: COMPLETED | OUTPATIENT
Start: 2020-06-05 | End: 2020-06-05

## 2020-06-05 RX ADMIN — IOPAMIDOL 97 ML: 755 INJECTION, SOLUTION INTRAVASCULAR at 14:34

## 2020-06-15 NOTE — RESULT ENCOUNTER NOTE
Called radiology to get more information on the hilar lymph node area.   He compared to 3/20 scan, stated it was not noted in that report due to no contrast, but looking at it specifically, it looks stable from 3/20 to the 6/5/20 scan.  He thought it looks benign in shape/contour- looks benign, possible fungal.  No further f/u recommended for it.  No further f/u needed for the pulmonary or hepatic areas as well- also look benign.    Called pt to discuss results- reassuring, looks benign, no further f/u recommended.  CW

## 2020-08-24 DIAGNOSIS — M85.80 OSTEOPENIA, UNSPECIFIED LOCATION: ICD-10-CM

## 2020-08-25 RX ORDER — ALENDRONATE SODIUM 35 MG/1
TABLET ORAL
Qty: 12 TABLET | Refills: 3 | Status: SHIPPED | OUTPATIENT
Start: 2020-08-25 | End: 2021-08-09

## 2020-10-19 ENCOUNTER — TRANSFERRED RECORDS (OUTPATIENT)
Dept: HEALTH INFORMATION MANAGEMENT | Facility: CLINIC | Age: 80
End: 2020-10-19

## 2020-12-04 ENCOUNTER — MYC MEDICAL ADVICE (OUTPATIENT)
Dept: FAMILY MEDICINE | Facility: CLINIC | Age: 80
End: 2020-12-04

## 2020-12-04 ENCOUNTER — TELEPHONE (OUTPATIENT)
Dept: FAMILY MEDICINE | Facility: CLINIC | Age: 80
End: 2020-12-04

## 2020-12-04 NOTE — TELEPHONE ENCOUNTER
Patient Quality Outreach      Summary:    Patient has the following on her problem list/HM:   Depression / Dysthymia review    6 Month Remission: 4-8 month window range:   12 Month Remission: 10-14 month window range:        PHQ-9 SCORE 6/18/2019 11/19/2019 5/13/2020   PHQ-9 Total Score - - -   PHQ-9 Total Score MyChart - - -   PHQ-9 Total Score 2 1 1       If PHQ-9 recheck is 5 or more, route to provider for next steps.    Patient is due/failing the following:   Colonoscopy, PHQ-9 Needed and Annual wellness, date due: 11/19/2020    Type of outreach:    Sent MessageMe message.    Questions for provider review:    None                                                                                                                                     Kristin Doherty MA on 12/4/2020 at 2:50 PM       Chart routed to .

## 2020-12-07 ASSESSMENT — PATIENT HEALTH QUESTIONNAIRE - PHQ9
10. IF YOU CHECKED OFF ANY PROBLEMS, HOW DIFFICULT HAVE THESE PROBLEMS MADE IT FOR YOU TO DO YOUR WORK, TAKE CARE OF THINGS AT HOME, OR GET ALONG WITH OTHER PEOPLE: NOT DIFFICULT AT ALL
SUM OF ALL RESPONSES TO PHQ QUESTIONS 1-9: 0
SUM OF ALL RESPONSES TO PHQ QUESTIONS 1-9: 0

## 2020-12-08 ASSESSMENT — PATIENT HEALTH QUESTIONNAIRE - PHQ9: SUM OF ALL RESPONSES TO PHQ QUESTIONS 1-9: 0

## 2020-12-18 ENCOUNTER — OFFICE VISIT (OUTPATIENT)
Dept: FAMILY MEDICINE | Facility: CLINIC | Age: 80
End: 2020-12-18
Payer: COMMERCIAL

## 2020-12-18 VITALS
TEMPERATURE: 98.2 F | HEIGHT: 65 IN | WEIGHT: 151.8 LBS | BODY MASS INDEX: 25.29 KG/M2 | DIASTOLIC BLOOD PRESSURE: 79 MMHG | HEART RATE: 73 BPM | OXYGEN SATURATION: 96 % | SYSTOLIC BLOOD PRESSURE: 139 MMHG

## 2020-12-18 DIAGNOSIS — F33.1 MAJOR DEPRESSIVE DISORDER, RECURRENT, MODERATE (H): ICD-10-CM

## 2020-12-18 DIAGNOSIS — Z91.81 RISK FOR FALLS: ICD-10-CM

## 2020-12-18 DIAGNOSIS — F41.1 ANXIETY STATE: ICD-10-CM

## 2020-12-18 DIAGNOSIS — M85.80 OSTEOPENIA, UNSPECIFIED LOCATION: ICD-10-CM

## 2020-12-18 DIAGNOSIS — E78.5 HYPERLIPIDEMIA WITH TARGET LDL LESS THAN 130: ICD-10-CM

## 2020-12-18 DIAGNOSIS — R26.89 BALANCE PROBLEMS: ICD-10-CM

## 2020-12-18 DIAGNOSIS — Z00.00 ENCOUNTER FOR MEDICARE ANNUAL WELLNESS EXAM: Primary | ICD-10-CM

## 2020-12-18 LAB
ANION GAP SERPL CALCULATED.3IONS-SCNC: 5 MMOL/L (ref 3–14)
BUN SERPL-MCNC: 17 MG/DL (ref 7–30)
CALCIUM SERPL-MCNC: 8.8 MG/DL (ref 8.5–10.1)
CHLORIDE SERPL-SCNC: 106 MMOL/L (ref 94–109)
CHOLEST SERPL-MCNC: 155 MG/DL
CO2 SERPL-SCNC: 27 MMOL/L (ref 20–32)
CREAT SERPL-MCNC: 0.83 MG/DL (ref 0.52–1.04)
GFR SERPL CREATININE-BSD FRML MDRD: 66 ML/MIN/{1.73_M2}
GLUCOSE SERPL-MCNC: 83 MG/DL (ref 70–99)
HDLC SERPL-MCNC: 45 MG/DL
LDLC SERPL CALC-MCNC: 95 MG/DL
NONHDLC SERPL-MCNC: 110 MG/DL
POTASSIUM SERPL-SCNC: 4.2 MMOL/L (ref 3.4–5.3)
SODIUM SERPL-SCNC: 138 MMOL/L (ref 133–144)
TRIGL SERPL-MCNC: 74 MG/DL

## 2020-12-18 PROCEDURE — 99397 PER PM REEVAL EST PAT 65+ YR: CPT | Performed by: FAMILY MEDICINE

## 2020-12-18 PROCEDURE — 80061 LIPID PANEL: CPT | Performed by: FAMILY MEDICINE

## 2020-12-18 PROCEDURE — 80048 BASIC METABOLIC PNL TOTAL CA: CPT | Performed by: FAMILY MEDICINE

## 2020-12-18 PROCEDURE — 36415 COLL VENOUS BLD VENIPUNCTURE: CPT | Performed by: FAMILY MEDICINE

## 2020-12-18 PROCEDURE — 99213 OFFICE O/P EST LOW 20 MIN: CPT | Mod: 25 | Performed by: FAMILY MEDICINE

## 2020-12-18 RX ORDER — ATORVASTATIN CALCIUM 20 MG/1
20 TABLET, FILM COATED ORAL DAILY
Qty: 90 TABLET | Refills: 3 | Status: SHIPPED | OUTPATIENT
Start: 2020-12-18 | End: 2022-03-07

## 2020-12-18 RX ORDER — ESCITALOPRAM OXALATE 5 MG/1
5 TABLET ORAL DAILY
Qty: 90 TABLET | Refills: 1 | Status: SHIPPED | OUTPATIENT
Start: 2020-12-18 | End: 2021-06-29

## 2020-12-18 ASSESSMENT — ACTIVITIES OF DAILY LIVING (ADL): CURRENT_FUNCTION: NO ASSISTANCE NEEDED

## 2020-12-18 ASSESSMENT — MIFFLIN-ST. JEOR: SCORE: 1151.5

## 2020-12-18 NOTE — PATIENT INSTRUCTIONS
Patient Education   Personalized Prevention Plan  You are due for the preventive services outlined below.  Your care team is available to assist you in scheduling these services.  If you have already completed any of these items, please share that information with your care team to update in your medical record.  Health Maintenance Due   Topic Date Due     Zoster (Shingles) Vaccine (2 of 3) 03/11/2009     Colorectal Cancer Screening  09/02/2020     FALL RISK ASSESSMENT  11/19/2020     Annual Wellness Visit  11/19/2020
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
## 2020-12-24 NOTE — RESULT ENCOUNTER NOTE
Here are your lab results from your recent visit...  -Your cholesterol panel looks very good with a low LDL again (the bad cholesterol) and a good HDL (the good cholesterol).           -Your basic metabolic panel (which includes electrolyte levels, blood sugar level and kidney function tests) looks normal.          Please let me know if you have any questions.  Best,   Branden Delong MD

## 2021-01-06 DIAGNOSIS — Z00.00 ENCOUNTER FOR MEDICARE ANNUAL WELLNESS EXAM: ICD-10-CM

## 2021-01-06 PROCEDURE — 82274 ASSAY TEST FOR BLOOD FECAL: CPT | Performed by: FAMILY MEDICINE

## 2021-01-09 LAB — HEMOCCULT STL QL IA: NEGATIVE

## 2021-01-11 NOTE — RESULT ENCOUNTER NOTE
Here are your lab results from your recent visit...  -Your FIT test (the screening test for colon cancer by testing for blood in your stool), was negative.  We should continue to check this yearly (unless you have a colonoscopy instead).     Please let me know if you have any questions.  Branden Jones MD

## 2021-02-08 ENCOUNTER — IMMUNIZATION (OUTPATIENT)
Dept: PEDIATRICS | Facility: CLINIC | Age: 81
End: 2021-02-08
Payer: COMMERCIAL

## 2021-02-08 PROCEDURE — 91300 PR COVID VAC PFIZER DIL RECON 30 MCG/0.3 ML IM: CPT

## 2021-02-08 PROCEDURE — 0001A PR COVID VAC PFIZER DIL RECON 30 MCG/0.3 ML IM: CPT

## 2021-03-01 ENCOUNTER — IMMUNIZATION (OUTPATIENT)
Dept: PEDIATRICS | Facility: CLINIC | Age: 81
End: 2021-03-01
Attending: INTERNAL MEDICINE
Payer: COMMERCIAL

## 2021-03-01 PROCEDURE — 0002A PR COVID VAC PFIZER DIL RECON 30 MCG/0.3 ML IM: CPT

## 2021-03-01 PROCEDURE — 91300 PR COVID VAC PFIZER DIL RECON 30 MCG/0.3 ML IM: CPT

## 2021-03-29 ENCOUNTER — E-VISIT (OUTPATIENT)
Dept: FAMILY MEDICINE | Facility: CLINIC | Age: 81
End: 2021-03-29
Payer: COMMERCIAL

## 2021-03-29 DIAGNOSIS — R50.9 FEVER AND CHILLS: Primary | ICD-10-CM

## 2021-03-29 PROCEDURE — 99421 OL DIG E/M SVC 5-10 MIN: CPT | Performed by: FAMILY MEDICINE

## 2021-03-29 NOTE — TELEPHONE ENCOUNTER
Called pt to discuss sx's- fevers improved yesterday/today, but still weak and had chills last night.  Rec continued good hydration and rest.  Cont creams for skin, and can try hydrocortisone spot treatment on itchy spots.  Will order COVID test for odd fevers, even though very low risk due to minimal exposures and s/p COVID vaccination.  Too far out from vaccination (3 wks) to suspect reaction from vaccination.  If sx's not improving or worsening, rec in-clinic appt.  Pt agrees with plan- will await call for COVID testing.    Provider E-Visit time total (minutes): 9

## 2021-03-30 DIAGNOSIS — R50.9 FEVER AND CHILLS: ICD-10-CM

## 2021-03-30 LAB
LABORATORY COMMENT REPORT: NORMAL
SARS-COV-2 RNA RESP QL NAA+PROBE: NEGATIVE
SARS-COV-2 RNA RESP QL NAA+PROBE: NORMAL
SPECIMEN SOURCE: NORMAL
SPECIMEN SOURCE: NORMAL

## 2021-03-30 PROCEDURE — U0003 INFECTIOUS AGENT DETECTION BY NUCLEIC ACID (DNA OR RNA); SEVERE ACUTE RESPIRATORY SYNDROME CORONAVIRUS 2 (SARS-COV-2) (CORONAVIRUS DISEASE [COVID-19]), AMPLIFIED PROBE TECHNIQUE, MAKING USE OF HIGH THROUGHPUT TECHNOLOGIES AS DESCRIBED BY CMS-2020-01-R: HCPCS | Performed by: FAMILY MEDICINE

## 2021-03-30 PROCEDURE — U0005 INFEC AGEN DETEC AMPLI PROBE: HCPCS | Performed by: FAMILY MEDICINE

## 2021-03-31 NOTE — RESULT ENCOUNTER NOTE
-Good news- your COVID test was negative.  Please come in to be seen if you are not continuing to get better...    Best,   Branden Delong MD

## 2021-06-29 DIAGNOSIS — F33.1 MAJOR DEPRESSIVE DISORDER, RECURRENT, MODERATE (H): ICD-10-CM

## 2021-06-29 DIAGNOSIS — F41.1 ANXIETY STATE: ICD-10-CM

## 2021-06-29 RX ORDER — ESCITALOPRAM OXALATE 5 MG/1
TABLET ORAL
Qty: 90 TABLET | Refills: 0 | Status: SHIPPED | OUTPATIENT
Start: 2021-06-29 | End: 2021-08-10

## 2021-07-05 ENCOUNTER — TELEPHONE (OUTPATIENT)
Dept: FAMILY MEDICINE | Facility: CLINIC | Age: 81
End: 2021-07-05

## 2021-08-09 ASSESSMENT — ANXIETY QUESTIONNAIRES
8. IF YOU CHECKED OFF ANY PROBLEMS, HOW DIFFICULT HAVE THESE MADE IT FOR YOU TO DO YOUR WORK, TAKE CARE OF THINGS AT HOME, OR GET ALONG WITH OTHER PEOPLE?: NOT DIFFICULT AT ALL
GAD7 TOTAL SCORE: 3
1. FEELING NERVOUS, ANXIOUS, OR ON EDGE: SEVERAL DAYS
5. BEING SO RESTLESS THAT IT IS HARD TO SIT STILL: NOT AT ALL
3. WORRYING TOO MUCH ABOUT DIFFERENT THINGS: SEVERAL DAYS
7. FEELING AFRAID AS IF SOMETHING AWFUL MIGHT HAPPEN: SEVERAL DAYS
GAD7 TOTAL SCORE: 3
4. TROUBLE RELAXING: NOT AT ALL
7. FEELING AFRAID AS IF SOMETHING AWFUL MIGHT HAPPEN: SEVERAL DAYS
2. NOT BEING ABLE TO STOP OR CONTROL WORRYING: NOT AT ALL
GAD7 TOTAL SCORE: 3
6. BECOMING EASILY ANNOYED OR IRRITABLE: NOT AT ALL

## 2021-08-09 NOTE — PROGRESS NOTES
Assessment & Plan       ICD-10-CM    1. Major depressive disorder, recurrent, moderate (H)  F33.1 EMOTIONAL / BEHAVIORAL ASSESSMENT     escitalopram (LEXAPRO) 5 MG tablet   2. Anxiety state  F41.1 EMOTIONAL / BEHAVIORAL ASSESSMENT     escitalopram (LEXAPRO) 5 MG tablet   3. Mixed conductive and sensorineural hearing loss, unspecified laterality  H90.8 Otolaryngology Referral   4. Nasal congestion  R09.81 Otolaryngology Referral   5. Basal cell carcinoma of skin of nose  C44.311    6. Osteopenia, unspecified location  M85.80 alendronate (FOSAMAX) 35 MG tablet   7. Postural imbalance  R29.3       MDD/Anxiety- Sx's aren't great, but okay, and is content to stay at the 5mg lexapro dose.  Cont q6mo f/u, but rtc sooner if sx's worsen or would like medication change.    Hearing concerns/phlegm in nose/throat, some frustrations with her hearing aides- rec f/u with new ENT, Dr. Bower.  Referral given.    BCC- new BCC spot found on R nare, near where first only other BCC was dx/txt in 1993.  Has MOHS scheduled soon, and will cont close f/u with dermatology.    Osteopenia- tolerating the fosamax well, no se's.  Will cont.    Postural imbalance- pt has declined PT referral, but can call if she changes her mind and would like to see them.    Return in about 6 months (around 2/10/2022) for Physical Exam, Routine Visit/Chronic Cares/Med Check.    32 minutes spent on the date of the encounter doing chart review, review of outside records, review of test results, patient visit and documentation        Roberta Delong MD  [facility]      Gema Bravo is a 80 year old who presents for the following health issues - see below    HPI     Answers for HPI/ROS submitted by the patient on 8/9/2021  If you checked off any problems, how difficult have these problems made it for you to do your work, take care of things at home, or get along with other people?: Not difficult at all  PHQ9 TOTAL SCORE:  0  SHARMAINE 7 TOTAL SCORE: 3        Depression Followup    How are you doing with your depression since your last visit? Improved     Are you having other symptoms that might be associated with depression? No    Have you had a significant life event?  No     Are you feeling anxious or having panic attacks?   No    Do you have any concerns with your use of alcohol or other drugs? No    Feels like the lexapro 5mg/d is helpful.  Mood/anxiety is okay but still a bit up and down.  Likes being outside, gardening - that helps.  Being active helps.  Driving- can be a little anxious, doesn't like the faster highway speeds, so tries to take the slower streets.      Hearing is a bit of a problem.  Has had hearing aides for ~5 yrs, but they are not working great.  She had a hearing test over a year ago, and had the hearing aides adjusted.  Then got bombarded with promotions with hearing aide [employer], with expensive, high-tech options.  Her family has started to complain about her hearing, too.    She had some phlegm/mucous in the back of her nose.   Sometimes drains to her mouth.    Has seen ENT/audiology but would be interested in trying a new provider.  Seem by ENT in 10/20 for congestion.      R shoulder still hurts some, hurts if lifting above her shoulder.  Has some exercises she could be better about.  About a year from injury, went PT.      Referred to PT for mobility/balance concern last time, but she never went.  She ended up and getting back into easy/flat cross-country skiing, and that helped.  Does think she still has some balance issues, but still not sure if she wants to go to PT.  Now she's biking, but less with the heat.  Can bike fine, though getting on/off is a bit nerve-wracking.  Walking on the flat is the biggest issues- fine if she has a cart or poles.  Feels very stiff- all over her body.  Will let us know if she would like to go ahead with mobility PT.        BCC- new spot dx with BCC now.  Did go to see  "dermatology- [facility]- Dr. Negro.  BCC on R sil, scheduled for MOHS surgery in 9/7/21.  BCC also removed in 1993, same group.      Social History     Tobacco Use     Smoking status: Never Smoker     Smokeless tobacco: Never Used   Substance Use Topics     Alcohol use: Never     Alcohol/week: 0.0 standard drinks     Comment: on occ, glass wine/wk     Drug use: Never     PHQ 5/13/2020 12/7/2020 8/9/2021   PHQ-9 Total Score 1 0 0   Q9: Thoughts of better off dead/self-harm past 2 weeks Not at all Not at all Not at all     SHARMAINE-7 SCORE 11/19/2019 5/13/2020 8/9/2021   Total Score - - 3 (minimal anxiety)   Total Score 7 5 3       Review of Systems   Constitutional, HEENT, cardiovascular, pulmonary, gi and gu systems are negative, except as otherwise noted.        Objective    BP (!) 142/70   Pulse 95   Temp 98.5  F (36.9  C) (Tympanic)   Resp 16   Ht 1.62 m (5' 3.78\")   Wt 66.2 kg (146 lb)   SpO2 96%   BMI 25.23 kg/m    Body mass index is 25.23 kg/m .  Physical Exam   GENERAL APPEARANCE: healthy, alert and no distress     EYES: sclera clear, EOMI     RESP: lungs clear to auscultation - no rales, rhonchi or wheezes     CV: regular rates and rhythm, normal S1 S2, no S3 or S4 and no murmur, click or rub      Ext: warm, dry, no edema       Psych: full range affect, normal speech and grooming, judgement and insight intact           "

## 2021-08-10 ENCOUNTER — OFFICE VISIT (OUTPATIENT)
Dept: FAMILY MEDICINE | Facility: CLINIC | Age: 81
End: 2021-08-10
Payer: COMMERCIAL

## 2021-08-10 VITALS
HEIGHT: 64 IN | SYSTOLIC BLOOD PRESSURE: 142 MMHG | DIASTOLIC BLOOD PRESSURE: 70 MMHG | HEART RATE: 95 BPM | OXYGEN SATURATION: 96 % | BODY MASS INDEX: 24.92 KG/M2 | RESPIRATION RATE: 16 BRPM | TEMPERATURE: 98.5 F | WEIGHT: 146 LBS

## 2021-08-10 DIAGNOSIS — C44.311 BASAL CELL CARCINOMA OF SKIN OF NOSE: ICD-10-CM

## 2021-08-10 DIAGNOSIS — F41.1 ANXIETY STATE: ICD-10-CM

## 2021-08-10 DIAGNOSIS — M85.80 OSTEOPENIA, UNSPECIFIED LOCATION: ICD-10-CM

## 2021-08-10 DIAGNOSIS — R29.3 POSTURAL IMBALANCE: ICD-10-CM

## 2021-08-10 DIAGNOSIS — R09.81 NASAL CONGESTION: ICD-10-CM

## 2021-08-10 DIAGNOSIS — F33.1 MAJOR DEPRESSIVE DISORDER, RECURRENT, MODERATE (H): Primary | ICD-10-CM

## 2021-08-10 DIAGNOSIS — H90.8 MIXED CONDUCTIVE AND SENSORINEURAL HEARING LOSS, UNSPECIFIED LATERALITY: ICD-10-CM

## 2021-08-10 PROCEDURE — 99214 OFFICE O/P EST MOD 30 MIN: CPT | Performed by: FAMILY MEDICINE

## 2021-08-10 PROCEDURE — 96127 BRIEF EMOTIONAL/BEHAV ASSMT: CPT | Performed by: FAMILY MEDICINE

## 2021-08-10 RX ORDER — ESCITALOPRAM OXALATE 5 MG/1
5 TABLET ORAL DAILY
Qty: 90 TABLET | Refills: 1 | Status: SHIPPED | OUTPATIENT
Start: 2021-08-10 | End: 2022-04-27

## 2021-08-10 RX ORDER — ALENDRONATE SODIUM 35 MG/1
TABLET ORAL
Qty: 12 TABLET | Refills: 1 | Status: SHIPPED | OUTPATIENT
Start: 2021-08-10 | End: 2022-05-02

## 2021-08-10 ASSESSMENT — ANXIETY QUESTIONNAIRES: GAD7 TOTAL SCORE: 3

## 2021-08-10 ASSESSMENT — MIFFLIN-ST. JEOR: SCORE: 1108.76

## 2021-08-10 ASSESSMENT — PATIENT HEALTH QUESTIONNAIRE - PHQ9: SUM OF ALL RESPONSES TO PHQ QUESTIONS 1-9: 0

## 2021-10-19 ENCOUNTER — IMMUNIZATION (OUTPATIENT)
Dept: NURSING | Facility: CLINIC | Age: 81
End: 2021-10-19
Payer: COMMERCIAL

## 2021-10-19 PROCEDURE — 0004A PR COVID VAC PFIZER DIL RECON 30 MCG/0.3 ML IM: CPT

## 2021-10-19 PROCEDURE — 91300 PR COVID VAC PFIZER DIL RECON 30 MCG/0.3 ML IM: CPT

## 2021-10-24 ENCOUNTER — HEALTH MAINTENANCE LETTER (OUTPATIENT)
Age: 81
End: 2021-10-24

## 2022-01-26 ENCOUNTER — TELEPHONE (OUTPATIENT)
Dept: FAMILY MEDICINE | Facility: CLINIC | Age: 82
End: 2022-01-26
Payer: COMMERCIAL

## 2022-01-26 NOTE — TELEPHONE ENCOUNTER
Patient calling about oral problem   Saw dentist - they told pt she needs to see orthodontist   Pt not quite sure what's going on with her teeth, but had a cleaning and now gums swollen and needs to have root canal on a tooth   Dentist started her on Amoxicillin 500mg 4 times per day for 7 days - has taken 2 days so far   Followed up with dentist as it wasn't helping so they now sent in a Zpak that she hasn't yet started  Wants to know if we have an orthodontist in the system to refer her too - told her we have the [employer] [facility] but think that's more general dental care.   She did call insurance and has numbers from them of where she can go - just wanted to check with us to see if anything additional we can advise  Told pt to call the numbers insurance gave her  Maragret LEE RN

## 2022-02-04 ENCOUNTER — MYC MEDICAL ADVICE (OUTPATIENT)
Dept: FAMILY MEDICINE | Facility: CLINIC | Age: 82
End: 2022-02-04
Payer: COMMERCIAL

## 2022-02-13 ENCOUNTER — HEALTH MAINTENANCE LETTER (OUTPATIENT)
Age: 82
End: 2022-02-13

## 2022-04-07 ENCOUNTER — HOSPITAL ENCOUNTER (OUTPATIENT)
Dept: MAMMOGRAPHY | Facility: CLINIC | Age: 82
Discharge: HOME | End: 2022-04-07
Attending: FAMILY MEDICINE | Admitting: FAMILY MEDICINE
Payer: COMMERCIAL

## 2022-04-07 DIAGNOSIS — Z12.31 VISIT FOR SCREENING MAMMOGRAM: ICD-10-CM

## 2022-04-07 PROCEDURE — 77067 SCR MAMMO BI INCL CAD: CPT
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
## 2022-05-23 ASSESSMENT — ENCOUNTER SYMPTOMS
COUGH: 0
NERVOUS/ANXIOUS: 0
MYALGIAS: 0
JOINT SWELLING: 1
EYE PAIN: 0
CHILLS: 0
HEMATOCHEZIA: 0
PALPITATIONS: 0
HEADACHES: 0
SHORTNESS OF BREATH: 0
PARESTHESIAS: 0
NAUSEA: 0
WEAKNESS: 0
ABDOMINAL PAIN: 0
SORE THROAT: 0
BREAST MASS: 0
DYSURIA: 0
CONSTIPATION: 0
DIZZINESS: 0
HEARTBURN: 0
DIARRHEA: 0
FREQUENCY: 0
ARTHRALGIAS: 1
HEMATURIA: 0
FEVER: 0

## 2022-05-23 ASSESSMENT — PATIENT HEALTH QUESTIONNAIRE - PHQ9
SUM OF ALL RESPONSES TO PHQ QUESTIONS 1-9: 1
10. IF YOU CHECKED OFF ANY PROBLEMS, HOW DIFFICULT HAVE THESE PROBLEMS MADE IT FOR YOU TO DO YOUR WORK, TAKE CARE OF THINGS AT HOME, OR GET ALONG WITH OTHER PEOPLE: NOT DIFFICULT AT ALL
SUM OF ALL RESPONSES TO PHQ QUESTIONS 1-9: 1

## 2022-05-23 ASSESSMENT — ANXIETY QUESTIONNAIRES
6. BECOMING EASILY ANNOYED OR IRRITABLE: SEVERAL DAYS
4. TROUBLE RELAXING: NOT AT ALL
2. NOT BEING ABLE TO STOP OR CONTROL WORRYING: NOT AT ALL
GAD7 TOTAL SCORE: 4
3. WORRYING TOO MUCH ABOUT DIFFERENT THINGS: SEVERAL DAYS
1. FEELING NERVOUS, ANXIOUS, OR ON EDGE: SEVERAL DAYS
8. IF YOU CHECKED OFF ANY PROBLEMS, HOW DIFFICULT HAVE THESE MADE IT FOR YOU TO DO YOUR WORK, TAKE CARE OF THINGS AT HOME, OR GET ALONG WITH OTHER PEOPLE?: NOT DIFFICULT AT ALL
7. FEELING AFRAID AS IF SOMETHING AWFUL MIGHT HAPPEN: SEVERAL DAYS
5. BEING SO RESTLESS THAT IT IS HARD TO SIT STILL: NOT AT ALL
GAD7 TOTAL SCORE: 4
7. FEELING AFRAID AS IF SOMETHING AWFUL MIGHT HAPPEN: SEVERAL DAYS
GAD7 TOTAL SCORE: 4

## 2022-05-23 ASSESSMENT — ACTIVITIES OF DAILY LIVING (ADL): CURRENT_FUNCTION: NO ASSISTANCE NEEDED

## 2022-05-24 ENCOUNTER — OFFICE VISIT (OUTPATIENT)
Dept: FAMILY MEDICINE | Facility: CLINIC | Age: 82
End: 2022-05-24
Payer: COMMERCIAL

## 2022-05-24 VITALS
BODY MASS INDEX: 24.92 KG/M2 | HEIGHT: 64 IN | TEMPERATURE: 97.7 F | HEART RATE: 75 BPM | DIASTOLIC BLOOD PRESSURE: 76 MMHG | WEIGHT: 146 LBS | SYSTOLIC BLOOD PRESSURE: 145 MMHG | OXYGEN SATURATION: 96 % | RESPIRATION RATE: 16 BRPM

## 2022-05-24 DIAGNOSIS — F41.1 ANXIETY STATE: ICD-10-CM

## 2022-05-24 DIAGNOSIS — M79.672 PAIN IN BOTH FEET: ICD-10-CM

## 2022-05-24 DIAGNOSIS — M85.851 OSTEOPENIA OF BOTH HIPS: ICD-10-CM

## 2022-05-24 DIAGNOSIS — M85.852 OSTEOPENIA OF BOTH HIPS: ICD-10-CM

## 2022-05-24 DIAGNOSIS — E78.5 HYPERLIPIDEMIA WITH TARGET LDL LESS THAN 130: ICD-10-CM

## 2022-05-24 DIAGNOSIS — M79.671 PAIN IN BOTH FEET: ICD-10-CM

## 2022-05-24 DIAGNOSIS — F33.1 MAJOR DEPRESSIVE DISORDER, RECURRENT, MODERATE (H): ICD-10-CM

## 2022-05-24 DIAGNOSIS — Z00.00 ENCOUNTER FOR MEDICARE ANNUAL WELLNESS EXAM: Primary | ICD-10-CM

## 2022-05-24 LAB
ANION GAP SERPL CALCULATED.3IONS-SCNC: 6 MMOL/L (ref 3–14)
BUN SERPL-MCNC: 19 MG/DL (ref 7–30)
CALCIUM SERPL-MCNC: 8.8 MG/DL (ref 8.5–10.1)
CHLORIDE BLD-SCNC: 108 MMOL/L (ref 94–109)
CHOLEST SERPL-MCNC: 163 MG/DL
CO2 SERPL-SCNC: 25 MMOL/L (ref 20–32)
CREAT SERPL-MCNC: 0.83 MG/DL (ref 0.52–1.04)
DEPRECATED CALCIDIOL+CALCIFEROL SERPL-MC: 34 UG/L (ref 20–75)
FASTING STATUS PATIENT QL REPORTED: YES
GFR SERPL CREATININE-BSD FRML MDRD: 70 ML/MIN/1.73M2
GLUCOSE BLD-MCNC: 86 MG/DL (ref 70–99)
HDLC SERPL-MCNC: 57 MG/DL
LDLC SERPL CALC-MCNC: 90 MG/DL
NONHDLC SERPL-MCNC: 106 MG/DL
POTASSIUM BLD-SCNC: 4.1 MMOL/L (ref 3.4–5.3)
SODIUM SERPL-SCNC: 139 MMOL/L (ref 133–144)
TRIGL SERPL-MCNC: 78 MG/DL

## 2022-05-24 PROCEDURE — 36415 COLL VENOUS BLD VENIPUNCTURE: CPT | Performed by: FAMILY MEDICINE

## 2022-05-24 PROCEDURE — 80048 BASIC METABOLIC PNL TOTAL CA: CPT | Performed by: FAMILY MEDICINE

## 2022-05-24 PROCEDURE — 99397 PER PM REEVAL EST PAT 65+ YR: CPT | Performed by: FAMILY MEDICINE

## 2022-05-24 PROCEDURE — 80061 LIPID PANEL: CPT | Performed by: FAMILY MEDICINE

## 2022-05-24 PROCEDURE — 82306 VITAMIN D 25 HYDROXY: CPT | Performed by: FAMILY MEDICINE

## 2022-05-24 RX ORDER — ALENDRONATE SODIUM 35 MG/1
35 TABLET ORAL
Qty: 12 TABLET | Refills: 2 | Status: SHIPPED | OUTPATIENT
Start: 2022-05-24 | End: 2023-05-08

## 2022-05-24 RX ORDER — ATORVASTATIN CALCIUM 20 MG/1
20 TABLET, FILM COATED ORAL DAILY
Qty: 90 TABLET | Refills: 3 | Status: SHIPPED | OUTPATIENT
Start: 2022-05-24 | End: 2023-07-18

## 2022-05-24 RX ORDER — ESCITALOPRAM OXALATE 5 MG/1
5 TABLET ORAL DAILY
Qty: 90 TABLET | Refills: 1 | Status: SHIPPED | OUTPATIENT
Start: 2022-05-24 | End: 2022-10-05

## 2022-05-24 ASSESSMENT — ACTIVITIES OF DAILY LIVING (ADL): CURRENT_FUNCTION: NO ASSISTANCE NEEDED

## 2022-05-24 NOTE — PATIENT INSTRUCTIONS
Patient Education   Personalized Prevention Plan  You are due for the preventive services outlined below.  Your care team is available to assist you in scheduling these services.  If you have already completed any of these items, please share that information with your care team to update in your medical record.  Health Maintenance Due   Topic Date Due     ANNUAL REVIEW OF  ORDERS  Never done     Zoster (Shingles) Vaccine (2 of 3) 03/11/2009     Colorectal Cancer Screening  01/06/2022       Signs of Hearing Loss      Hearing much better with one ear can be a sign of hearing loss.   Hearing loss is a problem shared by many people. In fact, it is one of the most common health problems, particularly as people age. Most people age 65 and older have some hearing loss. By age 80, almost everyone does. Hearing loss often occurs slowly over the years. So you may not realize your hearing has gotten worse.  Have your hearing checked  Call your healthcare provider if you:    Have to strain to hear normal conversation    Have to watch other people s faces very carefully to follow what they re saying    Need to ask people to repeat what they ve said    Often misunderstand what people are saying    Turn the volume of the television or radio up so high that others complain    Feel that people are mumbling when they re talking to you    Find that the effort to hear leaves you feeling tired and irritated    Notice, when using the phone, that you hear better with one ear than the other  [employer] last reviewed this educational content on 1/1/2020 2000-2021 [employer]. All rights reserved. This information is not intended as a substitute for professional medical care. Always follow your healthcare professional's instructions.          Urinary Incontinence, Female (Adult)   Urinary incontinence means loss of bladder control. This problem affects many women, especially as they get older. If you have incontinence,  you may be embarrassed to ask for help. But know that this problem can be treated.   Types of Incontinence  There are different types of incontinence. Two of the main types are described here. You can have more than one type.     Stress incontinence. With this type, urine leaks when pressure (stress) is put on the bladder. This may happen when you cough, sneeze, or laugh. Stress incontinence most often occurs because the pelvic floor muscles that support the bladder and urethra are weak. This can happen after pregnancy and vaginal childbirth or a hysterectomy. It can also be due to excess body weight or hormone changes.    Urge incontinence (also called overactive bladder). With this type, a sudden urge to urinate is felt often. This may happen even though there may not be much urine in the bladder. The need to urinate often during the night is common. Urge incontinence most often occurs because of bladder spasms. This may be due to bladder irritation or infection. Damage to bladder nerves or pelvic muscles, constipation, and certain medicines can also lead to urge incontinence.  Treatment depends on the cause. Further evaluation is needed to find the type you have. This will likely include an exam and certain tests. Based on the results, you and your healthcare provider can then plan treatment. Until a diagnosis is made, the home care tips below can help ease symptoms.   Home care    Do pelvic floor muscle exercises, if they are prescribed. The pelvic floor muscles help support the bladder and urethra. Many women find that their symptoms improve when doing special exercises that strengthen these muscles. To do the exercises, contract the muscles you would use to stop your stream of urine. But do this when you re not urinating. Hold for 10 seconds, then relax. Repeat 10 to 20 times in a row, at least 3 times a day. Your healthcare provider may give you other instructions for how to do the exercises and how  often.    Keep a bladder diary. This helps track how often and how much you urinate over a set period of time. Bring this diary with you to your next visit with the provider. The information can help your provider learn more about your bladder problem.    Lose weight, if advised to by your provider. Extra weight puts pressure on the bladder. Your provider can help you create a weight-loss plan that s right for you. This may include exercising more and making certain diet changes.    Don't have foods and drinks that may irritate the bladder. These can include alcohol and caffeinated drinks.    Quit smoking. Smoking and other tobacco use can lead to a long-term (chronic) cough that strains the pelvic floor muscles. Smoking may also damage the bladder and urethra. Talk with your provider about treatments or methods you can use to quit smoking.    If drinking large amounts of fluid makes you have symptoms, you may be advised to limit your fluid intake. You may also be advised to drink most of your fluids during the day and to limit fluids at night.    If you re worried about urine leakage or accidents, you may wear absorbent pads to catch urine. Change the pads often. This helps reduce discomfort. It may also reduce the risk of skin or bladder infections.    Follow-up care  Follow up with your healthcare provider, or as directed. It may take some to find the right treatment for your problem. But healthy lifestyle changes can be made right away. These include such things as exercising on a regular basis, eating a healthy diet, losing weight (if needed), and quitting smoking. Your treatment plan may include special therapies or medicines. Certain procedures or surgery may also be options. Talk about any questions you have with your provider.   When to seek medical advice  Call the healthcare provider right away if any of these occur:    Fever of 100.4 F (38 C) or higher, or as directed by your provider    Bladder pain or  fullness    Belly swelling    Nausea or vomiting    Back pain    Weakness, dizziness, or fainting  [employer] last reviewed this educational content on 1/1/2020 2000-2021 [employer]. All rights reserved. This information is not intended as a substitute for professional medical care. Always follow your healthcare professional's instructions.

## 2022-05-24 NOTE — NURSING NOTE
"Chief Complaint   Patient presents with     Physical     initial BP (!) 145/76   Pulse 75   Temp 97.7  F (36.5  C) (Temporal)   Resp 16   Ht 1.632 m (5' 4.25\")   Wt 66.2 kg (146 lb)   SpO2 96%   BMI 24.87 kg/m   Estimated body mass index is 24.87 kg/m  as calculated from the following:    Height as of this encounter: 1.632 m (5' 4.25\").    Weight as of this encounter: 66.2 kg (146 lb).  BP completed using cuff size: regular.   R arm      Health Maintenance that is potentially due pending provider review:  NONE    n/a    Yung Garrett ma  "

## 2022-05-28 NOTE — RESULT ENCOUNTER NOTE
Here are your lab results from your recent visit...  -Your basic metabolic panel (which includes electrolyte levels, blood sugar level and kidney function tests) looks good.  -Your cholesterol panel looks very good on the atorvastatin with a low LDL (the bad cholesterol) and a good HDL (the good cholesterol).   -Your Vitamin D level is okay at '34', but you could try doubling your dose of the Vit D supplement to get the level higher.    Please let me know if you have any questions.  Best,   Branden Delong MD

## 2022-08-23 ENCOUNTER — TELEPHONE (OUTPATIENT)
Dept: FAMILY MEDICINE | Facility: CLINIC | Age: 82
End: 2022-08-23

## 2022-08-23 ENCOUNTER — OFFICE VISIT (OUTPATIENT)
Dept: ORTHOPEDICS | Facility: CLINIC | Age: 82
End: 2022-08-23
Payer: COMMERCIAL

## 2022-08-23 ENCOUNTER — ANCILLARY PROCEDURE (OUTPATIENT)
Dept: GENERAL RADIOLOGY | Facility: CLINIC | Age: 82
End: 2022-08-23
Attending: FAMILY MEDICINE
Payer: COMMERCIAL

## 2022-08-23 VITALS — HEIGHT: 64 IN | BODY MASS INDEX: 24.92 KG/M2 | WEIGHT: 146 LBS

## 2022-08-23 DIAGNOSIS — M25.532 LEFT WRIST PAIN: ICD-10-CM

## 2022-08-23 DIAGNOSIS — M85.80 OSTEOPENIA, UNSPECIFIED LOCATION: ICD-10-CM

## 2022-08-23 DIAGNOSIS — S52.532A CLOSED COLLES' FRACTURE OF LEFT RADIUS, INITIAL ENCOUNTER: Primary | ICD-10-CM

## 2022-08-23 PROCEDURE — 29125 APPL SHORT ARM SPLINT STATIC: CPT | Mod: LT

## 2022-08-23 PROCEDURE — 73110 X-RAY EXAM OF WRIST: CPT | Mod: LT | Performed by: RADIOLOGY

## 2022-08-23 PROCEDURE — 99214 OFFICE O/P EST MOD 30 MIN: CPT | Mod: 25

## 2022-08-23 RX ORDER — TRAMADOL HYDROCHLORIDE 50 MG/1
50 TABLET ORAL EVERY 6 HOURS PRN
Qty: 10 TABLET | Refills: 0 | Status: SHIPPED | OUTPATIENT
Start: 2022-08-23 | End: 2022-08-26

## 2022-08-23 NOTE — TELEPHONE ENCOUNTER
Patient fell last night and hurt wrist. Patient is just requesting the clinic address for the [facility] walk-in. Needs to be in and out quickly and doesn't have time to go to clinic and then be referred for Xray.     Patient denies deformity of hand/wrist/arm. Not a ton of swelling (immediate ice in it) but painful and hard to move wrist. Will go to  now    Coty Johnson RN  [facility]

## 2022-08-23 NOTE — PROGRESS NOTES
CHIEF COMPLAINT:  Pain of the Left Wrist       HISTORY OF PRESENT ILLNESS  Ms. Pemberton is a pleasant 81 year old year old female who presents to clinic today with left wrist pain.  Lawrence explains that she fell backwards and onto an outstretched hand    Onset: sudden  Location: left radial wrist  Quality:  aching, dull and sharp  Duration: 1 days   Severity: 9/10 at worst  Timing:constant  Modifying factors:  resting and non-use makes it better, movement and use makes it worse  Associated signs & symptoms: Tenderness, swelling  Previous similar pain: No  Treatments to date: ice, tylenol, sling    Additional history: as documented    Review of Systems:    Have you recently had a a fever, chills, weight loss? No    Do you have any vision problems? No    Do you have any chest pain or edema? No    Do you have any shortness of breath or wheezing?  No    Do you have stomach problems? No    Do you have any numbness or focal weakness? No    Do you have diabetes? No    Do you have problems with bleeding or clotting? No    Do you have an rashes or other skin lesions? 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     CARDIOVASCULAR SCREENING; LDL GOAL LESS THAN 160     Osteoarthritis     Osteopenia of both hips     Advanced directives, counseling/discussion     Basal cell carcinoma of skin of nose     Hyperlipidemia with target LDL less than 130     Degenerative scoliosis in adult patient     Sacroiliitis (H)     Pulmonary nodules     Postural imbalance       Current Outpatient Medications   Medication Sig Dispense Refill     alendronate (FOSAMAX) 35 MG tablet Take 1 tablet (35 mg) by mouth every 7 days 12 tablet 2     atorvastatin (LIPITOR) 20 MG tablet Take 1 tablet (20 mg)  "by mouth daily 90 tablet 3     escitalopram (LEXAPRO) 5 MG tablet Take 1 tablet (5 mg) by mouth daily 90 tablet 1       Allergies   Allergen Reactions     No Known Drug Allergies        Family History   Problem Relation Age of Onset     Cancer Mother          of cancer at 80     Other Cancer Mother          from cancer at age 81     Cardiovascular Father          of heart attack at 69     Coronary Artery Disease Father          of heart attack at age 69     Neurologic Disorder Brother         fibromyalgia     Mental Illness Brother      Osteoporosis Paternal Grandmother        Additional medical/Social/Surgical histories reviewed in [facility] and updated as appropriate.       PHYSICAL EXAM  Ht 1.632 m (5' 4.25\")   Wt 66.2 kg (146 lb)   BMI 24.87 kg/m        General  - normal appearance, in no obvious distress  Cardiovascular  - 2+ distal radius pulse  - Capillary refill <2s  Musculoskeletal - right wrist  - inspection: normal joint alignment, mild deformity/ distal radius swelling.  - palpation: Bony tenderness at distal radius, no tenderness at the anatomical snuffbox  - ROM:  Pain and decreased motion with wrist flexion and extension.  - strength: Grossly intact  strength, thumb extension against resistance.   - special tests: Deferred  Neuro  - no sensory or motor deficit  Skin  -Ecchymosis at distal radius, no erythema, warmth, or induration, no obvious rash, skin intact  Psych  - interactive, appropriate, normal mood and affect    IMAGING : XR left wrist 3V. Final results and radiologist's interpretation, available in the [facility] health record. Images were reviewed with the patient/family members in the office today. My personal interpretation of the performed imaging is mildly angulated, impacted distal radius fracture with intraarticular extension, but no stepoffs seen at articular surface.     ASSESSMENT & PLAN  Ms. ePmberton is a 81 year old year old female with possible history of osteopenia " currently on Fosamax who presents to clinic today with acute left wrist pain after a fall from ground height yesterday evening.    Clinical examination and x-ray consistent with mildly angulated distal radius fracture with intra-articular extension.    Diagnosis:   1.  Mildly angulated distal radius fracture  2.  Osteopenia  3. Acute pain of left wrist    -Transition to Ortho-Glass splint today  -Elevation, icing and use of Tylenol.  -Discussed non-surgical goals and that currently wrist fracture mophology would be acceptable unless further displacement does occur.  -Continue Fosamax and treatment for osteopenia.  She will be due for a repeat DEXA scan in the next 3 months  -Repeat x-rays in 1 week  -Follow up in 1 week    It was a pleasure seeing Solveig today.    Eleno Mae DO, CAQSM  Primary Care Sports Medicine    Cast/splint application    Date/Time: 8/23/2022 12:28 PM  Performed by: Cirilo Larose ATC  Authorized by: Eleno Mae DO     Consent:     Consent obtained:  Verbal    Consent given by:  Patient    Risks discussed:  Discoloration, numbness, pain and swelling  Pre-procedure details:     Sensation:  Normal  Procedure details:     Laterality:  Left    Location:  Wrist    Wrist:  L wrist    Splint type:  Short arm (static)    Supplies:  Fiberglass  Post-procedure details:     Pain:  Improved    Pain level:  0/10    Sensation:  Normal    Patient tolerance of procedure:  Tolerated well, no immediate complications    Patient provided with cast or splint care instructions: Yes    Comments:      Cirilo Larose ATC on 8/23/2022 at 12:28 PM

## 2022-08-23 NOTE — LETTER
8/23/2022      RE: Lawrence Pemberton  [address] [place] [address]     Dear Colleague,    Thank you for referring your patient, Lawrence Pemberton, to the [facility] [place]. Please see a copy of my visit note below.    CHIEF COMPLAINT:  Pain of the Left Wrist       HISTORY OF PRESENT ILLNESS  Ms. Pemberton is a pleasant 81 year old year old female who presents to clinic today with left wrist pain.  Lawrence explains that she fell backwards and onto an outstretched hand    Onset: sudden  Location: left radial wrist  Quality:  aching, dull and sharp  Duration: 1 days   Severity: 9/10 at worst  Timing:constant  Modifying factors:  resting and non-use makes it better, movement and use makes it worse  Associated signs & symptoms: Tenderness, swelling  Previous similar pain: No  Treatments to date: ice, tylenol, sling    Additional history: as documented    Review of Systems:    Have you recently had a a fever, chills, weight loss? No    Do you have any vision problems? No    Do you have any chest pain or edema? No    Do you have any shortness of breath or wheezing?  No    Do you have stomach problems? No    Do you have any numbness or focal weakness? No    Do you have diabetes? No    Do you have problems with bleeding or clotting? No    Do you have an rashes or other skin lesions? 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     CARDIOVASCULAR SCREENING; LDL GOAL LESS THAN 160     Osteoarthritis     Osteopenia of both hips     Advanced directives, counseling/discussion     Basal cell carcinoma of skin of nose     Hyperlipidemia with target LDL less than 130     Degenerative scoliosis in adult patient     Sacroiliitis (H)      "Pulmonary nodules     Postural imbalance       Current Outpatient Medications   Medication Sig Dispense Refill     alendronate (FOSAMAX) 35 MG tablet Take 1 tablet (35 mg) by mouth every 7 days 12 tablet 2     atorvastatin (LIPITOR) 20 MG tablet Take 1 tablet (20 mg) by mouth daily 90 tablet 3     escitalopram (LEXAPRO) 5 MG tablet Take 1 tablet (5 mg) by mouth daily 90 tablet 1       Allergies   Allergen Reactions     No Known Drug Allergies        Family History   Problem Relation Age of Onset     Cancer Mother          of cancer at 80     Other Cancer Mother          from cancer at age 81     Cardiovascular Father          of heart attack at 69     Coronary Artery Disease Father          of heart attack at age 69     Neurologic Disorder Brother         fibromyalgia     Mental Illness Brother      Osteoporosis Paternal Grandmother        Additional medical/Social/Surgical histories reviewed in [facility] and updated as appropriate.       PHYSICAL EXAM  Ht 1.632 m (5' 4.25\")   Wt 66.2 kg (146 lb)   BMI 24.87 kg/m        General  - normal appearance, in no obvious distress  Cardiovascular  - 2+ distal radius pulse  - Capillary refill <2s  Musculoskeletal - right wrist  - inspection: normal joint alignment, mild deformity/ distal radius swelling.  - palpation: Bony tenderness at distal radius, no tenderness at the anatomical snuffbox  - ROM:  Pain and decreased motion with wrist flexion and extension.  - strength: Grossly intact  strength, thumb extension against resistance.   - special tests: Deferred  Neuro  - no sensory or motor deficit  Skin  -Ecchymosis at distal radius, no erythema, warmth, or induration, no obvious rash, skin intact  Psych  - interactive, appropriate, normal mood and affect    IMAGING : XR left wrist 3V. Final results and radiologist's interpretation, available in the [facility] health record. Images were reviewed with the patient/family members in the office today. My personal " interpretation of the performed imaging is mildly angulated, impacted distal radius fracture with intraarticular extension, but no stepoffs seen at articular surface.     ASSESSMENT & PLAN  Ms. Pemberton is a 81 year old year old female with possible history of osteopenia currently on Fosamax who presents to clinic today with acute left wrist pain after a fall from ground height yesterday evening.    Clinical examination and x-ray consistent with mildly angulated distal radius fracture with intra-articular extension.    Diagnosis:   1.  Mildly angulated distal radius fracture  2.  Osteopenia  3. Acute pain of left wrist    -Transition to Ortho-Glass splint today  -Elevation, icing and use of Tylenol.  -Discussed non-surgical goals and that currently wrist fracture mophology would be acceptable unless further displacement does occur.  -Continue Fosamax and treatment for osteopenia.  She will be due for a repeat DEXA scan in the next 3 months  -Repeat x-rays in 1 week  -Follow up in 1 week    It was a pleasure seeing Solveig today.    Eleno Mae DO, CAQSM  Primary Care Sports Medicine    Cast/splint application    Date/Time: 8/23/2022 12:28 PM  Performed by: Cirilo Larose ATC  Authorized by: Eleno Mae DO     Consent:     Consent obtained:  Verbal    Consent given by:  Patient    Risks discussed:  Discoloration, numbness, pain and swelling  Pre-procedure details:     Sensation:  Normal  Procedure details:     Laterality:  Left    Location:  Wrist    Wrist:  L wrist    Splint type:  Short arm (static)    Supplies:  Fiberglass  Post-procedure details:     Pain:  Improved    Pain level:  0/10    Sensation:  Normal    Patient tolerance of procedure:  Tolerated well, no immediate complications    Patient provided with cast or splint care instructions: Yes    Comments:      Cirilo Larose ATC on 8/23/2022 at 12:28 PM      Again, thank you for allowing me to participate in the care of your patient.       Sincerely,    Eleno Mae, DO

## 2022-08-24 ENCOUNTER — TELEPHONE (OUTPATIENT)
Dept: FAMILY MEDICINE | Facility: CLINIC | Age: 82
End: 2022-08-24

## 2022-08-24 NOTE — TELEPHONE ENCOUNTER
Pt  calling to request information for applying for [employer] mobility- he currently cant drive because he is recovering from stroke and Solveig just broke her wrist and will need follow up appointments.     I am unsure of what that process looks like, please advise/call  back with suggestions.    Erendira KERR RN

## 2022-08-29 DIAGNOSIS — S52.532A CLOSED COLLES' FRACTURE OF LEFT RADIUS, INITIAL ENCOUNTER: Primary | ICD-10-CM

## 2022-08-30 ENCOUNTER — ANCILLARY PROCEDURE (OUTPATIENT)
Dept: GENERAL RADIOLOGY | Facility: CLINIC | Age: 82
End: 2022-08-30
Attending: FAMILY MEDICINE
Payer: COMMERCIAL

## 2022-08-30 ENCOUNTER — OFFICE VISIT (OUTPATIENT)
Dept: ORTHOPEDICS | Facility: CLINIC | Age: 82
End: 2022-08-30
Payer: COMMERCIAL

## 2022-08-30 VITALS — HEIGHT: 64 IN | WEIGHT: 146 LBS | BODY MASS INDEX: 24.92 KG/M2

## 2022-08-30 DIAGNOSIS — S52.532D CLOSED COLLES' FRACTURE OF LEFT RADIUS WITH ROUTINE HEALING, SUBSEQUENT ENCOUNTER: Primary | ICD-10-CM

## 2022-08-30 DIAGNOSIS — S52.532A CLOSED COLLES' FRACTURE OF LEFT RADIUS, INITIAL ENCOUNTER: ICD-10-CM

## 2022-08-30 PROCEDURE — 29075 APPL CST ELBW FNGR SHORT ARM: CPT | Mod: LT

## 2022-08-30 PROCEDURE — 99213 OFFICE O/P EST LOW 20 MIN: CPT | Mod: 25

## 2022-08-30 PROCEDURE — 73110 X-RAY EXAM OF WRIST: CPT | Mod: LT | Performed by: RADIOLOGY

## 2022-08-30 NOTE — LETTER
8/30/2022      RE: Lawrence Pemberton  [address] [place] [address]     Dear Colleague,    Thank you for referring your patient, Lawrence Pemberton, to the [facility] [place]. Please see a copy of my visit note below.    ESTABLISHED PATIENT FOLLOW-UP:  RECHECK (Left wrist)       HISTORY OF PRESENT ILLNESS  Ms. Pemberton is a pleasant 81 year old year old female who presents to clinic today for follow-up of left wrist fracture.     Date of injury/onset: 8/22/22  Date last seen: 8/23/22  Following Therapeutic Plan: Yes  Pain: Improved  Function: Improved  Interval History: Overall doing well in splint, feeling that it has improved since last visit.     Review of Systems:    Do you have fever, chills, weight loss? No    Do you have any vision problems? No    Do you have any chest pain or edema? No    Do you have any shortness of breath or wheezing?  No    Do you have stomach problems? No    Do you have any numbness or focal weakness? No    Do you have diabetes? No    Do you have problems with bleeding or clotting? No    Do you have an rashes or other skin lesions? 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     CARDIOVASCULAR SCREENING; LDL GOAL LESS THAN 160     Osteoarthritis     Osteopenia of both hips     Advanced directives, counseling/discussion     Basal cell carcinoma of skin of nose     Hyperlipidemia with target LDL less than 130     Degenerative scoliosis in adult patient     Sacroiliitis (H)     Pulmonary nodules     Postural imbalance       Current Outpatient Medications   Medication Sig Dispense Refill     alendronate (FOSAMAX) 35 MG tablet Take 1 tablet (35 mg) by mouth every 7 days 12 tablet 2      "atorvastatin (LIPITOR) 20 MG tablet Take 1 tablet (20 mg) by mouth daily 90 tablet 3     escitalopram (LEXAPRO) 5 MG tablet Take 1 tablet (5 mg) by mouth daily 90 tablet 1       Allergies   Allergen Reactions     No Known Drug Allergies        Family History   Problem Relation Age of Onset     Cancer Mother          of cancer at 80     Other Cancer Mother          from cancer at age 81     Cardiovascular Father          of heart attack at 69     Coronary Artery Disease Father          of heart attack at age 69     Neurologic Disorder Brother         fibromyalgia     Mental Illness Brother      Osteoporosis Paternal Grandmother        Additional medical/Social/Surgical histories reviewed in [facility] and updated as appropriate.       PHYSICAL EXAM  Ht 1.632 m (5' 4.25\")   Wt 66.2 kg (146 lb)   BMI 24.87 kg/m        General  - normal appearance, in no obvious distress  Cardiovascular  - 2+ distal radius pulse  - Capillary refill <2s  Musculoskeletal - right wrist  - inspection: normal joint alignment, mild deformity/ distal radius swelling.  - palpation: Bony tenderness at distal radius, no tenderness at the anatomical snuffbox  - ROM:  Pain and decreased motion with wrist flexion and extension.  - strength: Grossly intact  strength, thumb extension against resistance.   - special tests: Deferred  Neuro  - no sensory or motor deficit  Skin  -Ecchymosis at distal radius, no erythema, warmth, or induration, no obvious rash, skin intact  Psych  - interactive, appropriate, normal mood and affect    IMAGING : XR left wrist 3V: Final results and radiologist's interpretation, available in the [facility] health record. Images were reviewed with the patient/family members in the office today. My personal interpretation of the performed imaging is stable appearance of dorsally angulated transverse radial fracture.  Fracture more clearly seen at lunate facet today without articular stepoff.     ASSESSMENT & PLAN  Ms. " Nilay is a 81 year old year old female who presents to clinic today with RECHECK (Left wrist)    Discussed risks benefits and alternatives to casting.  Discussed consideration for ORIF and wishes to pursue conservative management.  Reviewed possibility of loss of terminal flexion and stiffness that may persist beyond fracture healing.    -Cast applied today, short arm fiberglass  -Instructions for cast reviewed with patient  -Monitor fracture closely given risk for displacement or further dorsal collapse.  -Anticipate 6 weeks in cast followed by OT. Will attempt to schedule OT for zipper orthosis if needed after term of cast.  -Follow up in 1 week repeat imaging in cast.    It was a pleasure seeing Solveig.    Eleno Mae DO, CAQSM  Primary Care Sports Medicine    Cast/splint application    Date/Time: 8/30/2022 12:24 PM  Performed by: Shayla Posadas ATC  Authorized by: Eleno Mae DO     Consent:     Consent obtained:  Verbal    Consent given by:  Patient    Risks discussed:  Discoloration, numbness, pain and swelling  Pre-procedure details:     Sensation:  Normal  Procedure details:     Laterality:  Left    Location:  Wrist    Wrist:  L wrist    Cast type:  Short arm    Supplies:  Fiberglass  Post-procedure details:     Pain:  Unchanged    Sensation:  Normal    Patient tolerance of procedure:  Tolerated well, no immediate complications    Patient provided with cast or splint care instructions: Yes        Again, thank you for allowing me to participate in the care of your patient.      Sincerely,    Eleno Mae DO

## 2022-08-30 NOTE — PROGRESS NOTES
ESTABLISHED PATIENT FOLLOW-UP:  RECHECK (Left wrist)       HISTORY OF PRESENT ILLNESS  Ms. Pemberton is a pleasant 81 year old year old female who presents to clinic today for follow-up of left wrist fracture.     Date of injury/onset: 8/22/22  Date last seen: 8/23/22  Following Therapeutic Plan: Yes  Pain: Improved  Function: Improved  Interval History: Overall doing well in splint, feeling that it has improved since last visit.     Review of Systems:    Do you have fever, chills, weight loss? No    Do you have any vision problems? No    Do you have any chest pain or edema? No    Do you have any shortness of breath or wheezing?  No    Do you have stomach problems? No    Do you have any numbness or focal weakness? No    Do you have diabetes? No    Do you have problems with bleeding or clotting? No    Do you have an rashes or other skin lesions? 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     CARDIOVASCULAR SCREENING; LDL GOAL LESS THAN 160     Osteoarthritis     Osteopenia of both hips     Advanced directives, counseling/discussion     Basal cell carcinoma of skin of nose     Hyperlipidemia with target LDL less than 130     Degenerative scoliosis in adult patient     Sacroiliitis (H)     Pulmonary nodules     Postural imbalance       Current Outpatient Medications   Medication Sig Dispense Refill     alendronate (FOSAMAX) 35 MG tablet Take 1 tablet (35 mg) by mouth every 7 days 12 tablet 2     atorvastatin (LIPITOR) 20 MG tablet Take 1 tablet (20 mg) by mouth daily 90 tablet 3     escitalopram (LEXAPRO) 5 MG tablet Take 1 tablet (5 mg) by mouth daily 90 tablet 1       Allergies   Allergen Reactions     No Known Drug Allergies        Family History   Problem Relation  "Age of Onset     Cancer Mother          of cancer at 80     Other Cancer Mother          from cancer at age 81     Cardiovascular Father          of heart attack at 69     Coronary Artery Disease Father          of heart attack at age 69     Neurologic Disorder Brother         fibromyalgia     Mental Illness Brother      Osteoporosis Paternal Grandmother        Additional medical/Social/Surgical histories reviewed in [facility] and updated as appropriate.       PHYSICAL EXAM  Ht 1.632 m (5' 4.25\")   Wt 66.2 kg (146 lb)   BMI 24.87 kg/m        General  - normal appearance, in no obvious distress  Cardiovascular  - 2+ distal radius pulse  - Capillary refill <2s  Musculoskeletal - right wrist  - inspection: normal joint alignment, mild deformity/ distal radius swelling.  - palpation: Bony tenderness at distal radius, no tenderness at the anatomical snuffbox  - ROM:  Pain and decreased motion with wrist flexion and extension.  - strength: Grossly intact  strength, thumb extension against resistance.   - special tests: Deferred  Neuro  - no sensory or motor deficit  Skin  -Ecchymosis at distal radius, no erythema, warmth, or induration, no obvious rash, skin intact  Psych  - interactive, appropriate, normal mood and affect    IMAGING : XR left wrist 3V: Final results and radiologist's interpretation, available in the [facility] health record. Images were reviewed with the patient/family members in the office today. My personal interpretation of the performed imaging is stable appearance of dorsally angulated transverse radial fracture.  Fracture more clearly seen at lunate facet today without articular stepoff.     ASSESSMENT & PLAN  Ms. Pemberton is a 81 year old year old female who presents to clinic today with RECHECK (Left wrist)    Discussed risks benefits and alternatives to casting.  Discussed consideration for ORIF and wishes to pursue conservative management.  Reviewed possibility of loss of terminal " flexion and stiffness that may persist beyond fracture healing.    -Cast applied today, short arm fiberglass  -Instructions for cast reviewed with patient  -Monitor fracture closely given risk for displacement or further dorsal collapse.  -Anticipate 6 weeks in cast followed by OT. Will attempt to schedule OT for zipper orthosis if needed after term of cast.  -Follow up in 1 week repeat imaging in cast.    It was a pleasure seeing Lawrence.    Eleno Mae DO, CAQSM  Primary Care Sports Medicine    Cast/splint application    Date/Time: 8/30/2022 12:24 PM  Performed by: Shayla Posadas ATC  Authorized by: Eleno Mae DO     Consent:     Consent obtained:  Verbal    Consent given by:  Patient    Risks discussed:  Discoloration, numbness, pain and swelling  Pre-procedure details:     Sensation:  Normal  Procedure details:     Laterality:  Left    Location:  Wrist    Wrist:  L wrist    Cast type:  Short arm    Supplies:  Fiberglass  Post-procedure details:     Pain:  Unchanged    Sensation:  Normal    Patient tolerance of procedure:  Tolerated well, no immediate complications    Patient provided with cast or splint care instructions: Yes

## 2022-09-01 DIAGNOSIS — S52.532D CLOSED COLLES' FRACTURE OF LEFT RADIUS WITH ROUTINE HEALING, SUBSEQUENT ENCOUNTER: Primary | ICD-10-CM

## 2022-09-06 ENCOUNTER — OFFICE VISIT (OUTPATIENT)
Dept: ORTHOPEDICS | Facility: CLINIC | Age: 82
End: 2022-09-06
Payer: COMMERCIAL

## 2022-09-06 DIAGNOSIS — S52.502D TRAUMATIC CLOSED DISPLACED FRACTURE OF DISTAL END OF RADIUS, LEFT, WITH ROUTINE HEALING, SUBSEQUENT ENCOUNTER: Primary | ICD-10-CM

## 2022-09-06 PROCEDURE — 99213 OFFICE O/P EST LOW 20 MIN: CPT | Performed by: FAMILY MEDICINE

## 2022-09-06 NOTE — LETTER
9/6/2022      RE: Lawrence Pemberton  [address] [place] [address]     Dear Colleague,    Thank you for referring your patient, Lawrence Pemberton, to the [facility] [place]. Please see a copy of my visit note below.    ESTABLISHED PATIENT FOLLOW-UP:  Follow Up of the Left Wrist       HISTORY OF PRESENT ILLNESS  Ms. Pemberton is a pleasant 81 year old year old female who presents to clinic today for follow-up of a left distal radius fx    Date of injury: 8/22/22  Date last seen: 8/30/22  Following Therapeutic Plan: Cast  Pain: improving  Function: improved motion of the fingers   Interval History: Pt notes a decrease in pain since the application of the cast.  Cast fitting well. No other issues today.    Additional medical/Social/Surgical histories reviewed in [facility] and updated as appropriate.    REVIEW OF SYSTEMS (9/6/2022)  CONSTITUTIONAL: Denies fever and weight loss  GASTROINTESTINAL: Denies abdominal pain, nausea, vomiting  MUSCULOSKELETAL: See HPI  SKIN: Denies any recent rash or lesion  NEUROLOGICAL: Denies numbness or focal weakness     PHYSICAL EXAM  There were no vitals taken for this visit.    General  - normal appearance, in no obvious distress  Musculoskeletal - Left wrist  - inspection: normal joint alignment, no obvious deformity, no swelling of fingers, cast intact and fitting appropriately  - palpation: no bony or soft tissue tenderness at margins of cast. Non-tender fingers  - ROM:  Full finger range of motion. Elbow ROM full, supination and pronation grossly intact  - strength: 5/5  strength, 5/5 hand intrinsics, 5/5 thumb extension. 5/5 A-OK sign.   - special tests: Capillary refill <2s  Neuro  - no sensory or motor deficit, grossly normal coordination, normal muscle tone  Skin  - no ecchymosis, erythema, warmth, or induration, no obvious rash  Psych  - interactive, appropriate, normal mood and affect    IMAGING : XR right wrist 3V. Final results and  radiologist's interpretation, available in the [facility] health record. Images were reviewed with the patient/family members in the office today. My personal interpretation of the performed imaging is redemonstration of comminuted, impacted distal radius fracture with intraarticular extension and mild radial tilt.     ASSESSMENT & PLAN  Ms. Pemberton is a 81 year old year old female who presents to clinic today with Follow Up of the Left Wrist    Overall stable alignment of intraarticular, impacted fracture.  Again she understands risks for stiffness and loss of terminal flexion, ulnar deviation despite appropriate treatment otherwise, OT.    Diagnosis:  (S50.694E) Traumatic closed displaced fracture of distal end of radius, left, with routine healing, subsequent encounter  (primary encounter diagnosis)      -Continue closed immobilization in cast  -Continue elevation, finger mobility, OTC tylenol as needed  -Moleskin added to cast for protection  -Follow up in 2.5 weeks with repeat xrays out of cast. Suspect we will change cast and continue minimum 6 weeks in short arm cast. Anticipate OT to follow closed immobilization.    It was a pleasure seeing Christopherig.    Eleno Mae DO, CAQSM  Primary Care Sports Medicine

## 2022-09-06 NOTE — PROGRESS NOTES
ESTABLISHED PATIENT FOLLOW-UP:  Follow Up of the Left Wrist       HISTORY OF PRESENT ILLNESS  Ms. Pemberton is a pleasant 81 year old year old female who presents to clinic today for follow-up of a left distal radius fx    Date of injury: 8/22/22  Date last seen: 8/30/22  Following Therapeutic Plan: Cast  Pain: improving  Function: improved motion of the fingers   Interval History: Pt notes a decrease in pain since the application of the cast.  Cast fitting well. No other issues today.    Additional medical/Social/Surgical histories reviewed in [facility] and updated as appropriate.    REVIEW OF SYSTEMS (9/6/2022)  CONSTITUTIONAL: Denies fever and weight loss  GASTROINTESTINAL: Denies abdominal pain, nausea, vomiting  MUSCULOSKELETAL: See HPI  SKIN: Denies any recent rash or lesion  NEUROLOGICAL: Denies numbness or focal weakness     PHYSICAL EXAM  There were no vitals taken for this visit.    General  - normal appearance, in no obvious distress  Musculoskeletal - Left wrist  - inspection: normal joint alignment, no obvious deformity, no swelling of fingers, cast intact and fitting appropriately  - palpation: no bony or soft tissue tenderness at margins of cast. Non-tender fingers  - ROM:  Full finger range of motion. Elbow ROM full, supination and pronation grossly intact  - strength: 5/5  strength, 5/5 hand intrinsics, 5/5 thumb extension. 5/5 A-OK sign.   - special tests: Capillary refill <2s  Neuro  - no sensory or motor deficit, grossly normal coordination, normal muscle tone  Skin  - no ecchymosis, erythema, warmth, or induration, no obvious rash  Psych  - interactive, appropriate, normal mood and affect    IMAGING : XR right wrist 3V. Final results and radiologist's interpretation, available in the [facility] health record. Images were reviewed with the patient/family members in the office today. My personal interpretation of the performed imaging is redemonstration of comminuted, impacted distal radius fracture with  intraarticular extension and mild radial tilt.     ASSESSMENT & PLAN  Ms. Pemberton is a 81 year old year old female who presents to clinic today with Follow Up of the Left Wrist    Overall stable alignment of intraarticular, impacted fracture.  Again she understands risks for stiffness and loss of terminal flexion, ulnar deviation despite appropriate treatment otherwise, OT.    Diagnosis:  (S57.675D) Traumatic closed displaced fracture of distal end of radius, left, with routine healing, subsequent encounter  (primary encounter diagnosis)      -Continue closed immobilization in cast  -Continue elevation, finger mobility, OTC tylenol as needed  -Moleskin added to cast for protection  -Follow up in 2.5 weeks with repeat xrays out of cast. Suspect we will change cast and continue minimum 6 weeks in short arm cast. Anticipate OT to follow closed immobilization.    It was a pleasure seeing Lawrence.    Eleno Mae DO, CAQSM  Primary Care Sports Medicine

## 2022-09-21 DIAGNOSIS — S52.502D TRAUMATIC CLOSED DISPLACED FRACTURE OF DISTAL END OF RADIUS, LEFT, WITH ROUTINE HEALING, SUBSEQUENT ENCOUNTER: Primary | ICD-10-CM

## 2022-09-23 ENCOUNTER — ANCILLARY PROCEDURE (OUTPATIENT)
Dept: GENERAL RADIOLOGY | Facility: CLINIC | Age: 82
End: 2022-09-23
Attending: FAMILY MEDICINE
Payer: COMMERCIAL

## 2022-09-23 ENCOUNTER — OFFICE VISIT (OUTPATIENT)
Dept: ORTHOPEDICS | Facility: CLINIC | Age: 82
End: 2022-09-23
Payer: COMMERCIAL

## 2022-09-23 DIAGNOSIS — S52.502D TRAUMATIC CLOSED DISPLACED FRACTURE OF DISTAL END OF RADIUS, LEFT, WITH ROUTINE HEALING, SUBSEQUENT ENCOUNTER: ICD-10-CM

## 2022-09-23 DIAGNOSIS — S52.502D TRAUMATIC CLOSED DISPLACED FRACTURE OF DISTAL END OF RADIUS, LEFT, WITH ROUTINE HEALING, SUBSEQUENT ENCOUNTER: Primary | ICD-10-CM

## 2022-09-23 PROCEDURE — 29075 APPL CST ELBW FNGR SHORT ARM: CPT | Mod: LT

## 2022-09-23 PROCEDURE — 99213 OFFICE O/P EST LOW 20 MIN: CPT | Mod: 25

## 2022-09-23 PROCEDURE — 73110 X-RAY EXAM OF WRIST: CPT | Mod: LT | Performed by: RADIOLOGY

## 2022-09-23 NOTE — PROGRESS NOTES
ESTABLISHED PATIENT FOLLOW-UP:  Pain of the Left Wrist       HISTORY OF PRESENT ILLNESS  Ms. Pemberton is a pleasant 81 year old year old female who presents to clinic today for follow-up of Left distal radius fx    Date of injury: 8/22/22  Date last seen: 9/6/22  Following Therapeutic Plan: Cast  Pain: Improved  Function: Improved  Interval History: Pt notes continued decrease in pain since the previous visit     Additional medical/Social/Surgical histories reviewed in [facility] and updated as appropriate.    REVIEW OF SYSTEMS (9/23/2022)  CONSTITUTIONAL: Denies fever and weight loss  GASTROINTESTINAL: Denies abdominal pain, nausea, vomiting  MUSCULOSKELETAL: See HPI  SKIN: Denies any recent rash or lesion  NEUROLOGICAL: Denies numbness or focal weakness     PHYSICAL EXAM  There were no vitals taken for this visit.      General  - normal appearance, in no obvious distress  Cardiovascular  - 2+ distal radius pulse  - Capillary refill <2s  Musculoskeletal - right wrist  - inspection:  mild deformity/ distal radius swelling.  - palpation: Bony tenderness at distal radius, no tenderness at the anatomical snuffbox  - ROM:  Pain and decreased motion with wrist flexion and extension.  - strength: Grossly intact  strength, thumb extension against resistance.   - special tests: Deferred  Neuro  - no sensory or motor deficit  Skin  -Ecchymosis at distal radius, no erythema, warmth, or induration, no obvious rash, skin intact  Psych  - interactive, appropriate, normal mood and affect    IMAGING : XR wrist left 3V. Final results and radiologist's interpretation, available in the [facility] health record. Images were reviewed with the patient/family members in the office today. My personal interpretation of the performed imaging is ongoing healing of impacted, intra-articular distal radius fracture with increasing callus.  Significant ulnar positive variance.      ASSESSMENT & PLAN  Ms. Pemberton is a 81 year old year old female who presents  to clinic today with Pain of the Left Wrist    Diagnosis:  (S52.502D) Traumatic closed displaced fracture of distal end of radius, left, with routine healing, subsequent encounter  (primary encounter diagnosis)    -Cast change today  -Plan for OT visit immediately following in 4 weeks visit with repeat xrays.  -Continue use of hands/fingers and encouraged as form of therapy  -Anticipate ongoing OT to address wrist stiffness after this fracture. Discussed given ulnar positive variance and impacted nature, residual stiffness or loss of motion may persist long term  -Follow up in 4 weeks.    It was a pleasure seeing Solveig.    Eleno Mae DO CAQSM  Primary Care Sports Medicine        Cast/splint application    Date/Time: 9/23/2022 11:33 AM  Performed by: Cirilo Larose ATC  Authorized by: Eleno Mae DO     Consent:     Consent obtained:  Verbal    Consent given by:  Patient    Alternatives discussed:  No treatment  Pre-procedure details:     Sensation:  Normal  Procedure details:     Laterality:  Left    Location:  Wrist    Wrist:  L wrist    Cast type:  Short arm    Supplies:  Fiberglass  Post-procedure details:     Pain:  Improved    Pain level:  0/10    Sensation:  Normal    Patient tolerance of procedure:  Tolerated well, no immediate complications    Patient provided with cast or splint care instructions: Yes    Comments:      Cirilo Larose ATC on 9/23/2022 at 11:34 AM

## 2022-09-23 NOTE — LETTER
9/23/2022      RE: Lawrence Pemberton  [address] [place] [address]     Dear Colleague,    Thank you for referring your patient, Lawrence Pemberton, to the [facility] [place]. Please see a copy of my visit note below.    ESTABLISHED PATIENT FOLLOW-UP:  Pain of the Left Wrist       HISTORY OF PRESENT ILLNESS  Ms. Pemberton is a pleasant 81 year old year old female who presents to clinic today for follow-up of Left distal radius fx    Date of injury: 8/22/22  Date last seen: 9/6/22  Following Therapeutic Plan: Cast  Pain: Improved  Function: Improved  Interval History: Pt notes continued decrease in pain since the previous visit     Additional medical/Social/Surgical histories reviewed in [facility] and updated as appropriate.    REVIEW OF SYSTEMS (9/23/2022)  CONSTITUTIONAL: Denies fever and weight loss  GASTROINTESTINAL: Denies abdominal pain, nausea, vomiting  MUSCULOSKELETAL: See HPI  SKIN: Denies any recent rash or lesion  NEUROLOGICAL: Denies numbness or focal weakness     PHYSICAL EXAM  There were no vitals taken for this visit.      General  - normal appearance, in no obvious distress  Cardiovascular  - 2+ distal radius pulse  - Capillary refill <2s  Musculoskeletal - right wrist  - inspection:  mild deformity/ distal radius swelling.  - palpation: Bony tenderness at distal radius, no tenderness at the anatomical snuffbox  - ROM:  Pain and decreased motion with wrist flexion and extension.  - strength: Grossly intact  strength, thumb extension against resistance.   - special tests: Deferred  Neuro  - no sensory or motor deficit  Skin  -Ecchymosis at distal radius, no erythema, warmth, or induration, no obvious rash, skin intact  Psych  - interactive, appropriate, normal mood and affect    IMAGING : XR wrist left 3V. Final results and radiologist's interpretation, available in the [facility] health record. Images were reviewed with the patient/family members in the office  today. My personal interpretation of the performed imaging is ongoing healing of impacted, intra-articular distal radius fracture with increasing callus.  Significant ulnar positive variance.      ASSESSMENT & PLAN  Ms. Pemberton is a 81 year old year old female who presents to clinic today with Pain of the Left Wrist    Diagnosis:  (S52.502D) Traumatic closed displaced fracture of distal end of radius, left, with routine healing, subsequent encounter  (primary encounter diagnosis)    -Cast change today  -Plan for OT visit immediately following in 4 weeks visit with repeat xrays.  -Continue use of hands/fingers and encouraged as form of therapy  -Anticipate ongoing OT to address wrist stiffness after this fracture. Discussed given ulnar positive variance and impacted nature, residual stiffness or loss of motion may persist long term  -Follow up in 4 weeks.    It was a pleasure seeing Solveig.    Eleno Mae DO, CAQSM  Primary Care Sports Medicine        Cast/splint application    Date/Time: 9/23/2022 11:33 AM  Performed by: Cirilo Larose ATC  Authorized by: Eleno Mae DO     Consent:     Consent obtained:  Verbal    Consent given by:  Patient    Alternatives discussed:  No treatment  Pre-procedure details:     Sensation:  Normal  Procedure details:     Laterality:  Left    Location:  Wrist    Wrist:  L wrist    Cast type:  Short arm    Supplies:  Fiberglass  Post-procedure details:     Pain:  Improved    Pain level:  0/10    Sensation:  Normal    Patient tolerance of procedure:  Tolerated well, no immediate complications    Patient provided with cast or splint care instructions: Yes    Comments:      Cirilo Larose ATC on 9/23/2022 at 11:34 AM

## 2022-09-28 ENCOUNTER — MYC MEDICAL ADVICE (OUTPATIENT)
Dept: ORTHOPEDICS | Facility: CLINIC | Age: 82
End: 2022-09-28

## 2022-09-28 DIAGNOSIS — S52.502D TRAUMATIC CLOSED DISPLACED FRACTURE OF DISTAL END OF RADIUS, LEFT, WITH ROUTINE HEALING, SUBSEQUENT ENCOUNTER: Primary | ICD-10-CM

## 2022-10-03 ENCOUNTER — NURSE TRIAGE (OUTPATIENT)
Dept: FAMILY MEDICINE | Facility: CLINIC | Age: 82
End: 2022-10-03

## 2022-10-03 NOTE — TELEPHONE ENCOUNTER
"Patient calling with concern for feelings of being \"tired\"  Patient states she has had a lot of stressful events in the last few months-  had a stroke, has assumed lots of caregiver responsibilities.  Describes that she is \"out of energy\" and feeling \"sad\"  Patient denies thoughts of harming self, having plans to harm self.  Feels that it is making it harder for her to do things, but also recognizes she is having a lot of stress.  Wondering about possibility of starting medication for this.  Scheduled for appointment.  Meenu KIRK RN    Reason for Disposition    Patient wants to be seen    Additional Information    Negative: Patient attempted suicide    Negative: Patient is threatening suicide now    Negative: Violent behavior, or threatening to physically hurt or kill someone    Negative: Patient is very confused (disoriented, slurred speech) and no other adult (e.g., friend or family member) available    Negative: Difficult to awaken or acting very confused (disoriented, slurred speech) and new-onset    Negative: Sounds like a life-threatening emergency to the triager    Negative: Suicide thoughts, threats, attempts, or questions    Negative: Questions or concerns about alcohol use, unhealthy alcohol use, binge drinking, intoxication, or withdrawal    Negative: Questions or concerns about substance use (drug use), unhealthy drug use, intoxication, or withdrawal    Negative: Anxiety is main problem or symptom    Answer Assessment - Initial Assessment Questions  1. CONCERN: \"What happened that made you call today?\"      Feeling tired.  2. DEPRESSION SYMPTOM SCREENING: \"How are you feeling overall?\" (e.g., decreased energy, increased sleeping or difficulty sleeping, difficulty concentrating, feelings of sadness, guilt, hopelessness, or worthlessness)      Decreased energy, feelings of sadness  3. RISK OF HARM - SUICIDAL IDEATION:  \"Do you ever have thoughts of hurting or killing yourself?\"  (e.g., yes, no, no " "but preoccupation with thoughts about death)    - INTENT:  \"Do you have thoughts of hurting or killing yourself right NOW?\" (e.g., yes, no, N/A)    - PLAN: \"Do you have a specific plan for how you would do this?\" (e.g., gun, knife, overdose, no plan, N/A)      NO  4. RISK OF HARM - HOMICIDAL IDEATION:  \"Do you ever have thoughts of hurting or killing someone else?\"  (e.g., yes, no, no but preoccupation with thoughts about death)    - INTENT:  \"Do you have thoughts of hurting or killing someone right NOW?\" (e.g., yes, no, N/A)    - PLAN: \"Do you have a specific plan for how you would do this?\" (e.g., gun, knife, no plan, N/A)       NO  5. FUNCTIONAL IMPAIRMENT: \"How have things been going for you overall? Have you had more difficulty than usual doing your normal daily activities?\"  (e.g., better, same, worse; self-care, school, work, interactions)      Doesn't feel like she can keep up in the [address]  6. SUPPORT: \"Who is with you now?\" \"Who do you live with?\" \"Do you have family or friends who you can talk to?\"        had a stroke, unable to rely on him on same ways as before  7. THERAPIST: \"Do you have a counselor or therapist? Name?\"      No  8. STRESSORS: \"Has there been any new stress or recent changes in your life?\"       had stroke  9. ALCOHOL USE OR SUBSTANCE USE (DRUG USE): \"Do you drink alcohol or use any illegal drugs?\"      Not much  10. OTHER: \"Do you have any other physical symptoms right now?\" (e.g., fever)        No  11. PREGNANCY: \"Is there any chance you are pregnant?\" \"When was your last menstrual period?\"        No    Protocols used: DEPRESSION-A-OH      "

## 2022-10-04 ASSESSMENT — ANXIETY QUESTIONNAIRES
6. BECOMING EASILY ANNOYED OR IRRITABLE: MORE THAN HALF THE DAYS
2. NOT BEING ABLE TO STOP OR CONTROL WORRYING: MORE THAN HALF THE DAYS
1. FEELING NERVOUS, ANXIOUS, OR ON EDGE: MORE THAN HALF THE DAYS
GAD7 TOTAL SCORE: 10
IF YOU CHECKED OFF ANY PROBLEMS ON THIS QUESTIONNAIRE, HOW DIFFICULT HAVE THESE PROBLEMS MADE IT FOR YOU TO DO YOUR WORK, TAKE CARE OF THINGS AT HOME, OR GET ALONG WITH OTHER PEOPLE: VERY DIFFICULT
4. TROUBLE RELAXING: SEVERAL DAYS
3. WORRYING TOO MUCH ABOUT DIFFERENT THINGS: MORE THAN HALF THE DAYS
8. IF YOU CHECKED OFF ANY PROBLEMS, HOW DIFFICULT HAVE THESE MADE IT FOR YOU TO DO YOUR WORK, TAKE CARE OF THINGS AT HOME, OR GET ALONG WITH OTHER PEOPLE?: VERY DIFFICULT
GAD7 TOTAL SCORE: 10
7. FEELING AFRAID AS IF SOMETHING AWFUL MIGHT HAPPEN: SEVERAL DAYS
7. FEELING AFRAID AS IF SOMETHING AWFUL MIGHT HAPPEN: SEVERAL DAYS
5. BEING SO RESTLESS THAT IT IS HARD TO SIT STILL: NOT AT ALL
GAD7 TOTAL SCORE: 10

## 2022-10-04 ASSESSMENT — PATIENT HEALTH QUESTIONNAIRE - PHQ9
SUM OF ALL RESPONSES TO PHQ QUESTIONS 1-9: 10
SUM OF ALL RESPONSES TO PHQ QUESTIONS 1-9: 10
10. IF YOU CHECKED OFF ANY PROBLEMS, HOW DIFFICULT HAVE THESE PROBLEMS MADE IT FOR YOU TO DO YOUR WORK, TAKE CARE OF THINGS AT HOME, OR GET ALONG WITH OTHER PEOPLE: VERY DIFFICULT

## 2022-10-05 ENCOUNTER — OFFICE VISIT (OUTPATIENT)
Dept: FAMILY MEDICINE | Facility: CLINIC | Age: 82
End: 2022-10-05
Payer: COMMERCIAL

## 2022-10-05 VITALS
RESPIRATION RATE: 16 BRPM | TEMPERATURE: 97.5 F | DIASTOLIC BLOOD PRESSURE: 64 MMHG | WEIGHT: 138 LBS | OXYGEN SATURATION: 97 % | BODY MASS INDEX: 23.5 KG/M2 | SYSTOLIC BLOOD PRESSURE: 122 MMHG | HEART RATE: 74 BPM

## 2022-10-05 DIAGNOSIS — F33.1 MAJOR DEPRESSIVE DISORDER, RECURRENT, MODERATE (H): Primary | ICD-10-CM

## 2022-10-05 DIAGNOSIS — Z12.11 SCREEN FOR COLON CANCER: ICD-10-CM

## 2022-10-05 DIAGNOSIS — F41.1 ANXIETY STATE: ICD-10-CM

## 2022-10-05 PROCEDURE — 99214 OFFICE O/P EST MOD 30 MIN: CPT | Performed by: FAMILY MEDICINE

## 2022-10-05 RX ORDER — ESCITALOPRAM OXALATE 10 MG/1
10 TABLET ORAL DAILY
Qty: 60 TABLET | Refills: 0 | Status: SHIPPED | OUTPATIENT
Start: 2022-10-05 | End: 2022-11-16

## 2022-10-05 ASSESSMENT — PATIENT HEALTH QUESTIONNAIRE - PHQ9
SUM OF ALL RESPONSES TO PHQ QUESTIONS 1-9: 10
10. IF YOU CHECKED OFF ANY PROBLEMS, HOW DIFFICULT HAVE THESE PROBLEMS MADE IT FOR YOU TO DO YOUR WORK, TAKE CARE OF THINGS AT HOME, OR GET ALONG WITH OTHER PEOPLE: VERY DIFFICULT

## 2022-10-05 ASSESSMENT — PAIN SCALES - GENERAL: PAINLEVEL: MODERATE PAIN (4)

## 2022-10-05 ASSESSMENT — ANXIETY QUESTIONNAIRES: GAD7 TOTAL SCORE: 10

## 2022-10-05 NOTE — Clinical Note
Please schedule pt for follow-up anxiety/MDD office visit on 11/16/22 at 11:20am slot (knows to arrive at 11am). Thank you! CW

## 2022-10-05 NOTE — PROGRESS NOTES
Assessment & Plan       ICD-10-CM    1. Major depressive disorder, recurrent, moderate (H)  F33.1 escitalopram (LEXAPRO) 10 MG tablet   2. Anxiety state  F41.1 escitalopram (LEXAPRO) 10 MG tablet   3. Screen for colon cancer  Z12.11       Worsening mood/anxiety for the last few months, likely due to increased stressors with 's stroke in 7/22, and her recent wrist fracture.  Lower appetite she thinks due to the stress- wt down a bit.  Has been stable on lexapro 5mg/d for awhile.  Discussed options, will increase lexapro to 10mg/d.  Risks and benefits of medication(s) including potential side effects reviewed with patient.  Questions answered.     Return in about 6 weeks (around 11/16/2022) for Depression, Anxiety (11am arrival for 11:20am appointment).    Roberta Delong MD  [facility]            Gema Bravo is a 81 year old, presenting for the following health issues:  Depression      History of Present Illness       Mental Health Follow-up:  Patient presents to follow-up on Depression & Anxiety.Patient's depression since last visit has been:  Worse  The patient is having other symptoms associated with depression.  Patient's anxiety since last visit has been:  Worse  The patient is having other symptoms associated with anxiety.  Any significant life events: health concerns  Patient is feeling anxious or having panic attacks.  Patient has no concerns about alcohol or drug use.    She eats 2-3 servings of fruits and vegetables daily.She consumes 1 sweetened beverage(s) daily.She exercises with enough effort to increase her heart rate 30 to 60 minutes per day.  She exercises with enough effort to increase her heart rate 4 days per week.   She is taking medications regularly.    Today's PHQ-9         PHQ-9 Total Score: 10    PHQ-9 Q9 Thoughts of better off dead/self-harm past 2 weeks :   Not at all    How difficult have these problems made it for you to do your work, take  care of things at home, or get along with other people: Very difficult  Today's SHARMAINE-7 Score: 10       Depression Followup    How are you doing with your depression since your last visit? Worsened     Are you having other symptoms that might be associated with depression? No    Have you had a significant life event?  Health Concerns     Are you feeling anxious or having panic attacks?   Yes:  yes    Do you have any concerns with your use of alcohol or other drugs? No    Worsening mood and low energy since 's stroke in 7/22.  Worrying more as well.    Wondering if she can handle it.  She is driving now, he used to drive.  Around the house- cooking and cleaning.  Lives in a house.  They have a yard- he is mowing their small yard.      She also had left wrist fx- 2 wks after his stroke.    His cares...  Functioning okay.  Main issue is his sight, has some blind spots.  He is getting OT.    No family in town.   Son is in [place] and other son is in [place] (saw him in June).  Son in [place] had already planned to be home after his stroke.  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    Sleeping well.  Appetite isn't the greatest.  Not really enjoying food right now.    Social History     Tobacco Use     Smoking status: Never Smoker     Smokeless tobacco: Never Used   Substance Use Topics     Alcohol use: Never     Alcohol/week: 0.0 standard drinks     Comment: on occ, glass wine/wk     Drug use: Never     PHQ 8/9/2021 5/23/2022 10/4/2022   PHQ-9 Total Score 0 1 10   Q9: Thoughts of better off dead/self-harm past 2 weeks Not at all Not at all Not at all     SHARMAINE-7 SCORE 8/9/2021 5/23/2022 10/4/2022   Total Score 3 (minimal anxiety) 4 (minimal anxiety) 10 (moderate anxiety)   Total Score 3 4 10         Review of Systems   Constitutional, HEENT, cardiovascular, pulmonary, gi and gu systems are negative, except as otherwise noted.      Objective    Resp 16   Wt 62.6 kg (138 lb)   BMI 23.50 kg/m    Body mass index is 23.5 kg/m .  Physical  Exam   GENERAL APPEARANCE: healthy, alert and no distress     EYES: sclera clear, EOMI     RESP: lungs clear to auscultation - no rales, rhonchi or wheezes     CV: regular rates and rhythm, normal S1 S2, no S3 or S4 and no murmur, click or rub      Ext: warm, dry, no edema        Psych: full range affect, normal speech and grooming, judgement and insight intact

## 2022-10-24 DIAGNOSIS — S52.532D CLOSED COLLES' FRACTURE OF LEFT RADIUS WITH ROUTINE HEALING, SUBSEQUENT ENCOUNTER: Primary | ICD-10-CM
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
## 2022-10-25 ENCOUNTER — THERAPY VISIT (OUTPATIENT)
Dept: OCCUPATIONAL THERAPY | Facility: CLINIC | Age: 82
End: 2022-10-25
Attending: FAMILY MEDICINE
Payer: COMMERCIAL

## 2022-10-25 ENCOUNTER — ANCILLARY PROCEDURE (OUTPATIENT)
Dept: GENERAL RADIOLOGY | Facility: CLINIC | Age: 82
End: 2022-10-25
Attending: FAMILY MEDICINE
Payer: COMMERCIAL

## 2022-10-25 ENCOUNTER — OFFICE VISIT (OUTPATIENT)
Dept: ORTHOPEDICS | Facility: CLINIC | Age: 82
End: 2022-10-25
Payer: COMMERCIAL

## 2022-10-25 DIAGNOSIS — S52.502D TRAUMATIC CLOSED DISPLACED FRACTURE OF DISTAL END OF RADIUS, LEFT, WITH ROUTINE HEALING, SUBSEQUENT ENCOUNTER: ICD-10-CM

## 2022-10-25 DIAGNOSIS — S52.532D CLOSED COLLES' FRACTURE OF LEFT RADIUS WITH ROUTINE HEALING, SUBSEQUENT ENCOUNTER: ICD-10-CM

## 2022-10-25 DIAGNOSIS — S52.502D TRAUMATIC CLOSED DISPLACED FRACTURE OF DISTAL END OF RADIUS, LEFT, WITH ROUTINE HEALING, SUBSEQUENT ENCOUNTER: Primary | ICD-10-CM

## 2022-10-25 DIAGNOSIS — M25.532 LEFT WRIST PAIN: ICD-10-CM

## 2022-10-25 DIAGNOSIS — M25.632 WRIST STIFFNESS, LEFT: ICD-10-CM

## 2022-10-25 PROCEDURE — 99213 OFFICE O/P EST LOW 20 MIN: CPT | Performed by: FAMILY MEDICINE

## 2022-10-25 PROCEDURE — 97760 ORTHOTIC MGMT&TRAING 1ST ENC: CPT | Mod: GO

## 2022-10-25 PROCEDURE — 97110 THERAPEUTIC EXERCISES: CPT | Mod: GO

## 2022-10-25 PROCEDURE — 97165 OT EVAL LOW COMPLEX 30 MIN: CPT | Mod: GO

## 2022-10-25 PROCEDURE — 73110 X-RAY EXAM OF WRIST: CPT | Mod: LT | Performed by: RADIOLOGY

## 2022-10-25 NOTE — PROGRESS NOTES
[facility]    OUTPATIENT Occupational Therapy ORTHOPEDIC EVALUATION  PLAN OF TREATMENT FOR OUTPATIENT REHABILITATION  (COMPLETE FOR INITIAL CLAIMS ONLY)  Patient's Last Name, First Name, M.I.  YOB: 1940  Lawrence Pemberton    Provider s Name:  [facility]   Medical Record No.  [id number]   Start of Care Date:  10/25/22   Onset Date:   09/29/22 (script date)   Treatment Diagnosis:  Traumatic closed displaced fracture of distal end of left radius Medical Diagnosis:  Traumatic closed displaced fracture of distal end of radius, left, with routine healing, subsequent encounter       Goals:     10/25/22 0500   Goal #1   Goal #1 hygiene   Previous Performance Level Independent   Current Functional Task Other - on additional line   Other Hygiene wash   Current Performance Level 3/10 pain   STG Target Performance Hair   STG Target Perform Level 1/10 pain   Due Date 11/22/22   LTG Target Task/Performance Pain free hygiene/grooming   Due Date 12/20/22       Therapy Frequency:  1x/week  Predicted Duration of Therapy Intervention:  8 weeks    Isaac Wright, OTR                 I CERTIFY THE NEED FOR THESE SERVICES FURNISHED UNDER        THIS PLAN OF TREATMENT AND WHILE UNDER MY CARE     (Physician attestation of this document indicates review and certification of the therapy plan).                     Certification Date From:  10/25/22   Certification Date To:  12/20/22    Referring Provider:  Eleno Mae    Initial Assessment        See Epic Evaluation SOC Date: 10/25/22

## 2022-10-25 NOTE — PROGRESS NOTES
ESTABLISHED PATIENT FOLLOW-UP:  RECHECK (Left wrist/)       HISTORY OF PRESENT ILLNESS  Ms. Pemberton is a pleasant 81 year old year old female who presents to clinic today for follow-up of left wrist distal radius comminuted displaced fracture suffered on 8/22/22    Date of injury: 8/22/22  Date last seen: 10/5/22  Following Therapeutic Plan: Yes  Pain: NO pain in cast, currently 3/10 with motion  Function: NA in cast until today  Interval History: Lawrence has an OT visit scheduled to follow our visit today.  Goals include IADLs, household tasks.     Additional medical/Social/Surgical histories reviewed in [facility] and updated as appropriate.    REVIEW OF SYSTEMS (10/25/2022)  CONSTITUTIONAL: Denies fever and weight loss  GASTROINTESTINAL: Denies abdominal pain, nausea, vomiting  MUSCULOSKELETAL: See HPI  SKIN: Denies any recent rash or lesion  NEUROLOGICAL: Denies numbness or focal weakness     PHYSICAL EXAM  There were no vitals taken for this visit.    ESTABLISHED PATIENT FOLLOW-UP:  RECHECK (Left wrist/)       HISTORY OF PRESENT ILLNESS  Ms. Pemberton is a pleasant 81 year old year old female who presents to clinic today for follow-up of Left distal radius fx    Date of injury: 8/22/22  Date last seen: 9/6/22  Following Therapeutic Plan: Cast  Pain: Improved  Function: Improved  Interval History: Pt notes continued decrease in pain since the previous visit     Additional medical/Social/Surgical histories reviewed in [facility] and updated as appropriate.    REVIEW OF SYSTEMS (9/23/2022)  CONSTITUTIONAL: Denies fever and weight loss  GASTROINTESTINAL: Denies abdominal pain, nausea, vomiting  MUSCULOSKELETAL: See HPI  SKIN: Denies any recent rash or lesion  NEUROLOGICAL: Denies numbness or focal weakness     PHYSICAL EXAM  There were no vitals taken for this visit.      General  - normal appearance, in no obvious distress  Cardiovascular  - 2+ distal radius pulse  - Capillary refill <2s  Musculoskeletal - right wrist  -  inspection:  mild deformity at dorsum of distal radius.Swelling mild.  - palpation: Resolved bony tenderness at distal radius, no tenderness at the anatomical snuffbox  - ROM: 45 degrees extension, 10 degrees wrist flexion, near full radial and ulnar deviation.  Supination less 10 degrees.  - strength: Grossly intact  strength, thumb extension against resistance.   - special tests: Deferred  Neuro  - no sensory or motor deficit  Skin  -Resolved ecchymosis at distal radius, no erythema, warmth, or induration, no obvious rash, skin intact  Psych  - interactive, appropriate, normal mood and affect      IMAGING : XR wrist left 3V. Final results and radiologist's interpretation, available in the [facility] health record. Images were reviewed with the patient/family members in the office today. My personal interpretation of the performed imaging is interval healing and bony callus formation at distal radius.     ASSESSMENT & PLAN  Ms. Pemberton is a 81 year old year old female who presents to clinic today with RECHECK (Left wrist/)    X-rays reveal robust healing and demonstrates readiness to progress out of the cast today.    Diagnosis:  (S52.502D) Traumatic closed displaced fracture of distal end of radius, left, with routine healing, subsequent encounter  (primary encounter diagnosis)    -Cast removal today, now 9 weeks from injury  -OT to start today; zipper splint and may introduce ROM exercises and gradual strengthening as tolerated  -Understands likelihood of persistent stiffness however goal of IADLs should be achievable with OT.  -Follow up in 4 weeks to review progress.    It was a pleasure seeing Lawrence.    Eleno Mae DO, CAQSM  Primary Care Sports Medicine

## 2022-10-25 NOTE — NURSING NOTE
Cast removal:    Relevant Diagnosis: Traumatic closed displaced fracture of distal end of radius, left    Patient educated on cast removal process: Yes     Short arm cast was removed per physician instruction.    Skin was observed and found to be intact with no signs of concern:Yes     Concern noted: None     Person(s) involved in removal:   Patient     Questions asked: None    Patient sent to x-ray: Yes

## 2022-10-25 NOTE — LETTER
10/25/2022      RE: Lawrence Pemberton  [address] [place] [address]     Dear Colleague,    Thank you for referring your patient, Lawrence Pemberton, to the [facility] [place]. Please see a copy of my visit note below.    ESTABLISHED PATIENT FOLLOW-UP:  RECHECK (Left wrist/)       HISTORY OF PRESENT ILLNESS  Ms. Pemberton is a pleasant 81 year old year old female who presents to clinic today for follow-up of left wrist distal radius comminuted displaced fracture suffered on 8/22/22    Date of injury: 8/22/22  Date last seen: 10/5/22  Following Therapeutic Plan: Yes  Pain: NO pain in cast, currently 3/10 with motion  Function: NA in cast until today  Interval History: Lawrence has an OT visit scheduled to follow our visit today.  Goals include IADLs, household tasks.     Additional medical/Social/Surgical histories reviewed in [facility] and updated as appropriate.    REVIEW OF SYSTEMS (10/25/2022)  CONSTITUTIONAL: Denies fever and weight loss  GASTROINTESTINAL: Denies abdominal pain, nausea, vomiting  MUSCULOSKELETAL: See HPI  SKIN: Denies any recent rash or lesion  NEUROLOGICAL: Denies numbness or focal weakness     PHYSICAL EXAM  There were no vitals taken for this visit.    ESTABLISHED PATIENT FOLLOW-UP:  RECHECK (Left wrist/)       HISTORY OF PRESENT ILLNESS  Ms. Pemberton is a pleasant 81 year old year old female who presents to clinic today for follow-up of Left distal radius fx    Date of injury: 8/22/22  Date last seen: 9/6/22  Following Therapeutic Plan: Cast  Pain: Improved  Function: Improved  Interval History: Pt notes continued decrease in pain since the previous visit     Additional medical/Social/Surgical histories reviewed in [facility] and updated as appropriate.    REVIEW OF SYSTEMS (9/23/2022)  CONSTITUTIONAL: Denies fever and weight loss  GASTROINTESTINAL: Denies abdominal pain, nausea, vomiting  MUSCULOSKELETAL: See HPI  SKIN: Denies any recent rash or  lesion  NEUROLOGICAL: Denies numbness or focal weakness     PHYSICAL EXAM  There were no vitals taken for this visit.      General  - normal appearance, in no obvious distress  Cardiovascular  - 2+ distal radius pulse  - Capillary refill <2s  Musculoskeletal - right wrist  - inspection:  mild deformity at dorsum of distal radius.Swelling mild.  - palpation: Resolved bony tenderness at distal radius, no tenderness at the anatomical snuffbox  - ROM: 45 degrees extension, 10 degrees wrist flexion, near full radial and ulnar deviation.  Supination less 10 degrees.  - strength: Grossly intact  strength, thumb extension against resistance.   - special tests: Deferred  Neuro  - no sensory or motor deficit  Skin  -Resolved ecchymosis at distal radius, no erythema, warmth, or induration, no obvious rash, skin intact  Psych  - interactive, appropriate, normal mood and affect      IMAGING : XR wrist left 3V. Final results and radiologist's interpretation, available in the [facility] health record. Images were reviewed with the patient/family members in the office today. My personal interpretation of the performed imaging is interval healing and bony callus formation at distal radius.     ASSESSMENT & PLAN  Ms. Pemberton is a 81 year old year old female who presents to clinic today with RECHECK (Left wrist/)    X-rays reveal robust healing and demonstrates readiness to progress out of the cast today.    Diagnosis:  (S52.756D) Traumatic closed displaced fracture of distal end of radius, left, with routine healing, subsequent encounter  (primary encounter diagnosis)    -Cast removal today, now 9 weeks from injury  -OT to start today; zipper splint and may introduce ROM exercises and gradual strengthening as tolerated  -Understands likelihood of persistent stiffness however goal of IADLs should be achievable with OT.  -Follow up in 4 weeks to review progress.    It was a pleasure seeing Lawrence.    Eleno Mae DO, CAM  Primary Care  Sports Medicine

## 2022-11-10 ENCOUNTER — THERAPY VISIT (OUTPATIENT)
Dept: OCCUPATIONAL THERAPY | Facility: CLINIC | Age: 82
End: 2022-11-10
Payer: COMMERCIAL

## 2022-11-10 DIAGNOSIS — M25.632 WRIST STIFFNESS, LEFT: ICD-10-CM

## 2022-11-10 DIAGNOSIS — M25.532 LEFT WRIST PAIN: Primary | ICD-10-CM

## 2022-11-10 PROCEDURE — 97110 THERAPEUTIC EXERCISES: CPT | Mod: GO | Performed by: OCCUPATIONAL THERAPIST

## 2022-11-10 PROCEDURE — 97018 PARAFFIN BATH THERAPY: CPT | Mod: GO | Performed by: OCCUPATIONAL THERAPIST

## 2022-11-14 NOTE — PROGRESS NOTES
SOAP Note Objective Information for 11/18/2022    Diagnosis: Traumatic closed displaced fracture of distal end of left radius   DOI: 9/29/22 (script date); referred by Eleno Victoria; injured on 8/22/22 after falling backwards  Procedure:  Cast from 8/23/22 to 10/25/22  Post:  12w 4d     Per MD visit on 9/23/22, patient to begin OT to address wrist stiffness; discussed given ulnar positive variance and impacted nature, residual stiffness or loss of ROM may persist long-term; left zipper splint after casting      Per MD visit on 10/25/22,   -Cast removal today, now 8 weeks from injury  -OT to start today; zipper splint and may introduce ROM exercises and gradual strengthening as tolerated  -Follow up in 4 weeks    Pain Level (Scale 0-10)   10/25/2022 11/10/22 11/18/22   At Rest 0/10 0/10    With Use 3/10  4/10      Pain Description  Date 10/25/2022   Location Left wrist   Pain Quality Aching and Dull   Frequency intermittent or constant     Pain is worst  daytime or nighttime   Exacerbated by  with use and movement    Relieved by none   Progression Unchanged      ROM  Pain Report:  -none + mild    ++ moderate    +++ severe     Wrist 10/25/2022 10/25/2022 11/10/22 11/18/22   AROM (PROM) R L L L   Extension 52 48 55 pre  65 post 65 pre  70 post   Flexion 40 12 18 pre  26 post 25 pre  30 post   RD 15 10 20 pre     20 post   UD 28 40 30 pre     30 post   Supination 78 68 75 pre     78 post   Pronation WFL WFL WNL WNL     Strength   (Measured in pounds)  Pain Report: - none  + mild    ++ moderate    +++ severe    11/18/2022 11/18/2022   Trials Right Left   1  2  3 49 20-   Average 49 20     Lat Pinch 11/18/2022 11/18/2022   Trials Right Left   1  2  3 12 9-   Average 12 9     3 Pt Pinch 11/18/2022 11/18/2022   Trials Right Left   1  2  3 11 10-   Average 11 10     Home Program:   Orthosis Wear and Care  Wrist Active Range of Motion Extension and Flexion Combined  Active Range of Motion Combined Radial and Ulnar  Deviation  Forearm Active Range of Motion Combined Pronation and Supination  Finger Active Range of Motion Tendon Glides Fist Series  Thumb Active Range of Motion Opposition  11/10/22  Epsom salt soaks  Compression sleeve for edema  PROM wrist - flexion and extension  11/18/22   with stress ball/wash cloth    Next Visit:  Check response to strength, progress to isometrics  Paraffin  A/PROM  STM     Please refer to the daily flowsheet for treatment today, total treatment time and time spent performing 1:1 timed codes.

## 2022-11-16 ENCOUNTER — VIRTUAL VISIT (OUTPATIENT)
Dept: FAMILY MEDICINE | Facility: CLINIC | Age: 82
End: 2022-11-16
Payer: COMMERCIAL

## 2022-11-16 DIAGNOSIS — F33.1 MAJOR DEPRESSIVE DISORDER, RECURRENT, MODERATE (H): ICD-10-CM

## 2022-11-16 DIAGNOSIS — F41.1 ANXIETY STATE: ICD-10-CM

## 2022-11-16 PROCEDURE — 99213 OFFICE O/P EST LOW 20 MIN: CPT | Mod: 95 | Performed by: FAMILY MEDICINE

## 2022-11-16 RX ORDER — ESCITALOPRAM OXALATE 10 MG/1
10 TABLET ORAL DAILY
Qty: 60 TABLET | Refills: 1 | Status: SHIPPED | OUTPATIENT
Start: 2022-11-16 | End: 2023-05-23

## 2022-11-16 ASSESSMENT — PATIENT HEALTH QUESTIONNAIRE - PHQ9
SUM OF ALL RESPONSES TO PHQ QUESTIONS 1-9: 5
10. IF YOU CHECKED OFF ANY PROBLEMS, HOW DIFFICULT HAVE THESE PROBLEMS MADE IT FOR YOU TO DO YOUR WORK, TAKE CARE OF THINGS AT HOME, OR GET ALONG WITH OTHER PEOPLE: SOMEWHAT DIFFICULT
SUM OF ALL RESPONSES TO PHQ QUESTIONS 1-9: 5

## 2022-11-16 ASSESSMENT — ANXIETY QUESTIONNAIRES
7. FEELING AFRAID AS IF SOMETHING AWFUL MIGHT HAPPEN: SEVERAL DAYS
GAD7 TOTAL SCORE: 4
2. NOT BEING ABLE TO STOP OR CONTROL WORRYING: NOT AT ALL
7. FEELING AFRAID AS IF SOMETHING AWFUL MIGHT HAPPEN: SEVERAL DAYS
4. TROUBLE RELAXING: NOT AT ALL
6. BECOMING EASILY ANNOYED OR IRRITABLE: SEVERAL DAYS
GAD7 TOTAL SCORE: 4
GAD7 TOTAL SCORE: 4
3. WORRYING TOO MUCH ABOUT DIFFERENT THINGS: SEVERAL DAYS
5. BEING SO RESTLESS THAT IT IS HARD TO SIT STILL: NOT AT ALL
1. FEELING NERVOUS, ANXIOUS, OR ON EDGE: SEVERAL DAYS

## 2022-11-16 NOTE — PROGRESS NOTES
Lawrence is a 81 year old who is being evaluated via a billable telephone visit.      What phone number would you like to be contacted at? [phone]  How would you like to obtain your AVS? MyChart    Assessment & Plan       ICD-10-CM    1. Major depressive disorder, recurrent, moderate (H)  F33.1 escitalopram (LEXAPRO) 10 MG tablet      2. Anxiety state  F41.1 escitalopram (LEXAPRO) 10 MG tablet         MDD/anxiety-   Sx's improved with increase in lexapro from 5mg/d to 10mg/d.  Still with frustrations in health issues ('s stroke, her wrist fx, difficulty driving with  unable), but managing frustrations okay.  No se's.  Will continue lexapro 10mg/d. Refills sent x 6 months.  Follow-up in 6 months with Wellness visit and MDD/Anxiety follow-up- reach out if she'll run out of medication prior.     Return in about 6 months (around 5/16/2023) for Preventive/Wellness Visit, Depression, Anxiety.      Roberta Delong MD  [facility]   Lawrence is a 81 year old, presenting for the following health issues:   Follow Up (Anxiety )      History of Present Illness       Mental Health Follow-up:  Patient presents to follow-up on Anxiety.    Patient's anxiety since last visit has been:  Better  The patient is not having other symptoms associated with anxiety.  Any significant life events: No  Patient is not feeling anxious or having panic attacks.  Patient has no concerns about alcohol or drug use.    She eats 2-3 servings of fruits and vegetables daily.She consumes 0 sweetened beverage(s) daily.She exercises with enough effort to increase her heart rate 30 to 60 minutes per day.  She exercises with enough effort to increase her heart rate 3 or less days per week.   She is taking medications regularly.    Today's PHQ-9         PHQ-9 Total Score: 5    PHQ-9 Q9 Thoughts of better off dead/self-harm past 2 weeks :   Not at all    How difficult have these problems made it  for you to do your work, take care of things at home, or get along with other people: Somewhat difficult  Today's SHARMAINE-7 Score: 4     Last visit on 10/5/22.  Increased lexapro to 10mg/d from 5mg/d.  Does seem to help significantly, and noticed the change almost right away.  Took the edge off, helpful.    All in all, doing okay.    Little frustrations, with  having a stroke (7/28/22, physically a little unsteady, mentally seems to act mostly normal, but some things are different, gets frustrated.  Vision was most affected, blind spots, hard time reading), and she broke her wrist.  More stuck at home.  She's the only one who can drive, taking him to therapy.  She doesn't like to drive much, wary of being on the highway, takes the back roads which is better.  Getting some rides with friends.    Wrist- using splint on/off, and doing PT.  Making some progress.      Review of Systems   Constitutional, HEENT, cardiovascular, pulmonary, gi and gu systems are negative, except as otherwise noted.        Objective       Vitals:  No vitals were obtained today due to virtual visit.    Physical Exam   healthy, alert and no distress  PSYCH: Alert and oriented times 3; coherent speech, normal   rate and volume, able to articulate logical thoughts, able   to abstract reason, no tangential thoughts, no hallucinations   or delusions  Her affect is normal  RESP: No cough, no audible wheezing, able to talk in full sentences  Remainder of exam unable to be completed due to telephone visits    Office Visit on 05/24/2022   Component Date Value Ref Range Status     Cholesterol 05/24/2022 163  <200 mg/dL Final     Triglycerides 05/24/2022 78  <150 mg/dL Final     Direct Measure HDL 05/24/2022 57  >=50 mg/dL Final     LDL Cholesterol Calculated 05/24/2022 90  <=100 mg/dL Final     Non HDL Cholesterol 05/24/2022 106  <130 mg/dL Final     Patient Fasting > 8hrs? 05/24/2022 Yes   Final     Sodium 05/24/2022 139  133 - 144 mmol/L Final      Potassium 05/24/2022 4.1  3.4 - 5.3 mmol/L Final     Chloride 05/24/2022 108  94 - 109 mmol/L Final     Carbon Dioxide (CO2) 05/24/2022 25  20 - 32 mmol/L Final     Anion Gap 05/24/2022 6  3 - 14 mmol/L Final     Urea Nitrogen 05/24/2022 19  7 - 30 mg/dL Final     Creatinine 05/24/2022 0.83  0.52 - 1.04 mg/dL Final     Calcium 05/24/2022 8.8  8.5 - 10.1 mg/dL Final     Glucose 05/24/2022 86  70 - 99 mg/dL Final     GFR Estimate 05/24/2022 70  >60 mL/min/1.73m2 Final    Effective December 21, 2021 eGFRcr in adults is calculated using the 2021 CKD-EPI creatinine equation which includes age and gender (Lesa saunders al., NEJM, DOI: 10.1056/CIEPbq0900091)     Vitamin D, Total (25-Hydroxy) 05/24/2022 34  20 - 75 ug/L Final               Phone call duration: 9 minutes

## 2022-11-18 ENCOUNTER — THERAPY VISIT (OUTPATIENT)
Dept: OCCUPATIONAL THERAPY | Facility: CLINIC | Age: 82
End: 2022-11-18
Payer: COMMERCIAL

## 2022-11-18 DIAGNOSIS — M25.532 LEFT WRIST PAIN: Primary | ICD-10-CM

## 2022-11-18 DIAGNOSIS — M25.632 WRIST STIFFNESS, LEFT: ICD-10-CM

## 2022-11-18 PROCEDURE — 97140 MANUAL THERAPY 1/> REGIONS: CPT | Mod: GO | Performed by: OCCUPATIONAL THERAPIST

## 2022-11-18 PROCEDURE — 97018 PARAFFIN BATH THERAPY: CPT | Mod: GO | Performed by: OCCUPATIONAL THERAPIST

## 2022-11-18 PROCEDURE — 97110 THERAPEUTIC EXERCISES: CPT | Mod: GO | Performed by: OCCUPATIONAL THERAPIST

## 2022-11-21 NOTE — PROGRESS NOTES
SOAP Note Objective Information for 11/25/2022    Diagnosis: Traumatic closed displaced fracture of distal end of left radius   DOI: 9/29/22 (script date); referred by Eleno Victoria; injured on 8/22/22 after falling backwards  Procedure:  Cast from 8/23/22 to 10/25/22  Post:  13w 4d     Per MD visit on 9/23/22, patient to begin OT to address wrist stiffness; discussed given ulnar positive variance and impacted nature, residual stiffness or loss of ROM may persist long-term; left zipper splint after casting      Per MD visit on 10/25/22,   -Cast removal today, now 8 weeks from injury  -OT to start today; zipper splint and may introduce ROM exercises and gradual strengthening as tolerated  -Follow up in 4 weeks    Pain Level (Scale 0-10)   10/25/2022 11/10/22 11/25/22   At Rest 0/10 0/10 0/10   With Use 3/10  4/10 3-4/10     Pain Description  Date 10/25/2022   Location Left wrist   Pain Quality Aching and Dull   Frequency intermittent or constant     Pain is worst  daytime or nighttime   Exacerbated by  with use and movement    Relieved by none   Progression Unchanged      ROM  Pain Report:  -none + mild    ++ moderate    +++ severe     Wrist 10/25/2022 10/25/2022 11/10/22 11/18/22 11/25/22   AROM (PROM) R L L L L   Extension 52 48 55 pre  65 post 65 pre  70 post 67 pre  71 post   Flexion 40 12 18 pre  26 post 25 pre  30 post 22 pre  30 post   RD 15 10 20 pre     20 post      UD 28 40 30 pre     30 post    Supination 78 68 75 pre     78 post    Pronation WFL WFL WNL WNL      Strength   (Measured in pounds)  Pain Report: - none  + mild    ++ moderate    +++ severe    11/18/2022 11/18/2022 11/25/22   Trials Right Left Left   1  2  3 49 20- 24-   Average 49 20 24     Lat Pinch 11/18/2022 11/18/2022   Trials Right Left   1  2  3 12 9-   Average 12 9     3 Pt Pinch 11/18/2022 11/18/2022   Trials Right Left   1  2  3 11 10-   Average 11 10     Home Program:   Orthosis Wear and Care  Wrist Active Range of Motion  Extension and Flexion Combined  Active Range of Motion Combined Radial and Ulnar Deviation  Forearm Active Range of Motion Combined Pronation and Supination  Finger Active Range of Motion Tendon Glides Fist Series  Thumb Active Range of Motion Opposition  11/10/22  Epsom salt soaks  Compression sleeve for edema  PROM wrist - flexion and extension  11/18/22   with stress ball/wash cloth  1125/22  Wrist isometric strengthening    Next Visit:  See how MD visit went  Check response to strength, progress to isotonics   Paraffin  A/PROM  STM     Please refer to the daily flowsheet for treatment today, total treatment time and time spent performing 1:1 timed codes.

## 2022-11-25 ENCOUNTER — THERAPY VISIT (OUTPATIENT)
Dept: OCCUPATIONAL THERAPY | Facility: CLINIC | Age: 82
End: 2022-11-25
Payer: COMMERCIAL

## 2022-11-25 DIAGNOSIS — M25.632 WRIST STIFFNESS, LEFT: ICD-10-CM

## 2022-11-25 DIAGNOSIS — M25.532 LEFT WRIST PAIN: Primary | ICD-10-CM

## 2022-11-25 PROCEDURE — 97018 PARAFFIN BATH THERAPY: CPT | Mod: GO | Performed by: OCCUPATIONAL THERAPIST

## 2022-11-25 PROCEDURE — 97110 THERAPEUTIC EXERCISES: CPT | Mod: GO | Performed by: OCCUPATIONAL THERAPIST

## 2022-11-25 PROCEDURE — 97140 MANUAL THERAPY 1/> REGIONS: CPT | Mod: GO | Performed by: OCCUPATIONAL THERAPIST

## 2023-01-10 ENCOUNTER — OFFICE VISIT (OUTPATIENT)
Dept: ORTHOPEDICS | Facility: CLINIC | Age: 83
End: 2023-01-10
Payer: COMMERCIAL

## 2023-01-10 ENCOUNTER — ANCILLARY PROCEDURE (OUTPATIENT)
Dept: GENERAL RADIOLOGY | Facility: CLINIC | Age: 83
End: 2023-01-10
Attending: FAMILY MEDICINE
Payer: COMMERCIAL

## 2023-01-10 VITALS — WEIGHT: 138 LBS | BODY MASS INDEX: 23.5 KG/M2

## 2023-01-10 DIAGNOSIS — S52.502D TRAUMATIC CLOSED DISPLACED FRACTURE OF DISTAL END OF RADIUS, LEFT, WITH ROUTINE HEALING, SUBSEQUENT ENCOUNTER: ICD-10-CM

## 2023-01-10 DIAGNOSIS — S52.502D TRAUMATIC CLOSED DISPLACED FRACTURE OF DISTAL END OF RADIUS, LEFT, WITH ROUTINE HEALING, SUBSEQUENT ENCOUNTER: Primary | ICD-10-CM

## 2023-01-10 PROCEDURE — 73110 X-RAY EXAM OF WRIST: CPT | Mod: LT | Performed by: RADIOLOGY

## 2023-01-10 PROCEDURE — 99213 OFFICE O/P EST LOW 20 MIN: CPT | Performed by: FAMILY MEDICINE

## 2023-01-10 NOTE — PROGRESS NOTES
ESTABLISHED PATIENT FOLLOW-UP:  RECHECK of the Left Wrist       HISTORY OF PRESENT ILLNESS  Ms. Pemberton is a pleasant 82 year old year old female who presents to clinic today for follow-up of left wrist.     Date of injury/onset: 8/22/2022  Date last seen: 10/25/2022  Following Therapeutic Plan: yes, has been attending hand therapy and completing HEP  Pain: improved  Function: improved  Interval History: She reports that she can feel some mild discomfort in her left wrist when lifting objects but it does not stop her with activity.  She otherwise notes no difficulty with IADLS.  She graduated from OT.  Mild swelling at wrist persists.    Review of Systems:    Do you have fever, chills, weight loss? No    Do you have any vision problems? Yes, reading glasses    Do you have any chest pain or edema? No    Do you have any shortness of breath or wheezing?  No    Do you have stomach problems? No    Do you have any numbness or focal weakness? No    Do you have diabetes? No    Do you have problems with bleeding or clotting? No    Do you have an rashes or other skin lesions? 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     Osteoarthritis     Osteopenia of both hips     Advanced directives, counseling/discussion     Basal cell carcinoma of skin of nose     Hyperlipidemia with target LDL less than 130     Degenerative scoliosis in adult patient     Sacroiliitis (H)     Pulmonary nodules     Postural imbalance     Left wrist pain     Traumatic closed displaced fracture of distal end of radius, left, with routine healing, subsequent encounter     Wrist stiffness, left       Current Outpatient Medications   Medication Sig Dispense Refill     alendronate (FOSAMAX) 35 MG tablet  Take 1 tablet (35 mg) by mouth every 7 days 12 tablet 2     atorvastatin (LIPITOR) 20 MG tablet Take 1 tablet (20 mg) by mouth daily 90 tablet 3     escitalopram (LEXAPRO) 10 MG tablet Take 1 tablet (10 mg) by mouth daily 60 tablet 1       Allergies   Allergen Reactions     No Known Drug Allergies        Family History   Problem Relation Age of Onset     Cancer Mother          of cancer at 80     Other Cancer Mother          from cancer at age 81     Cardiovascular Father          of heart attack at 69     Coronary Artery Disease Father          of heart attack at age 69     Neurologic Disorder Brother         fibromyalgia     Mental Illness Brother      Osteoporosis Paternal Grandmother        Additional medical/Social/Surgical histories [facility] in [facility] and updated as appropriate.       PHYSICAL EXAM  Wt 62.6 kg (138 lb)   BMI 23.50 kg/m      General  - normal appearance, in no obvious distress  Musculoskeletal - left wrist  - inspection: normal joint alignment, no obvious deformity, no swelling  - palpation: no bony or soft tissue tenderness, no tenderness at the anatomical snuffbox  - ROM:  70 deg flexion   30 deg extension   25 deg abduction   35 deg adduction  - strength: 5/5  strength, 5/5 flexion, extension, pronation, supination, adduction, abduction  Neuro  - no sensory or motor deficit, grossly normal coordination, normal muscle tone    IMAGING : XR wrist left 3V. Final results and radiologist's interpretation, available in the [facility] health record. Images were reviewed with the patient/family members in the office today. My personal interpretation of the performed imaging is ongoing healing of dorsally angulated impacted distal radius fracture with robust callus.  Ulnar positive variance.     ASSESSMENT & PLAN  Ms. Pemberton is a 82 year old year old female who presents to clinic today with RECHECK of the Left Wrist    She is quite happy with progress to date and OT has been helpful in  restoring her function, IADLS.    Diagnosis:  (K55.984Q) Traumatic closed displaced fracture of distal end of radius, left, with routine healing, subsequent encounter  (primary encounter diagnosis)    Discussed residual flexion deficit will likely persist based on fracture angulation. Able to maintain all IADLS with current level of motion which is reassuring. Continue daily stretching and HEP to minimize stiffness. Wrist brace PRN for any heavy lifting or achiness.  Follow up with me as needed only.    It was a pleasure seeing Lawrence.    Eleno Mae DO, CAQSM  Primary Care Sports Medicine

## 2023-01-10 NOTE — LETTER
1/10/2023      RE: Lawrence Pemberton  [address] [place] [address]     Dear Colleague,    Thank you for referring your patient, Lawrence Pemberton, to the [facility] [place]. Please see a copy of my visit note below.    ESTABLISHED PATIENT FOLLOW-UP:  RECHECK of the Left Wrist       HISTORY OF PRESENT ILLNESS  Ms. Pemberton is a pleasant 82 year old year old female who presents to clinic today for follow-up of left wrist.     Date of injury/onset: 8/22/2022  Date last seen: 10/25/2022  Following Therapeutic Plan: yes, has been attending hand therapy and completing HEP  Pain: improved  Function: improved  Interval History: She reports that she can feel some mild discomfort in her left wrist when lifting objects but it does not stop her with activity.  She otherwise notes no difficulty with IADLS.  She graduated from OT.  Mild swelling at wrist persists.    Review of Systems:    Do you have fever, chills, weight loss? No    Do you have any vision problems? Yes, reading glasses    Do you have any chest pain or edema? No    Do you have any shortness of breath or wheezing?  No    Do you have stomach problems? No    Do you have any numbness or focal weakness? No    Do you have diabetes? No    Do you have problems with bleeding or clotting? No    Do you have an rashes or other skin lesions? 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     Osteoarthritis     Osteopenia of both hips     Advanced directives, counseling/discussion     Basal cell carcinoma of skin of nose     Hyperlipidemia with target LDL less than 130     Degenerative scoliosis in adult patient     Sacroiliitis (H)     Pulmonary nodules     Postural imbalance      Left wrist pain     Traumatic closed displaced fracture of distal end of radius, left, with routine healing, subsequent encounter     Wrist stiffness, left       Current Outpatient Medications   Medication Sig Dispense Refill     alendronate (FOSAMAX) 35 MG tablet Take 1 tablet (35 mg) by mouth every 7 days 12 tablet 2     atorvastatin (LIPITOR) 20 MG tablet Take 1 tablet (20 mg) by mouth daily 90 tablet 3     escitalopram (LEXAPRO) 10 MG tablet Take 1 tablet (10 mg) by mouth daily 60 tablet 1       Allergies   Allergen Reactions     No Known Drug Allergies        Family History   Problem Relation Age of Onset     Cancer Mother          of cancer at 80     Other Cancer Mother          from cancer at age 81     Cardiovascular Father          of heart attack at 69     Coronary Artery Disease Father          of heart attack at age 69     Neurologic Disorder Brother         fibromyalgia     Mental Illness Brother      Osteoporosis Paternal Grandmother        Additional medical/Social/Surgical histories reviewed in [facility] and updated as appropriate.       PHYSICAL EXAM  Wt 62.6 kg (138 lb)   BMI 23.50 kg/m      General  - normal appearance, in no obvious distress  Musculoskeletal - left wrist  - inspection: normal joint alignment, no obvious deformity, no swelling  - palpation: no bony or soft tissue tenderness, no tenderness at the anatomical snuffbox  - ROM:  70 deg flexion   30 deg extension   25 deg abduction   35 deg adduction  - strength: 5/5  strength, 5/5 flexion, extension, pronation, supination, adduction, abduction  Neuro  - no sensory or motor deficit, grossly normal coordination, normal muscle tone    IMAGING : XR wrist left 3V. Final results and radiologist's interpretation, available in the [facility] health record. Images were reviewed with the patient/family members in the office today. My personal interpretation of the performed imaging is ongoing healing of dorsally angulated impacted  distal radius fracture with robust callus.  Ulnar positive variance.     ASSESSMENT & PLAN  Ms. Pemberton is a 82 year old year old female who presents to clinic today with RECHECK of the Left Wrist    She is quite happy with progress to date and OT has been helpful in restoring her function, IADLS.    Diagnosis:  (N08.705U) Traumatic closed displaced fracture of distal end of radius, left, with routine healing, subsequent encounter  (primary encounter diagnosis)    Discussed residual flexion deficit will likely persist based on fracture angulation. Able to maintain all IADLS with current level of motion which is reassuring. Continue daily stretching and HEP to minimize stiffness. Wrist brace PRN for any heavy lifting or achiness.  Follow up with me as needed only.    It was a pleasure seeing Lawrence.    Eleno Mae DO, CAQSM  Primary Care Sports Medicine

## 2023-03-29 ENCOUNTER — TELEPHONE (OUTPATIENT)
Dept: FAMILY MEDICINE | Facility: CLINIC | Age: 83
End: 2023-03-29
Payer: COMMERCIAL

## 2023-03-29 DIAGNOSIS — H91.93 BILATERAL HEARING LOSS, UNSPECIFIED HEARING LOSS TYPE: Primary | ICD-10-CM

## 2023-03-29 DIAGNOSIS — R09.81 NASAL CONGESTION: ICD-10-CM

## 2023-03-29 NOTE — TELEPHONE ENCOUNTER
Pt calling requesting another ENT referral as she states she did not follow up on one written in 2021 and would like to now.    Would like [facility] in 2021, [facility] [address]    Thanks!  Gaurav Jimenez RN   [facility]

## 2023-03-31 NOTE — TELEPHONE ENCOUNTER
Patient informed given Number to call if they do not call her   Betsy Hopson  Care Unit Coordinator

## 2023-04-24 ENCOUNTER — PATIENT OUTREACH (OUTPATIENT)
Dept: CARE COORDINATION | Facility: CLINIC | Age: 83
End: 2023-04-24
Payer: COMMERCIAL

## 2023-04-25 ENCOUNTER — TRANSFERRED RECORDS (OUTPATIENT)
Dept: HEALTH INFORMATION MANAGEMENT | Facility: CLINIC | Age: 83
End: 2023-04-25
Payer: COMMERCIAL

## 2023-05-08 ENCOUNTER — PATIENT OUTREACH (OUTPATIENT)
Dept: CARE COORDINATION | Facility: CLINIC | Age: 83
End: 2023-05-08
Payer: COMMERCIAL

## 2023-05-08 DIAGNOSIS — M85.851 OSTEOPENIA OF BOTH HIPS: ICD-10-CM

## 2023-05-08 DIAGNOSIS — M85.852 OSTEOPENIA OF BOTH HIPS: ICD-10-CM

## 2023-05-08 RX ORDER — ALENDRONATE SODIUM 35 MG/1
TABLET ORAL
Qty: 4 TABLET | Refills: 0 | Status: SHIPPED | OUTPATIENT
Start: 2023-05-08 | End: 2023-05-16

## 2023-05-08 NOTE — TELEPHONE ENCOUNTER
Medication is being filled for 1 time refill only due to:  Patient needs to be seen because due for physical .     Noted from 111/6/22 VV:  Return in about 6 months (around 5/16/2023) for Preventive/Wellness Visit, Depression, Anxiety.     Dianna Lunsford RN

## 2023-05-11 ENCOUNTER — HOSPITAL ENCOUNTER (OUTPATIENT)
Dept: MAMMOGRAPHY | Facility: CLINIC | Age: 83
Discharge: HOME | End: 2023-05-11
Attending: FAMILY MEDICINE | Admitting: FAMILY MEDICINE
Payer: COMMERCIAL

## 2023-05-11 DIAGNOSIS — Z12.31 VISIT FOR SCREENING MAMMOGRAM: ICD-10-CM

## 2023-05-11 PROCEDURE — 77067 SCR MAMMO BI INCL CAD: CPT

## 2023-05-16 ENCOUNTER — TELEPHONE (OUTPATIENT)
Dept: FAMILY MEDICINE | Facility: CLINIC | Age: 83
End: 2023-05-16
Payer: COMMERCIAL

## 2023-05-16 DIAGNOSIS — M85.852 OSTEOPENIA OF BOTH HIPS: ICD-10-CM

## 2023-05-16 DIAGNOSIS — M85.851 OSTEOPENIA OF BOTH HIPS: ICD-10-CM

## 2023-05-16 RX ORDER — ALENDRONATE SODIUM 35 MG/1
TABLET ORAL
Qty: 4 TABLET | Refills: 0 | Status: SHIPPED | OUTPATIENT
Start: 2023-05-16 | End: 2023-05-30

## 2023-05-16 NOTE — TELEPHONE ENCOUNTER
Patient call to schedule a 6 month follow up. Although, no available appointment in person therefore schedule as a virtual appointment. Patient is schedule virtually on 6/6/2023 wondering if pcp is willing to refill her alendronate medication until appointment. Will forward message to clinic to call patient back to confirm.

## 2023-05-16 NOTE — TELEPHONE ENCOUNTER
Patient due for annual wellness in addition to six month follow-up.  Converted to in person appointment.  Prescription approved per [facility] Refill Protocol.  Meenu KIRK RN

## 2023-05-22 DIAGNOSIS — F33.1 MAJOR DEPRESSIVE DISORDER, RECURRENT, MODERATE (H): ICD-10-CM

## 2023-05-22 DIAGNOSIS — M85.852 OSTEOPENIA OF BOTH HIPS: ICD-10-CM

## 2023-05-22 DIAGNOSIS — M85.851 OSTEOPENIA OF BOTH HIPS: ICD-10-CM

## 2023-05-22 DIAGNOSIS — F41.1 ANXIETY STATE: ICD-10-CM

## 2023-05-23 RX ORDER — ESCITALOPRAM OXALATE 10 MG/1
TABLET ORAL
Qty: 30 TABLET | Refills: 0 | Status: SHIPPED | OUTPATIENT
Start: 2023-05-23 | End: 2023-06-06

## 2023-05-30 RX ORDER — ALENDRONATE SODIUM 35 MG/1
TABLET ORAL
Qty: 4 TABLET | Refills: 0 | Status: SHIPPED | OUTPATIENT
Start: 2023-05-30 | End: 2023-06-06

## 2023-05-30 NOTE — TELEPHONE ENCOUNTER
Patient calling to follow-up on alendronate prescription.  States she has been unable to get this from [facility]  Prescription approved per [facility] Refill Protocol.

## 2023-06-05 ASSESSMENT — PATIENT HEALTH QUESTIONNAIRE - PHQ9
10. IF YOU CHECKED OFF ANY PROBLEMS, HOW DIFFICULT HAVE THESE PROBLEMS MADE IT FOR YOU TO DO YOUR WORK, TAKE CARE OF THINGS AT HOME, OR GET ALONG WITH OTHER PEOPLE: NOT DIFFICULT AT ALL
SUM OF ALL RESPONSES TO PHQ QUESTIONS 1-9: 4
SUM OF ALL RESPONSES TO PHQ QUESTIONS 1-9: 4

## 2023-06-05 ASSESSMENT — ACTIVITIES OF DAILY LIVING (ADL): CURRENT_FUNCTION: NO ASSISTANCE NEEDED

## 2023-06-05 ASSESSMENT — ENCOUNTER SYMPTOMS
HEMATOCHEZIA: 0
NAUSEA: 0
WEAKNESS: 0
ARTHRALGIAS: 1
SHORTNESS OF BREATH: 0
HEARTBURN: 0
FEVER: 0
JOINT SWELLING: 1
PALPITATIONS: 0
HEADACHES: 0
DIZZINESS: 0
FREQUENCY: 0
DYSURIA: 0
DIARRHEA: 0
PARESTHESIAS: 0
MYALGIAS: 0
EYE PAIN: 0
CONSTIPATION: 0
NERVOUS/ANXIOUS: 1
COUGH: 0
CHILLS: 0
SORE THROAT: 0
ABDOMINAL PAIN: 0
BREAST MASS: 0

## 2023-06-06 ENCOUNTER — OFFICE VISIT (OUTPATIENT)
Dept: FAMILY MEDICINE | Facility: CLINIC | Age: 83
End: 2023-06-06
Payer: COMMERCIAL

## 2023-06-06 VITALS
TEMPERATURE: 98.1 F | SYSTOLIC BLOOD PRESSURE: 140 MMHG | DIASTOLIC BLOOD PRESSURE: 82 MMHG | HEART RATE: 73 BPM | RESPIRATION RATE: 14 BRPM | WEIGHT: 145.6 LBS | OXYGEN SATURATION: 94 % | HEIGHT: 64 IN | BODY MASS INDEX: 24.86 KG/M2

## 2023-06-06 DIAGNOSIS — M85.852 OSTEOPENIA OF BOTH HIPS: ICD-10-CM

## 2023-06-06 DIAGNOSIS — Z23 NEED FOR SHINGLES VACCINE: ICD-10-CM

## 2023-06-06 DIAGNOSIS — Z12.11 SCREEN FOR COLON CANCER: ICD-10-CM

## 2023-06-06 DIAGNOSIS — R29.3 POSTURAL IMBALANCE: ICD-10-CM

## 2023-06-06 DIAGNOSIS — M85.851 OSTEOPENIA OF BOTH HIPS: ICD-10-CM

## 2023-06-06 DIAGNOSIS — R03.0 ELEVATED BP WITHOUT DIAGNOSIS OF HYPERTENSION: ICD-10-CM

## 2023-06-06 DIAGNOSIS — F41.1 ANXIETY STATE: ICD-10-CM

## 2023-06-06 DIAGNOSIS — E78.5 HYPERLIPIDEMIA WITH TARGET LDL LESS THAN 130: ICD-10-CM

## 2023-06-06 DIAGNOSIS — F33.1 MAJOR DEPRESSIVE DISORDER, RECURRENT, MODERATE (H): ICD-10-CM

## 2023-06-06 DIAGNOSIS — R26.81 GAIT INSTABILITY: ICD-10-CM

## 2023-06-06 DIAGNOSIS — Z00.00 ENCOUNTER FOR MEDICARE ANNUAL WELLNESS EXAM: Primary | ICD-10-CM

## 2023-06-06 PROCEDURE — 0124A COVID-19 BIVALENT 12+ (PFIZER): CPT | Performed by: FAMILY MEDICINE

## 2023-06-06 PROCEDURE — G0439 PPPS, SUBSEQ VISIT: HCPCS | Performed by: FAMILY MEDICINE

## 2023-06-06 PROCEDURE — 99214 OFFICE O/P EST MOD 30 MIN: CPT | Mod: 25 | Performed by: FAMILY MEDICINE

## 2023-06-06 PROCEDURE — 91312 COVID-19 BIVALENT 12+ (PFIZER): CPT | Performed by: FAMILY MEDICINE

## 2023-06-06 RX ORDER — ESCITALOPRAM OXALATE 10 MG/1
10 TABLET ORAL DAILY
Qty: 90 TABLET | Refills: 1 | Status: SHIPPED | OUTPATIENT
Start: 2023-06-06 | End: 2023-12-07

## 2023-06-06 RX ORDER — ALENDRONATE SODIUM 35 MG/1
TABLET ORAL
Qty: 12 TABLET | Refills: 0 | Status: SHIPPED | OUTPATIENT
Start: 2023-06-06 | End: 2023-11-27

## 2023-06-06 ASSESSMENT — ENCOUNTER SYMPTOMS
SORE THROAT: 0
SHORTNESS OF BREATH: 0
MYALGIAS: 0
JOINT SWELLING: 1
ABDOMINAL PAIN: 0
PALPITATIONS: 0
NAUSEA: 0
HEADACHES: 0
NERVOUS/ANXIOUS: 1
DYSURIA: 0
CONSTIPATION: 0
ARTHRALGIAS: 1
COUGH: 0
HEARTBURN: 0
HEMATOCHEZIA: 0
FREQUENCY: 0
PARESTHESIAS: 0
BREAST MASS: 0
EYE PAIN: 0
DIARRHEA: 0
FEVER: 0
DIZZINESS: 0
CHILLS: 0
WEAKNESS: 0

## 2023-06-06 ASSESSMENT — ANXIETY QUESTIONNAIRES
5. BEING SO RESTLESS THAT IT IS HARD TO SIT STILL: NOT AT ALL
4. TROUBLE RELAXING: NOT AT ALL
3. WORRYING TOO MUCH ABOUT DIFFERENT THINGS: SEVERAL DAYS
7. FEELING AFRAID AS IF SOMETHING AWFUL MIGHT HAPPEN: SEVERAL DAYS
IF YOU CHECKED OFF ANY PROBLEMS ON THIS QUESTIONNAIRE, HOW DIFFICULT HAVE THESE PROBLEMS MADE IT FOR YOU TO DO YOUR WORK, TAKE CARE OF THINGS AT HOME, OR GET ALONG WITH OTHER PEOPLE: NOT DIFFICULT AT ALL
GAD7 TOTAL SCORE: 5
6. BECOMING EASILY ANNOYED OR IRRITABLE: SEVERAL DAYS
2. NOT BEING ABLE TO STOP OR CONTROL WORRYING: SEVERAL DAYS
7. FEELING AFRAID AS IF SOMETHING AWFUL MIGHT HAPPEN: SEVERAL DAYS
8. IF YOU CHECKED OFF ANY PROBLEMS, HOW DIFFICULT HAVE THESE MADE IT FOR YOU TO DO YOUR WORK, TAKE CARE OF THINGS AT HOME, OR GET ALONG WITH OTHER PEOPLE?: NOT DIFFICULT AT ALL
1. FEELING NERVOUS, ANXIOUS, OR ON EDGE: SEVERAL DAYS
GAD7 TOTAL SCORE: 5

## 2023-06-06 ASSESSMENT — PAIN SCALES - GENERAL: PAINLEVEL: NO PAIN (0)

## 2023-06-06 ASSESSMENT — ACTIVITIES OF DAILY LIVING (ADL): CURRENT_FUNCTION: NO ASSISTANCE NEEDED
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
## 2023-06-06 NOTE — PATIENT INSTRUCTIONS
Patient Education   Personalized Prevention Plan  You are due for the preventive services outlined below.  Your care team is available to assist you in scheduling these services.  If you have already completed any of these items, please share that information with your care team to update in your medical record.  Health Maintenance Due   Topic Date Due     ANNUAL REVIEW OF HM ORDERS  Never done     Colorectal Cancer Screening  01/06/2022     Osteoporosis Screening  12/06/2022     COVID-19 Vaccine (6 - Pfizer series) 02/07/2023     Zoster (Shingles) Vaccine (3 of 3) 07/26/2022       Signs of Hearing Loss  Hearing loss is a problem shared by many people. In fact, it's one of the most common health problems, particularly as people age. Most people aged 65 and older have some hearing loss. By age 80, almost everyone does. Hearing loss often occurs slowly over the years. So, you may not realize your hearing has gotten worse.   When sudden hearing loss occurs, it's important to contact your healthcare provider right away. Your provider will do a medical exam and a hearing exam as soon as possible. This is to help find the cause and type of your sudden hearing loss. Based on your diagnosis, your healthcare provider will discuss possible treatments.      Hearing much better with one ear can be a sign of hearing loss.     Have your hearing checked  Call your healthcare provider if you:     Have to strain to hear normal conversation    Have to watch other people s faces very carefully to follow what they re saying    Need to ask people to repeat what they ve said    Often misunderstand what people are saying    Turn the volume of the television or radio up so high that others complain    Feel that people are mumbling when they re talking to you    Find that the effort to hear leaves you feeling tired and irritated    Notice, when using the phone, that you hear better with one ear than the other  Eran last reviewed this  educational content on 6/1/2022 2000-2022 [employer]. All rights reserved. This information is not intended as a substitute for professional medical care. Always follow your healthcare professional's instructions.        Your Health Risk Assessment indicates you feel you are not in good emotional health.    Recreation   Recreation is not limited to sports and team events. It includes any activity that provides relaxation, interest, enjoyment, and exercise. Recreation provides an outlet for physical, mental, and social energy. It can give a sense of worth and achievement. It can help you stay healthy.    Mental Exercise and Social Involvement  Mental and emotional health is as important as physical health. Keep in touch with friends and family. Stay as active as possible. Continue to learn and challenge yourself.   Things you can do to stay mentally active are:    Learn something new, like a foreign language or musical instrument.     Play SCRABBLE or do crossword puzzles. If you cannot find people to play these games with you at home, you can play them with others on your computer through the Internet.     Join a games club--anything from card games to chess or checkers or lawn bowling.     Start a new hobby.     Go back to school.     Volunteer.     Read.   Keep up with world events.

## 2023-06-13 ENCOUNTER — LAB (OUTPATIENT)
Dept: LAB | Facility: CLINIC | Age: 83
End: 2023-06-13
Payer: COMMERCIAL

## 2023-06-13 DIAGNOSIS — M85.852 OSTEOPENIA OF BOTH HIPS: ICD-10-CM

## 2023-06-13 DIAGNOSIS — E78.5 HYPERLIPIDEMIA WITH TARGET LDL LESS THAN 130: ICD-10-CM

## 2023-06-13 DIAGNOSIS — M85.851 OSTEOPENIA OF BOTH HIPS: ICD-10-CM

## 2023-06-13 LAB
ALBUMIN SERPL BCG-MCNC: 4.4 G/DL (ref 3.5–5.2)
ALP SERPL-CCNC: 69 U/L (ref 35–104)
ALT SERPL W P-5'-P-CCNC: 18 U/L (ref 0–50)
ANION GAP SERPL CALCULATED.3IONS-SCNC: 11 MMOL/L (ref 7–15)
AST SERPL W P-5'-P-CCNC: 27 U/L (ref 0–45)
BILIRUB SERPL-MCNC: 0.5 MG/DL
BUN SERPL-MCNC: 21.9 MG/DL (ref 8–23)
CALCIUM SERPL-MCNC: 9.3 MG/DL (ref 8.8–10.2)
CHLORIDE SERPL-SCNC: 106 MMOL/L (ref 98–107)
CHOLEST SERPL-MCNC: 181 MG/DL
CREAT SERPL-MCNC: 0.85 MG/DL (ref 0.51–0.95)
DEPRECATED CALCIDIOL+CALCIFEROL SERPL-MC: 37 UG/L (ref 20–75)
DEPRECATED HCO3 PLAS-SCNC: 23 MMOL/L (ref 22–29)
GFR SERPL CREATININE-BSD FRML MDRD: 68 ML/MIN/1.73M2
GLUCOSE SERPL-MCNC: 95 MG/DL (ref 70–99)
HDLC SERPL-MCNC: 58 MG/DL
LDLC SERPL CALC-MCNC: 109 MG/DL
NONHDLC SERPL-MCNC: 123 MG/DL
POTASSIUM SERPL-SCNC: 4.4 MMOL/L (ref 3.4–5.3)
PROT SERPL-MCNC: 7.2 G/DL (ref 6.4–8.3)
SODIUM SERPL-SCNC: 140 MMOL/L (ref 136–145)
TRIGL SERPL-MCNC: 69 MG/DL

## 2023-06-13 PROCEDURE — 82306 VITAMIN D 25 HYDROXY: CPT

## 2023-06-13 PROCEDURE — 36415 COLL VENOUS BLD VENIPUNCTURE: CPT

## 2023-06-13 PROCEDURE — 80053 COMPREHEN METABOLIC PANEL: CPT

## 2023-06-13 PROCEDURE — 80061 LIPID PANEL: CPT

## 2023-06-19 NOTE — RESULT ENCOUNTER NOTE
-Your cholesterol panel looks pretty good with a fairly low LDL (the bad cholesterol) and a good HDL (the good cholesterol).   -Your CMP (which includes electrolyte levels, blood sugar levels, and kidney and liver function tests) is normal.  -Your Vitamin D level is in a fairly good range, but I would try to remember to take your Vitamin D daily so it can increase the level slightly.    Please let me know if you have any questions.  Best,   Branden Delong MD

## 2023-06-23 ENCOUNTER — TELEPHONE (OUTPATIENT)
Dept: FAMILY MEDICINE | Facility: CLINIC | Age: 83
End: 2023-06-23
Payer: COMMERCIAL

## 2023-06-23 DIAGNOSIS — M85.852 OSTEOPENIA OF BOTH HIPS: Primary | ICD-10-CM

## 2023-06-23 DIAGNOSIS — M85.851 OSTEOPENIA OF BOTH HIPS: Primary | ICD-10-CM

## 2023-06-23 NOTE — CONFIDENTIAL NOTE
RECORDS RECEIVED FROM: internal    DATE RECEIVED: 9.1.23    NOTES (FOR ALL VISITS) STATUS DETAILS   OFFICE NOTES from referring provider internal  Dr Delong      MEDICATION LIST internal       LABS     DIABETES: HBGA1C, CREATININE, FASTING LIPIDS, MICROALBUMIN URINE, POTASSIUM, TSH, T4    THYROID: TSH, T4, CBC, THYRODLONULIN, TOTAL T3, FREE T4, CALCITONIN, CEA internal  Vitamin D- 6.13.23  CMP- 6.13.23  Lipid- 6.13.23  BMP- 5.24.22              Cimetidine Counseling:  I discussed with the patient the risks of Cimetidine including but not limited to gynecomastia, headache, diarrhea, nausea, drowsiness, arrhythmias, pancreatitis, skin rashes, psychosis, bone marrow suppression and kidney toxicity.

## 2023-06-23 NOTE — TELEPHONE ENCOUNTER
CW,  Patient calling to follow-up on alendronate prescription.  Tried to schedule with endocrinology- cannot get in until September 1st.  Wondering what she should do with the prescription until then.  Patient aware you are out of the office until 6/28.  Please advise.  Meenu KIRK RN

## 2023-06-23 NOTE — TELEPHONE ENCOUNTER
I would have her keep taking it weekly until that appointment.  I also ordered another dexa scan for her to complete just prior to that appt.  Thanks!  CW

## 2023-06-26 NOTE — TELEPHONE ENCOUNTER
Patient updated  Verbalized understanding  Will pick medication up from pharmacy  Also will call to schedule dexa scan prior to appointment 9/1/23  Will call back if questions or concerns  Shayla ORR RN

## 2023-06-26 NOTE — TELEPHONE ENCOUNTER
Left message for patient to call [facility] back  When patient calls back please transfer to an RN  Shayla ORR RN

## 2023-06-27 ENCOUNTER — TELEPHONE (OUTPATIENT)
Dept: ENDOCRINOLOGY | Facility: CLINIC | Age: 83
End: 2023-06-27
Payer: COMMERCIAL

## 2023-06-27 NOTE — CONFIDENTIAL NOTE
RECORDS RECEIVED FROM: internal    DATE RECEIVED: 7.5.23    NOTES (FOR ALL VISITS) STATUS DETAILS   OFFICE NOTES from referring provider internal  Dr Delong       MEDICATION LIST internal         LABS       DIABETES: HBGA1C, CREATININE, FASTING LIPIDS, MICROALBUMIN URINE, POTASSIUM, TSH, T4     THYROID: TSH, T4, CBC, THYRODLONULIN, TOTAL T3, FREE T4, CALCITONIN, CEA internal  Vitamin D- 6.13.23  CMP- 6.13.23  Lipid- 6.13.23  BMP- 5.24.22

## 2023-06-27 NOTE — TELEPHONE ENCOUNTER
Patient call:     Appointment type: new endocrine   Provider: May  Return date:reschedule 9/1 appt, see below   Speciality phone number:  [phone]  Additional appointment(s) needed:   Additional notes: LVM and sent MyC to reschedule 9/1 appt to 7/5 at 11, 11:30, or 12   This day has held times and will not show in solutions, do not book if another pt is in slot   It will need to be manually scheduled     Domenic Gilliland on 6/27/2023 at 11:54 AM

## 2023-07-04 NOTE — PROGRESS NOTES
Endocrinology Note         Lawrence is a 82 year old female presents today for bone health    HPI  Lawrence is a 82 year old female with history of hyperlipidemia and depression presents today for evaluation of bone health    Patient reported having diagnosis of osteopenia for a while at least since 2010 when she had DXA scan that showed the lowest T score of -2 at the left femoral neck, T score -1.6 at right femoral neck and T score 0.1 at lumbar spine.    DXA scan in 2015 showed T score of -1.8 at the left and right hip and 2.4 at the lumbar spine.    She was started on Fosamax 35 mg weekly since 2015 with no side effect until now.    DXA scan in 2019 showed T score of -1.7 at left hip, T score -1.8 at the right hip and T score of 0.2 at lumbar spine.  There has been significant decreasing bone density at lumbar spine.    She sustained closed displaced fracture of distal end of left wrist August 2022 after a fall backward.    Bone Health:  Personal history of fracture:  Traumatic closed displaced fracture of distal end of left wrist August 2022.  Family history of osteoporosis or fracture: paternal grandmother may have osteoporosis  BMI: 24   Menopause: at age 54 years old  HRT: no   History of rheumatoid arthritis: no   Steroid use: no   Chronic disease: no   Calcium intake: milk or yogurt or cheese 1-2 serving per day  Vitamin D intake: no  Alcohol: a little of wine or beer once a while  Smoking: no    Activity: quite active, less steady lately, like to bike, some walking, some cross country ski  Fall: not much    Past Medical History  Past Medical History:   Diagnosis Date     Anxiety state, unspecified     paxil 10mg/d helps     BCC (basal cell carcinoma)     R side of nose, ? 2002     Depressive disorder Years ago    Periodic anxiety and trouble consentrating     ESBL (extended spectrum beta-lactamase) producing bacteria infection     w/ UTI in 7/10, sx's improved w/ macrobid     Major depressive disorder,  "recurrent, moderate (H) 1996    w/ anxiety, better on paroxetine x 10+ yrs, sx's returned after 1-2 months     Osteoarthritis     neg RA w/u, rheum consult in 12/07     Osteopenia     '10 dexa- worst t-score -2.0, cont ca/exercise     Other and unspecified disc disorder of lumbar region     surgery 10/08, helped some pains, still has arthritic jt pains, PT helps- [facility]       Allergies  Allergies   Allergen Reactions     No Known Drug Allergy      Medications  Current Outpatient Medications   Medication Sig Dispense Refill     alendronate (FOSAMAX) 35 MG tablet TAKE 1 TABLET BY MOUTH EVERY SEVEN DAYS 12 tablet 0     atorvastatin (LIPITOR) 20 MG tablet Take 1 tablet (20 mg) by mouth daily 90 tablet 3     escitalopram (LEXAPRO) 10 MG tablet Take 1 tablet (10 mg) by mouth daily 90 tablet 1     Family History  family history includes Cancer in her mother; Cardiovascular in her father; Coronary Artery Disease in her father; Mental Illness in her brother; Neurologic Disorder in her brother; Osteoporosis in her paternal grandmother; Other Cancer in her mother.   paternal grandmother may have osteoporosis    Social History  Social History     Tobacco Use     Smoking status: Never     Smokeless tobacco: Never   Substance Use Topics     Alcohol use: Never     Comment: on occ, glass wine/wk     Drug use: Never   No smoking  Drink wine or beer occasionally  Lives with     ROS  10 points ROS were negative otherwise mentioned in HPI    Physical Exam  /78   Pulse 79   Ht 1.626 m (5' 4\")   Wt 64.2 kg (141 lb 9.6 oz)   SpO2 93%   BMI 24.31 kg/m    Body mass index is 24.31 kg/m .  Constitutional: no distress, comfortable, pleasant   Eyes: anicteric, normal extra-ocular movements, no lid lag or retraction  Neck: no thyromegaly, no discrete nodule  Cardiovascular: regular rate and rhythm, normal S1 and S2, no murmurs  Respiratory: clear to auscultation, no wheezes or crackles, normal breath sounds "   Gastrointestinal:  nontender, no hepatomegaly, no masses   Musculoskeletal: no edema   Skin: no concerning lesions, no jaundice   Neurological: cranial nerves intact, normal gait, no tremor on outstretched hands bilaterally  Psychological: appropriate mood   Lymphatic: no cervical  lymphadenopathy.      RESULTS  I have personally reviewed labs and images. I also reviewed labs with patient and discussed the result and plan of care.    DXA 7/29/2010  FINDINGS:               Lumbar Spine L2-L4:  T-score 0.1               Left Femoral Neck:  T-score -2.0                 Right Femoral Neck:  T-score -1.6                  COMMENTS:  None     CURRENT BMD:  Lumbar Spine: 1.203  Femoral Neck: 0.817.     Electronically filed by Maia Talamantes    7/29/2010  3:09 PM     IMPRESSION:   Osteopenia   Degenerative changes of the spine may falsely be elevating   T-scores       DXA 6/1/2015  T-SCORES:  Lumbar Spine L1-L4 T-score: 0.4     Left Hip (Neck) T-score: -1.8  Right Hip (Neck) T-score: -1.8     Hip lowest BMD: 0.786 gm/cm2.     FRAX 10-YEAR PROBABILITY OF FRACTURE*:  Major Osteoporotic: 13%  Hip: 3%     IMPRESSION: Bilateral hip osteopenia.      DXA 2/6/2019  T-SCORES:  Lumbar Spine L1-L4 T-score: 0.2     Left Hip (Total) T-score: -1.7  Right Hip (Neck) T-score: -1.8     Hip lowest neck BMD: 0.784 gm/cm2.     PERCENT CHANGE since 6/1/2015:  Lumbar Spine: Decreased significantly by -2.3%  Femurs: Not significantly changed, 0.9%     FRAX 10-YEAR PROBABILITY OF FRACTURE*:  Major Osteoporotic: 22%  Hip: 5%                                                                   IMPRESSION: Bilateral hip osteopenia.    ASSESSMENT:    Lawrence is a 82 year old female with history of hyperlipidemia and depression presents today for evaluation of bone health    1) osteopenia with recent left wrist fracture: She was diagnosed with osteopenia since 2010 and was started that on Fosamax 35 mg weekly since 2015 until now.  Recent DXA scan in  2019 showed osteopenia with the lowest T score of -1.8 at the left hip.  There has been decreasing in bone density at the lumbar spine.  Patient sustained left wrist fracture in 2022 after a fall.   Given fracture during taking antiresorptive and high risk of subsequent fracture, I would recommend to switch medication to either anabolic agents or intravenous zoledronic acid.   I would repeat a bone density that should be done at [facility] this year before making a decision.  I will check labs for calcium, phosphorus, parathyroid hormone today.  Patient should continue with dairy product  Patient should continue weight bearing exercise with fall precaution    PLAN:   - schedule DXA scan this year  - lab today  - will communicate with patient    External notes/medical records independently reviewed, labs and imaging independently reviewed, medical management and tests to be discussed/communicated to patient.    Time: I spent 47 minutes spent on the date of the encounter preparing to see patient (including chart review and preparation), obtaining and or reviewing additional medical history, performing a physical exam and evaluation, documenting clinical information in the electronic health record, independently interpreting results, communicating results to the patient and coordinating care.    May Calhoun MD  Division of Diabetes and Endocrinology  Department of Medicine

## 2023-07-05 ENCOUNTER — PRE VISIT (OUTPATIENT)
Dept: ENDOCRINOLOGY | Facility: CLINIC | Age: 83
End: 2023-07-05

## 2023-07-05 ENCOUNTER — OFFICE VISIT (OUTPATIENT)
Dept: ENDOCRINOLOGY | Facility: CLINIC | Age: 83
End: 2023-07-05
Attending: FAMILY MEDICINE
Payer: COMMERCIAL

## 2023-07-05 VITALS
HEART RATE: 79 BPM | SYSTOLIC BLOOD PRESSURE: 136 MMHG | DIASTOLIC BLOOD PRESSURE: 78 MMHG | OXYGEN SATURATION: 93 % | WEIGHT: 141.6 LBS | BODY MASS INDEX: 24.17 KG/M2 | HEIGHT: 64 IN

## 2023-07-05 DIAGNOSIS — M85.852 OSTEOPENIA OF BOTH HIPS: ICD-10-CM

## 2023-07-05 DIAGNOSIS — M85.851 OSTEOPENIA OF BOTH HIPS: ICD-10-CM

## 2023-07-05 PROCEDURE — 99204 OFFICE O/P NEW MOD 45 MIN: CPT | Performed by: INTERNAL MEDICINE

## 2023-07-05 ASSESSMENT — PAIN SCALES - GENERAL: PAINLEVEL: MODERATE PAIN (4)

## 2023-07-05 NOTE — LETTER
7/5/2023       RE: Lawrence Pemberton  [address] [place] [address]     Dear Colleague,    Thank you for referring your patient, Lawrence Pemberton, to the [facility] [place] at [facility]. Please see a copy of my visit note below.         Endocrinology Note         Lawrence is a 82 year old female presents today for bone health    HPI  Lawrence is a 82 year old female with history of hyperlipidemia and depression presents today for evaluation of bone health    Patient reported having diagnosis of osteopenia for a while at least since 2010 when she had DXA scan that showed the lowest T score of -2 at the left femoral neck, T score -1.6 at right femoral neck and T score 0.1 at lumbar spine.    DXA scan in 2015 showed T score of -1.8 at the left and right hip and 2.4 at the lumbar spine.    She was started on Fosamax 35 mg weekly since 2015 with no side effect until now.    DXA scan in 2019 showed T score of -1.7 at left hip, T score -1.8 at the right hip and T score of 0.2 at lumbar spine.  There has been significant decreasing bone density at lumbar spine.    She sustained closed displaced fracture of distal end of left wrist August 2022 after a fall backward.    Bone Health:  Personal history of fracture:  Traumatic closed displaced fracture of distal end of left wrist August 2022.  Family history of osteoporosis or fracture: paternal grandmother may have osteoporosis  BMI: 24   Menopause: at age 54 years old  HRT: no   History of rheumatoid arthritis: no   Steroid use: no   Chronic disease: no   Calcium intake: milk or yogurt or cheese 1-2 serving per day  Vitamin D intake: no  Alcohol: a little of wine or beer once a while  Smoking: no    Activity: quite active, less steady lately, like to bike, some walking, some cross country ski  Fall: not much    Past Medical History  Past Medical History:   Diagnosis Date    Anxiety state,  "unspecified     paxil 10mg/d helps    BCC (basal cell carcinoma)     R side of nose, ? 2002    Depressive disorder Years ago    Periodic anxiety and trouble consentrating    ESBL (extended spectrum beta-lactamase) producing bacteria infection     w/ UTI in 7/10, sx's improved w/ macrobid    Major depressive disorder, recurrent, moderate (H) 1996    w/ anxiety, better on paroxetine x 10+ yrs, sx's returned after 1-2 months    Osteoarthritis     neg RA w/u, rheum consult in 12/07    Osteopenia     '10 dexa- worst t-score -2.0, cont ca/exercise    Other and unspecified disc disorder of lumbar region     surgery 10/08, helped some pains, still has arthritic jt pains, PT helps- [facility]       Allergies  Allergies   Allergen Reactions    No Known Drug Allergy      Medications  Current Outpatient Medications   Medication Sig Dispense Refill    alendronate (FOSAMAX) 35 MG tablet TAKE 1 TABLET BY MOUTH EVERY SEVEN DAYS 12 tablet 0    atorvastatin (LIPITOR) 20 MG tablet Take 1 tablet (20 mg) by mouth daily 90 tablet 3    escitalopram (LEXAPRO) 10 MG tablet Take 1 tablet (10 mg) by mouth daily 90 tablet 1     Family History  family history includes Cancer in her mother; Cardiovascular in her father; Coronary Artery Disease in her father; Mental Illness in her brother; Neurologic Disorder in her brother; Osteoporosis in her paternal grandmother; Other Cancer in her mother.   paternal grandmother may have osteoporosis    Social History  Social History     Tobacco Use    Smoking status: Never    Smokeless tobacco: Never   Substance Use Topics    Alcohol use: Never     Comment: on occ, glass wine/wk    Drug use: Never   No smoking  Drink wine or beer occasionally  Lives with     ROS  10 points ROS were negative otherwise mentioned in HPI    Physical Exam  /78   Pulse 79   Ht 1.626 m (5' 4\")   Wt 64.2 kg (141 lb 9.6 oz)   SpO2 93%   BMI 24.31 kg/m    Body mass index is 24.31 kg/m .  Constitutional: " no distress, comfortable, pleasant   Eyes: anicteric, normal extra-ocular movements, no lid lag or retraction  Neck: no thyromegaly, no discrete nodule  Cardiovascular: regular rate and rhythm, normal S1 and S2, no murmurs  Respiratory: clear to auscultation, no wheezes or crackles, normal breath sounds   Gastrointestinal:  nontender, no hepatomegaly, no masses   Musculoskeletal: no edema   Skin: no concerning lesions, no jaundice   Neurological: cranial nerves intact, normal gait, no tremor on outstretched hands bilaterally  Psychological: appropriate mood   Lymphatic: no cervical  lymphadenopathy.      RESULTS  I have personally reviewed labs and images. I also reviewed labs with patient and discussed the result and plan of care.    DXA 7/29/2010  FINDINGS:               Lumbar Spine L2-L4:  T-score 0.1               Left Femoral Neck:  T-score -2.0                 Right Femoral Neck:  T-score -1.6                  COMMENTS:  None     CURRENT BMD:  Lumbar Spine: 1.203  Femoral Neck: 0.817.     Electronically filed by Maia Talamantes    7/29/2010  3:09 PM     IMPRESSION:   Osteopenia   Degenerative changes of the spine may falsely be elevating   T-scores       DXA 6/1/2015  T-SCORES:  Lumbar Spine L1-L4 T-score: 0.4     Left Hip (Neck) T-score: -1.8  Right Hip (Neck) T-score: -1.8     Hip lowest BMD: 0.786 gm/cm2.     FRAX 10-YEAR PROBABILITY OF FRACTURE*:  Major Osteoporotic: 13%  Hip: 3%     IMPRESSION: Bilateral hip osteopenia.      DXA 2/6/2019  T-SCORES:  Lumbar Spine L1-L4 T-score: 0.2     Left Hip (Total) T-score: -1.7  Right Hip (Neck) T-score: -1.8     Hip lowest neck BMD: 0.784 gm/cm2.     PERCENT CHANGE since 6/1/2015:  Lumbar Spine: Decreased significantly by -2.3%  Femurs: Not significantly changed, 0.9%     FRAX 10-YEAR PROBABILITY OF FRACTURE*:  Major Osteoporotic: 22%  Hip: 5%                                                                   IMPRESSION: Bilateral hip osteopenia.    ASSESSMENT:     Lawrence is a 82 year old female with history of hyperlipidemia and depression presents today for evaluation of bone health    1) osteopenia with recent left wrist fracture: She was diagnosed with osteopenia since 2010 and was started that on Fosamax 35 mg weekly since 2015 until now.  Recent DXA scan in 2019 showed osteopenia with the lowest T score of -1.8 at the left hip.  There has been decreasing in bone density at the lumbar spine.  Patient sustained left wrist fracture in 2022 after a fall.   Given fracture during taking antiresorptive and high risk of subsequent fracture, I would recommend to switch medication to either anabolic agents or intravenous zoledronic acid.   I would repeat a bone density that should be done at [facility] this year before making a decision.  I will check labs for calcium, phosphorus, parathyroid hormone today.  Patient should continue with dairy product  Patient should continue weight bearing exercise with fall precaution    PLAN:   - schedule DXA scan this year  - lab today  - will communicate with patient    External notes/medical records independently reviewed, labs and imaging independently reviewed, medical management and tests to be discussed/communicated to patient.    Time: I spent 47 minutes spent on the date of the encounter preparing to see patient (including chart review and preparation), obtaining and or reviewing additional medical history, performing a physical exam and evaluation, documenting clinical information in the electronic health record, independently interpreting results, communicating results to the patient and coordinating care.    May Calhoun MD  Division of Diabetes and Endocrinology  Department of Medicine

## 2023-07-05 NOTE — PATIENT INSTRUCTIONS
Schedule bone density at [facility]  Blood work today    Please reach out to the following centers to schedule your appointment:     Imaging (DEXA, CT, MRI, XRAY)    [facility] ([facility] [place] [phone]   [facility] ([place], [place], [place]) [phone]   [facility] ([facility]) [phone]   [facility] ([facility]) [phone]     For any questions, please reach out to the [facility] Number for assistance: [phone].     If you have any questions, please do not hesitate to call clinic line at [phone] and ask for Endocrinology clinic.  If you need to fax, please fax to clinic fax number at [phone]    After clinic hours or weekends, please contact [phone] and ask for Endocrinologist-on call      Sincerely,    May Calhoun MD  Endocrinology

## 2023-07-16 DIAGNOSIS — E78.5 HYPERLIPIDEMIA WITH TARGET LDL LESS THAN 130: ICD-10-CM

## 2023-07-16 DIAGNOSIS — M85.852 OSTEOPENIA OF BOTH HIPS: ICD-10-CM

## 2023-07-16 DIAGNOSIS — M85.851 OSTEOPENIA OF BOTH HIPS: ICD-10-CM

## 2023-07-18 RX ORDER — ATORVASTATIN CALCIUM 20 MG/1
TABLET, FILM COATED ORAL
Qty: 90 TABLET | Refills: 2 | Status: SHIPPED | OUTPATIENT
Start: 2023-07-18 | End: 2024-02-20

## 2023-08-25 ENCOUNTER — HOSPITAL ENCOUNTER (OUTPATIENT)
Dept: BONE DENSITY | Facility: CLINIC | Age: 83
Discharge: HOME | End: 2023-08-25
Attending: INTERNAL MEDICINE | Admitting: INTERNAL MEDICINE
Payer: COMMERCIAL

## 2023-08-25 DIAGNOSIS — M85.852 OSTEOPENIA OF BOTH HIPS: ICD-10-CM

## 2023-08-25 DIAGNOSIS — M85.851 OSTEOPENIA OF BOTH HIPS: ICD-10-CM

## 2023-08-25 PROCEDURE — 77080 DXA BONE DENSITY AXIAL: CPT

## 2023-08-28 ENCOUNTER — TELEPHONE (OUTPATIENT)
Dept: ENDOCRINOLOGY | Facility: CLINIC | Age: 83
End: 2023-08-28
Payer: COMMERCIAL

## 2023-08-28 NOTE — TELEPHONE ENCOUNTER
Reviewed DXA result    Called patient and left message to call back    May Calhoun MD, MS  Division of Diabetes and Endocrinology  Department of Medicine

## 2023-08-30 ENCOUNTER — TELEPHONE (OUTPATIENT)
Dept: ENDOCRINOLOGY | Facility: CLINIC | Age: 83
End: 2023-08-30
Payer: COMMERCIAL

## 2023-08-30 DIAGNOSIS — M85.852 OSTEOPENIA OF BOTH HIPS: ICD-10-CM

## 2023-08-30 DIAGNOSIS — M85.851 OSTEOPENIA OF BOTH HIPS: ICD-10-CM

## 2023-08-30 DIAGNOSIS — S62.102A FRACTURE OF WRIST, LEFT, CLOSED, INITIAL ENCOUNTER: Primary | ICD-10-CM

## 2023-08-30 RX ORDER — HEPARIN SODIUM,PORCINE 10 UNIT/ML
5-20 VIAL (ML) INTRAVENOUS DAILY PRN
Status: CANCELLED | OUTPATIENT
Start: 2023-09-27

## 2023-08-30 RX ORDER — ACETAMINOPHEN 325 MG/1
650 TABLET ORAL EVERY 8 HOURS PRN
Status: CANCELLED | OUTPATIENT
Start: 2023-09-27

## 2023-08-30 RX ORDER — EPINEPHRINE 1 MG/ML
0.3 INJECTION, SOLUTION, CONCENTRATE INTRAVENOUS EVERY 5 MIN PRN
Status: CANCELLED | OUTPATIENT
Start: 2023-09-27

## 2023-08-30 RX ORDER — MEPERIDINE HYDROCHLORIDE 25 MG/ML
25 INJECTION INTRAMUSCULAR; INTRAVENOUS; SUBCUTANEOUS EVERY 30 MIN PRN
Status: CANCELLED | OUTPATIENT
Start: 2023-09-27

## 2023-08-30 RX ORDER — DIPHENHYDRAMINE HYDROCHLORIDE 50 MG/ML
50 INJECTION INTRAMUSCULAR; INTRAVENOUS
Status: CANCELLED
Start: 2023-09-27

## 2023-08-30 RX ORDER — HEPARIN SODIUM (PORCINE) LOCK FLUSH IV SOLN 100 UNIT/ML 100 UNIT/ML
5 SOLUTION INTRAVENOUS
Status: CANCELLED | OUTPATIENT
Start: 2023-09-27

## 2023-08-30 RX ORDER — ALBUTEROL SULFATE 0.83 MG/ML
2.5 SOLUTION RESPIRATORY (INHALATION)
Status: CANCELLED | OUTPATIENT
Start: 2023-09-27

## 2023-08-30 RX ORDER — ALBUTEROL SULFATE 90 UG/1
1-2 AEROSOL, METERED RESPIRATORY (INHALATION)
Status: CANCELLED
Start: 2023-09-27

## 2023-08-30 RX ORDER — ZOLEDRONIC ACID 5 MG/100ML
5 INJECTION, SOLUTION INTRAVENOUS ONCE
Status: CANCELLED
Start: 2023-09-27

## 2023-08-30 RX ORDER — METHYLPREDNISOLONE SODIUM SUCCINATE 125 MG/2ML
125 INJECTION, POWDER, LYOPHILIZED, FOR SOLUTION INTRAMUSCULAR; INTRAVENOUS
Status: CANCELLED
Start: 2023-09-27

## 2023-08-30 NOTE — TELEPHONE ENCOUNTER
Called patient re:DXA this year that shows decline BMD at the hips  Based on the result and left wrist fracture in 2022, I would recommend to switch form Fosamax to IV Reclast infusion for 3 years.    Patient verbalized understanding  Will send mychart message for the detail and scheduling    May Calhoun MD, MS  Division of Diabetes and Endocrinology  Department of Medicine

## 2023-09-01 ENCOUNTER — PRE VISIT (OUTPATIENT)
Dept: ENDOCRINOLOGY | Facility: CLINIC | Age: 83
End: 2023-09-01

## 2023-09-22 ENCOUNTER — MYC MEDICAL ADVICE (OUTPATIENT)
Dept: FAMILY MEDICINE | Facility: CLINIC | Age: 83
End: 2023-09-22
Payer: COMMERCIAL

## 2023-11-25 DIAGNOSIS — M85.851 OSTEOPENIA OF BOTH HIPS: ICD-10-CM

## 2023-11-25 DIAGNOSIS — M85.852 OSTEOPENIA OF BOTH HIPS: ICD-10-CM

## 2023-11-27 RX ORDER — ALENDRONATE SODIUM 35 MG/1
TABLET ORAL
Qty: 12 TABLET | Refills: 0 | Status: SHIPPED | OUTPATIENT
Start: 2023-11-27 | End: 2023-12-15

## 2023-12-04 DIAGNOSIS — F33.1 MAJOR DEPRESSIVE DISORDER, RECURRENT, MODERATE (H): ICD-10-CM

## 2023-12-04 DIAGNOSIS — F41.1 ANXIETY STATE: ICD-10-CM

## 2023-12-07 RX ORDER — ESCITALOPRAM OXALATE 10 MG/1
10 TABLET ORAL DAILY
Qty: 30 TABLET | Refills: 0 | Status: SHIPPED | OUTPATIENT
Start: 2023-12-07 | End: 2024-01-09

## 2023-12-07 NOTE — TELEPHONE ENCOUNTER
Please call pt and see if she can schedule an appt.  She is due for her 6mo follow-up MDD/anxiety appt.  Video appt if she thinks it is possible, relatively soonest clinic visit we can find otherwise (does not need an urgent cancel spot, though).      Will send in #30 of the lexapro until we know when she has follow-up scheduled.    Triage- sending to you because it looks like alendronate was mistakenly refilled.  Pt has been seeing endo, and osteoporosis prevention txt has changed.  Please discuss with pt and make sure she is not taking both- should have stopped the alendronate according to notes from endo.    Thanks!  CW

## 2023-12-07 NOTE — TELEPHONE ENCOUNTER
Patient is scheduled for surgery with Dr. Hall    Spoke or left message with: Patient    Date of Surgery: 8/29/19    Location: [facility]    Informed patient they will need an adult  : yes, patient's     Arrival time: 7:45am    Procedure time: 8:45am                   
2.12

## 2023-12-12 ENCOUNTER — MYC MEDICAL ADVICE (OUTPATIENT)
Dept: FAMILY MEDICINE | Facility: CLINIC | Age: 83
End: 2023-12-12
Payer: COMMERCIAL

## 2023-12-12 NOTE — TELEPHONE ENCOUNTER
CW,      Spoke with patient.  States she has been taking Alendronate.  Asking if she is suppose to stop it    She will check her schedule and schedule follow up with you.    Dianna Lunsford RN       independent

## 2023-12-12 NOTE — TELEPHONE ENCOUNTER
Attempt #2 to reach patient.  Also sent [employer] with information.    Left message for patient to call [facility] back  When patient calls back please transfer to an RN  Meenu KRIK RN

## 2023-12-14 ENCOUNTER — LAB (OUTPATIENT)
Dept: LAB | Facility: CLINIC | Age: 83
End: 2023-12-14
Payer: COMMERCIAL

## 2023-12-14 DIAGNOSIS — M85.851 OSTEOPENIA OF BOTH HIPS: ICD-10-CM

## 2023-12-14 DIAGNOSIS — M85.852 OSTEOPENIA OF BOTH HIPS: ICD-10-CM

## 2023-12-14 PROCEDURE — 36415 COLL VENOUS BLD VENIPUNCTURE: CPT

## 2023-12-14 PROCEDURE — 84100 ASSAY OF PHOSPHORUS: CPT

## 2023-12-14 PROCEDURE — 99000 SPECIMEN HANDLING OFFICE-LAB: CPT | Performed by: PATHOLOGY

## 2023-12-14 PROCEDURE — 82565 ASSAY OF CREATININE: CPT | Performed by: INTERNAL MEDICINE

## 2023-12-14 PROCEDURE — 83970 ASSAY OF PARATHORMONE: CPT

## 2023-12-14 PROCEDURE — 82310 ASSAY OF CALCIUM: CPT

## 2023-12-14 NOTE — TELEPHONE ENCOUNTER
Tried to call pt but no answer. She has not read my msg last night.    TC team- could you call her and see if you can get her in to the endo labs today or tomorrow?    Thanks!  CW

## 2023-12-14 NOTE — TELEPHONE ENCOUNTER
Called patient and is scheduled Today for a lab Appt at 2:45  [facility] Coordinator

## 2023-12-15 DIAGNOSIS — M85.852 OSTEOPENIA OF BOTH HIPS: Primary | ICD-10-CM

## 2023-12-15 DIAGNOSIS — M85.851 OSTEOPENIA OF BOTH HIPS: Primary | ICD-10-CM

## 2023-12-15 LAB
CALCIUM SERPL-MCNC: 9.3 MG/DL (ref 8.8–10.2)
CREAT SERPL-MCNC: 0.94 MG/DL (ref 0.51–0.95)
EGFRCR SERPLBLD CKD-EPI 2021: 60 ML/MIN/1.73M2
PHOSPHATE SERPL-MCNC: 4 MG/DL (ref 2.5–4.5)
PTH-INTACT SERPL-MCNC: 38 PG/ML (ref 15–65)

## 2024-01-08 ENCOUNTER — INFUSION THERAPY VISIT (OUTPATIENT)
Dept: INFUSION THERAPY | Facility: CLINIC | Age: 84
End: 2024-01-08
Attending: INTERNAL MEDICINE
Payer: COMMERCIAL

## 2024-01-08 VITALS
DIASTOLIC BLOOD PRESSURE: 73 MMHG | OXYGEN SATURATION: 97 % | HEART RATE: 68 BPM | SYSTOLIC BLOOD PRESSURE: 127 MMHG | TEMPERATURE: 97.7 F | RESPIRATION RATE: 16 BRPM

## 2024-01-08 DIAGNOSIS — S62.102A FRACTURE OF WRIST, LEFT, CLOSED, INITIAL ENCOUNTER: Primary | ICD-10-CM

## 2024-01-08 DIAGNOSIS — M85.852 OSTEOPENIA OF BOTH HIPS: ICD-10-CM

## 2024-01-08 DIAGNOSIS — M85.851 OSTEOPENIA OF BOTH HIPS: ICD-10-CM

## 2024-01-08 PROCEDURE — 250N000013 HC RX MED GY IP 250 OP 250 PS 637: Performed by: INTERNAL MEDICINE

## 2024-01-08 PROCEDURE — 96365 THER/PROPH/DIAG IV INF INIT: CPT

## 2024-01-08 PROCEDURE — 250N000011 HC RX IP 250 OP 636: Mod: JZ | Performed by: INTERNAL MEDICINE

## 2024-01-08 RX ORDER — ACETAMINOPHEN 325 MG/1
650 TABLET ORAL EVERY 8 HOURS PRN
Status: DISCONTINUED | OUTPATIENT
Start: 2024-01-08 | End: 2024-01-08 | Stop reason: HOSPADM

## 2024-01-08 RX ORDER — ALBUTEROL SULFATE 0.83 MG/ML
2.5 SOLUTION RESPIRATORY (INHALATION)
Status: CANCELLED | OUTPATIENT
Start: 2025-01-07

## 2024-01-08 RX ORDER — EPINEPHRINE 1 MG/ML
0.3 INJECTION, SOLUTION INTRAMUSCULAR; SUBCUTANEOUS EVERY 5 MIN PRN
Status: CANCELLED | OUTPATIENT
Start: 2025-01-07

## 2024-01-08 RX ORDER — ZOLEDRONIC ACID 5 MG/100ML
5 INJECTION, SOLUTION INTRAVENOUS ONCE
Status: CANCELLED
Start: 2025-01-07

## 2024-01-08 RX ORDER — METHYLPREDNISOLONE SODIUM SUCCINATE 125 MG/2ML
125 INJECTION, POWDER, LYOPHILIZED, FOR SOLUTION INTRAMUSCULAR; INTRAVENOUS
Status: CANCELLED
Start: 2025-01-07

## 2024-01-08 RX ORDER — DIPHENHYDRAMINE HYDROCHLORIDE 50 MG/ML
50 INJECTION INTRAMUSCULAR; INTRAVENOUS
Status: CANCELLED
Start: 2025-01-07

## 2024-01-08 RX ORDER — HEPARIN SODIUM,PORCINE 10 UNIT/ML
5-20 VIAL (ML) INTRAVENOUS DAILY PRN
Status: CANCELLED | OUTPATIENT
Start: 2025-01-07

## 2024-01-08 RX ORDER — ALBUTEROL SULFATE 90 UG/1
1-2 AEROSOL, METERED RESPIRATORY (INHALATION)
Status: CANCELLED
Start: 2025-01-07

## 2024-01-08 RX ORDER — ZOLEDRONIC ACID 5 MG/100ML
5 INJECTION, SOLUTION INTRAVENOUS ONCE
Status: COMPLETED | OUTPATIENT
Start: 2024-01-08 | End: 2024-01-08

## 2024-01-08 RX ORDER — HEPARIN SODIUM (PORCINE) LOCK FLUSH IV SOLN 100 UNIT/ML 100 UNIT/ML
5 SOLUTION INTRAVENOUS
Status: CANCELLED | OUTPATIENT
Start: 2025-01-07

## 2024-01-08 RX ORDER — MEPERIDINE HYDROCHLORIDE 25 MG/ML
25 INJECTION INTRAMUSCULAR; INTRAVENOUS; SUBCUTANEOUS EVERY 30 MIN PRN
Status: CANCELLED | OUTPATIENT
Start: 2025-01-07

## 2024-01-08 RX ORDER — ACETAMINOPHEN 325 MG/1
650 TABLET ORAL EVERY 8 HOURS PRN
Status: CANCELLED | OUTPATIENT
Start: 2025-01-07

## 2024-01-08 RX ADMIN — ZOLEDRONIC ACID 5 MG: 0.05 INJECTION, SOLUTION INTRAVENOUS at 12:15

## 2024-01-08 RX ADMIN — ACETAMINOPHEN 650 MG: 325 TABLET, FILM COATED ORAL at 12:06

## 2024-01-08 ASSESSMENT — PAIN SCALES - GENERAL: PAINLEVEL: MODERATE PAIN (5)

## 2024-01-08 NOTE — PROGRESS NOTES
Infusion Nursing Note:  Lawrence Pemberton presents today for reclast.    Patient seen by provider today: No   present during visit today: Not Applicable.    Note: First dose reclast today. Reviewed common side effects with patient, given package insert for patients for reclast. Patient denies any current jaw pain or need for any upcoming dental work. Patient states she takes calcium and vitamin D supplements occasionally. Calcium WNL, reviewed recommended dosing per reclast orders, see patient instructions. Patient has no new concerns to report today.      Intravenous Access:  Peripheral IV placed.    Treatment Conditions:  Lab Results   Component Value Date     06/13/2023    POTASSIUM 4.4 06/13/2023    MAG 2.2 05/02/2007    CR 0.94 12/14/2023    NICOLAS 9.3 12/14/2023    BILITOTAL 0.5 06/13/2023    ALBUMIN 4.4 06/13/2023    ALT 18 06/13/2023    AST 27 06/13/2023     Creat clearance: 45.17 mL/min  Results reviewed, labs MET treatment parameters, ok to proceed with treatment.      Post Infusion Assessment:  Patient tolerated infusion without incident.  Blood return noted pre and post infusion.  Site patent and intact, free from redness, edema or discomfort.  No evidence of extravasations.  Access discontinued per protocol.       Discharge Plan:   Discharge instructions reviewed with: Patient.  Patient and/or family verbalized understanding of discharge instructions and all questions answered.  AVS to patient via zlienHART.  Patient will return in 1 year/per MD for next appointment.   Patient discharged in stable condition accompanied by: self.  Departure Mode: Ambulatory.      Rose Zamora RN

## 2024-01-08 NOTE — PATIENT INSTRUCTIONS
Calcium/vitamin D supplements: 1 tablet orally twice daily. Each tablet should have 500-600mg elemental calcium and 400 units of vitamin D.

## 2024-01-09 DIAGNOSIS — F41.1 ANXIETY STATE: ICD-10-CM

## 2024-01-09 DIAGNOSIS — F33.1 MAJOR DEPRESSIVE DISORDER, RECURRENT, MODERATE (H): ICD-10-CM

## 2024-01-09 RX ORDER — ESCITALOPRAM OXALATE 10 MG/1
10 TABLET ORAL DAILY
Qty: 30 TABLET | Refills: 2 | Status: SHIPPED | OUTPATIENT
Start: 2024-01-09 | End: 2024-01-10

## 2024-01-10 ENCOUNTER — OFFICE VISIT (OUTPATIENT)
Dept: FAMILY MEDICINE | Facility: CLINIC | Age: 84
End: 2024-01-10
Attending: FAMILY MEDICINE
Payer: COMMERCIAL

## 2024-01-10 VITALS
DIASTOLIC BLOOD PRESSURE: 60 MMHG | HEART RATE: 74 BPM | SYSTOLIC BLOOD PRESSURE: 122 MMHG | TEMPERATURE: 97.9 F | BODY MASS INDEX: 25.1 KG/M2 | OXYGEN SATURATION: 98 % | HEIGHT: 64 IN | RESPIRATION RATE: 22 BRPM | WEIGHT: 147 LBS

## 2024-01-10 DIAGNOSIS — M85.851 OSTEOPENIA OF BOTH HIPS: ICD-10-CM

## 2024-01-10 DIAGNOSIS — M85.852 OSTEOPENIA OF BOTH HIPS: ICD-10-CM

## 2024-01-10 DIAGNOSIS — R26.81 GAIT INSTABILITY: ICD-10-CM

## 2024-01-10 DIAGNOSIS — R29.3 POSTURAL IMBALANCE: ICD-10-CM

## 2024-01-10 DIAGNOSIS — F41.1 ANXIETY STATE: ICD-10-CM

## 2024-01-10 DIAGNOSIS — F33.1 MAJOR DEPRESSIVE DISORDER, RECURRENT, MODERATE (H): Primary | ICD-10-CM

## 2024-01-10 PROCEDURE — 99214 OFFICE O/P EST MOD 30 MIN: CPT | Performed by: FAMILY MEDICINE

## 2024-01-10 RX ORDER — ESCITALOPRAM OXALATE 20 MG/1
20 TABLET ORAL DAILY
Qty: 60 TABLET | Refills: 0 | Status: SHIPPED | OUTPATIENT
Start: 2024-01-10 | End: 2024-02-20

## 2024-01-10 ASSESSMENT — PATIENT HEALTH QUESTIONNAIRE - PHQ9
SUM OF ALL RESPONSES TO PHQ QUESTIONS 1-9: 6
SUM OF ALL RESPONSES TO PHQ QUESTIONS 1-9: 6
10. IF YOU CHECKED OFF ANY PROBLEMS, HOW DIFFICULT HAVE THESE PROBLEMS MADE IT FOR YOU TO DO YOUR WORK, TAKE CARE OF THINGS AT HOME, OR GET ALONG WITH OTHER PEOPLE: SOMEWHAT DIFFICULT

## 2024-01-10 ASSESSMENT — ANXIETY QUESTIONNAIRES
8. IF YOU CHECKED OFF ANY PROBLEMS, HOW DIFFICULT HAVE THESE MADE IT FOR YOU TO DO YOUR WORK, TAKE CARE OF THINGS AT HOME, OR GET ALONG WITH OTHER PEOPLE?: SOMEWHAT DIFFICULT
IF YOU CHECKED OFF ANY PROBLEMS ON THIS QUESTIONNAIRE, HOW DIFFICULT HAVE THESE PROBLEMS MADE IT FOR YOU TO DO YOUR WORK, TAKE CARE OF THINGS AT HOME, OR GET ALONG WITH OTHER PEOPLE: SOMEWHAT DIFFICULT
4. TROUBLE RELAXING: NOT AT ALL
GAD7 TOTAL SCORE: 5
6. BECOMING EASILY ANNOYED OR IRRITABLE: SEVERAL DAYS
1. FEELING NERVOUS, ANXIOUS, OR ON EDGE: SEVERAL DAYS
3. WORRYING TOO MUCH ABOUT DIFFERENT THINGS: SEVERAL DAYS
7. FEELING AFRAID AS IF SOMETHING AWFUL MIGHT HAPPEN: SEVERAL DAYS
7. FEELING AFRAID AS IF SOMETHING AWFUL MIGHT HAPPEN: SEVERAL DAYS
5. BEING SO RESTLESS THAT IT IS HARD TO SIT STILL: NOT AT ALL
2. NOT BEING ABLE TO STOP OR CONTROL WORRYING: SEVERAL DAYS

## 2024-01-10 ASSESSMENT — PAIN SCALES - GENERAL: PAINLEVEL: NO PAIN (0)

## 2024-01-10 NOTE — PROGRESS NOTES
Assessment & Plan       ICD-10-CM    1. Major depressive disorder, recurrent, moderate (H)  F33.1 escitalopram (LEXAPRO) 20 MG tablet      2. Anxiety state  F41.1 escitalopram (LEXAPRO) 20 MG tablet      3. Gait instability  R26.81 Physical Therapy Referral      4. Postural imbalance  R29.3       5. Osteopenia of both hips  M85.851     M85.852          MDD/anxiety- Pt reports mood has been worse the last couple months, more down days.  Would like to feel better.  --Will increase lexapro from 10mg/d to 20mg/d.  Follow-up in ~6 weeks for recheck. Risks and benefits of medication(s) including potential side effects reviewed with patient.  Questions answered.     Osteopenia/wrist fx after fall-  Pt has been on fosamax since ~'15-'16, with wrist fx in '22.  Consult with Endo this summer, completed dxa/labs, and switched her to reclast yearly injections.  First infusion a few days ago, tolerated it well.  Getting calcium in diet, takes Vit d, but only ~1/2 the days.  Will try and take daily.  Cont reclast yrly x 3 yrs, and follow-up dxa and endo follow-up in 3 yrs.    Gait instability/back pain-   Pt has had chronic left low back pain, but has become more unstable with decreased confidence the last couple yrs.  Worried about not being able to get back up if skiing/walking, using cane more with walking.  Did not do PT after referral in 6/23, but up for trying again now, [facility] if [place] location, [facility] if able to do with Ivone Roman.  Will place referral.      Return in about 6 weeks (around 2/20/2024) for Depression, Anxiety (2/20/24 Tues appt- arrive ~11:10am for check-in).        I spent a total of 32 minutes on the day of the visit.   Time spent by me doing chart review, history and exam, documentation and further activities per the note      Roberta Delong MD  [facility]          Gema Bravo is a 83 year old, presenting for the following health issues:  RECHECK (Medication  refill)        1/10/2024     7:00 AM   Additional Questions   Roomed by en montague   Accompanied by anabelle         1/10/2024     7:00 AM   Patient Reported Additional Medications   Patient reports taking the following new medications n/a       History of Present Illness       Reason for visit:  Six months follow up    She eats 2-3 servings of fruits and vegetables daily.She consumes 0 sweetened beverage(s) daily.She exercises with enough effort to increase her heart rate 60 or more minutes per day.  She exercises with enough effort to increase her heart rate 5 days per week. She is missing 1 dose(s) of medications per week.     Osteoporosis-   Had first reclast infusion a few days ago, went well, no se's.  Planning on reclast yearly for the next 3 yrs, then DXA and follow-up with Dr. Calhoun, endocrinology.  Calcium in diet- milk, cheese, greens.  Takes Vit D- unsure dose, half the days of the week when she remembers.    Never went to PT after referral in 6/23 for gait instability.  Using a canse, mostly because of her back pain.  Does feel unsteady walking by herself.  She is okay walking with poles or cane with bigger base- helps to have something to lean on.  Would consider PT again, [facility] if here, Ivone Roman.      MDD- mood has been more down lately, more alfonso.  Several days a week are more down, last couple months.  Used to be more active and outside in the winter.  Tries to get outside and walk.  Did just a little bit of cross-country skiing last year, no snow this year, but less as she is worried about falling, and mostly then worried about not being able to get up.              11/16/2022    11:05 AM 6/5/2023     9:05 PM 1/10/2024     6:49 AM   PHQ   PHQ-9 Total Score 5 4 6   Q9: Thoughts of better off dead/self-harm past 2 weeks Not at all Not at all Not at all         11/16/2022    11:03 AM 6/6/2023     1:03 PM 1/10/2024     6:50 AM   SHARMAINE-7 SCORE   Total Score 4 (minimal anxiety) 5 (mild anxiety) 5  "(mild anxiety)   Total Score 4 5 5           Review of Systems   Constitutional, HEENT, cardiovascular, pulmonary, gi and gu systems are negative, except as otherwise noted.      Objective    /60 (BP Location: Left arm, Patient Position: Sitting, Cuff Size: Adult Regular)   Pulse 74   Temp 97.9  F (36.6  C) (Temporal)   Resp 22   Ht 1.626 m (5' 4\")   Wt 66.7 kg (147 lb)   SpO2 98%   BMI 25.23 kg/m    Body mass index is 25.23 kg/m .  Physical Exam   GENERAL: healthy, alert and no distress  NECK: no adenopathy, no asymmetry, masses, or scars and thyroid normal to palpation  RESP: lungs clear to auscultation - no rales, rhonchi or wheezes  CV: regular rate and rhythm, normal S1 S2, no S3 or S4, no murmur, click or rub, no peripheral edema and peripheral pulses strong  MS: no gross musculoskeletal defects noted, no edema  PSYCH: mentation appears normal, affect normal/bright        Component      Latest Ref Rn 5/24/2022  8:50 AM 6/13/2023  9:06 AM 12/14/2023  2:37 PM   Sodium      136 - 145 mmol/L 139  140     Potassium      3.4 - 5.3 mmol/L  4.4     Chloride      98 - 107 mmol/L  106     Carbon Dioxide (CO2)      22 - 29 mmol/L  23     Anion Gap      7 - 15 mmol/L 6  11     Urea Nitrogen      8.0 - 23.0 mg/dL  21.9     Creatinine      0.51 - 0.95 mg/dL 0.83  0.85  0.94    Calcium      8.8 - 10.2 mg/dL 8.8  9.3  9.3    Glucose      70 - 99 mg/dL  95     Alkaline Phosphatase      35 - 104 U/L  69     AST      0 - 45 U/L  27     ALT      0 - 50 U/L  18     Protein Total      6.4 - 8.3 g/dL  7.2     Albumin      3.5 - 5.2 g/dL  4.4     Bilirubin Total      <=1.2 mg/dL  0.5     GFR Estimate      >60 mL/min/1.73m2 70  68  60 (L)    Potassium      3.4 - 5.3 mmol/L 4.1      Chloride      94 - 109 mmol/L 108      Carbon Dioxide      20 - 32 mmol/L 25      Urea Nitrogen      7 - 30 mg/dL 19      Glucose      70 - 99 mg/dL 86      Cholesterol      <200 mg/dL 163  181     Triglycerides      <150 mg/dL 78  69     HDL " Cholesterol      >=50 mg/dL 57  58     LDL Cholesterol Calculated      <=100 mg/dL 90  109 (H)     Non HDL Cholesterol      <130 mg/dL 106  123     Patient Fasting? Yes      Vitamin D Deficiency screening      20 - 75 ug/L 34  37     Phosphorus      2.5 - 4.5 mg/dL   4.0    Parathyroid Hormone Intact      15 - 65 pg/mL   38       Legend:  (H) High  (L) Low

## 2024-02-12 DIAGNOSIS — Z71.89 OTHER SPECIFIED COUNSELING: Primary | Chronic | ICD-10-CM

## 2024-02-13 ENCOUNTER — PATIENT OUTREACH (OUTPATIENT)
Dept: CARE COORDINATION | Facility: CLINIC | Age: 84
End: 2024-02-13
Payer: COMMERCIAL

## 2024-02-13 NOTE — PROGRESS NOTES
Clinic Care Coordination Contact  Community Health Worker Initial Outreach      Chart Review: [facility] referral 2/12/24:  [facility] referral received 2/12/2024.  Referral states that patient and her spouse are interested in learning more about resources for in-home assistance with cleaning as well as meal services       CHW Initial Information Gathering:  Referral Source: [facility] Referral  Current living arrangement:: I live in a private home with spouse  Type of residence:: Private home - stairs  Community Resources: None  Supplies Currently Used at Home: None  Equipment Currently Used at Home: none  Informal Support system:: None (2 sons, one in [place] and [place])  No PCP office visit in Past Year: No  Transportation means:: Regular car ( does not drive)  CHW Additional Questions  If ED/Hospital discharge, follow-up appointment scheduled as recommended?: N/A  Medication changes made following ED/Hospital discharge?: N/A  MyChart active?: Yes  Patient sent Social Determinants of Health questionnaire?: Yes      Patient accepts CC: Yes. Patient scheduled for assessment with PEPITO Nix RN, on 2/15/24 at 2:30 pm. Patient noted desire to discuss resources for house cleaning and as well as meal services.     Spoke with pt this afternoon:  Pt is doing all cooking, cleaning, grocery shopping presently. Would be interested in resources for assistance with house cleaning and meal services for the future.   Spouse has a stroke approximately one year ago per pt.  Support system in the area is limited. 2 sons live out of state/country. Spouse's sibling is 90 years old.      Sola Valentin  Community Health Worker  [facility]  [phone]

## 2024-02-15 ENCOUNTER — PATIENT OUTREACH (OUTPATIENT)
Dept: NURSING | Facility: CLINIC | Age: 84
End: 2024-02-15
Payer: COMMERCIAL

## 2024-02-15 NOTE — LETTER
[facility] CARE COORDINATION  [address]  [facility] [address]    February 15, 2024    Lawrence Pemberton  [address]   [facility] [address]      Dear Lawrence,    I am a clinic care coordinator who works with Roberta Delong MD with the [facility]. I have been trying to reach you recently to introduce Clinic Care Coordination. Below is a description of clinic care coordination and how I can further assist you.       The clinic care coordination team is made up of a registered nurse, , financial resource worker and community health worker who understand the health care system. The goal of clinic care coordination is to help you manage your health and improve access to the health care system. Our team works alongside your provider to assist you in determining your health and social needs. We can help you obtain health care and community resources, providing you with necessary information and education. We can work with you through any barriers and develop a care plan that helps coordinate and strengthen the communication between you and your care team.  Our services are voluntary and are offered without charge to you personally.    Please feel free to contact me with any questions or concerns regarding care coordination and what we can offer.      We are focused on providing you with the highest-quality healthcare experience possible.    Sincerely,     CHARLIE MilesN, RN, PHN   Care Coordinator-Ambulatory Care Management  [facility]  [phone]

## 2024-02-15 NOTE — PROGRESS NOTES
Clinic Care Coordination Contact  [facility]/Voicemail    Clinical Data: Care Coordinator Outreach    Outreach Documentation Number of Outreach Attempt   2/15/2024   2:37 PM 1       Left message on patient's voicemail with call back information and requested return call.    Plan: Care Coordinator will send care coordination introduction letter with care coordinator contact information and explanation of care coordination services via [employer]. Care Coordinator will try to reach patient again in 3-5 business days.    CHARLIE MilesN, RN, PHN   Care Coordinator-Ambulatory Care Management  [facility]  [phone]

## 2024-02-20 ENCOUNTER — OFFICE VISIT (OUTPATIENT)
Dept: FAMILY MEDICINE | Facility: CLINIC | Age: 84
End: 2024-02-20
Payer: COMMERCIAL

## 2024-02-20 VITALS
HEIGHT: 65 IN | WEIGHT: 146 LBS | RESPIRATION RATE: 16 BRPM | OXYGEN SATURATION: 96 % | DIASTOLIC BLOOD PRESSURE: 62 MMHG | SYSTOLIC BLOOD PRESSURE: 112 MMHG | BODY MASS INDEX: 24.32 KG/M2 | HEART RATE: 72 BPM | TEMPERATURE: 97.9 F

## 2024-02-20 DIAGNOSIS — E78.5 HYPERLIPIDEMIA WITH TARGET LDL LESS THAN 130: ICD-10-CM

## 2024-02-20 DIAGNOSIS — M54.50 CHRONIC LEFT-SIDED LOW BACK PAIN WITHOUT SCIATICA: ICD-10-CM

## 2024-02-20 DIAGNOSIS — G89.29 CHRONIC LEFT-SIDED LOW BACK PAIN WITHOUT SCIATICA: ICD-10-CM

## 2024-02-20 DIAGNOSIS — F33.1 MAJOR DEPRESSIVE DISORDER, RECURRENT, MODERATE (H): Primary | ICD-10-CM

## 2024-02-20 DIAGNOSIS — F41.1 ANXIETY STATE: ICD-10-CM

## 2024-02-20 DIAGNOSIS — R26.81 GAIT INSTABILITY: ICD-10-CM

## 2024-02-20 PROBLEM — M46.1 SACROILIITIS (H): Status: RESOLVED | Noted: 2020-03-03 | Resolved: 2024-02-20

## 2024-02-20 PROCEDURE — 99214 OFFICE O/P EST MOD 30 MIN: CPT | Performed by: FAMILY MEDICINE

## 2024-02-20 RX ORDER — RESPIRATORY SYNCYTIAL VIRUS VACCINE 120MCG/0.5
0.5 KIT INTRAMUSCULAR ONCE
Qty: 1 EACH | Refills: 0 | Status: CANCELLED | OUTPATIENT
Start: 2024-02-20 | End: 2024-02-20

## 2024-02-20 RX ORDER — ESCITALOPRAM OXALATE 20 MG/1
20 TABLET ORAL DAILY
Qty: 90 TABLET | Refills: 1 | Status: SHIPPED | OUTPATIENT
Start: 2024-02-20 | End: 2024-06-12

## 2024-02-20 RX ORDER — ATORVASTATIN CALCIUM 20 MG/1
20 TABLET, FILM COATED ORAL DAILY
Qty: 90 TABLET | Refills: 1 | Status: SHIPPED | OUTPATIENT
Start: 2024-02-20 | End: 2024-06-12

## 2024-02-20 ASSESSMENT — ANXIETY QUESTIONNAIRES
4. TROUBLE RELAXING: NOT AT ALL
GAD7 TOTAL SCORE: 3
1. FEELING NERVOUS, ANXIOUS, OR ON EDGE: NOT AT ALL
7. FEELING AFRAID AS IF SOMETHING AWFUL MIGHT HAPPEN: SEVERAL DAYS
6. BECOMING EASILY ANNOYED OR IRRITABLE: SEVERAL DAYS
IF YOU CHECKED OFF ANY PROBLEMS ON THIS QUESTIONNAIRE, HOW DIFFICULT HAVE THESE PROBLEMS MADE IT FOR YOU TO DO YOUR WORK, TAKE CARE OF THINGS AT HOME, OR GET ALONG WITH OTHER PEOPLE: NOT DIFFICULT AT ALL
8. IF YOU CHECKED OFF ANY PROBLEMS, HOW DIFFICULT HAVE THESE MADE IT FOR YOU TO DO YOUR WORK, TAKE CARE OF THINGS AT HOME, OR GET ALONG WITH OTHER PEOPLE?: NOT DIFFICULT AT ALL
3. WORRYING TOO MUCH ABOUT DIFFERENT THINGS: SEVERAL DAYS
5. BEING SO RESTLESS THAT IT IS HARD TO SIT STILL: NOT AT ALL
GAD7 TOTAL SCORE: 3
GAD7 TOTAL SCORE: 3
7. FEELING AFRAID AS IF SOMETHING AWFUL MIGHT HAPPEN: SEVERAL DAYS
2. NOT BEING ABLE TO STOP OR CONTROL WORRYING: NOT AT ALL

## 2024-02-20 ASSESSMENT — PATIENT HEALTH QUESTIONNAIRE - PHQ9
SUM OF ALL RESPONSES TO PHQ QUESTIONS 1-9: 3
10. IF YOU CHECKED OFF ANY PROBLEMS, HOW DIFFICULT HAVE THESE PROBLEMS MADE IT FOR YOU TO DO YOUR WORK, TAKE CARE OF THINGS AT HOME, OR GET ALONG WITH OTHER PEOPLE: NOT DIFFICULT AT ALL
SUM OF ALL RESPONSES TO PHQ QUESTIONS 1-9: 3

## 2024-02-20 ASSESSMENT — PAIN SCALES - GENERAL: PAINLEVEL: SEVERE PAIN (6)

## 2024-02-20 NOTE — PROGRESS NOTES
Assessment & Plan       ICD-10-CM    1. Major depressive disorder, recurrent, moderate (H)  F33.1 escitalopram (LEXAPRO) 20 MG tablet      2. Anxiety state  F41.1 escitalopram (LEXAPRO) 20 MG tablet      3. Gait instability  R26.81 Pain Management  Referral     Physical Therapy Referral      4. Chronic left-sided low back pain without sciatica  M54.50 Pain Management  Referral    G89.29 Physical Therapy Referral      5. Hyperlipidemia with target LDL less than 130  E78.5 atorvastatin (LIPITOR) 20 MG tablet         MDD/anxiety- Mood/anxiety symptoms under good control with increased dose of lexapro ~6 wks ago- increased from 10mg/d to 20mg/d.  She feels more calm and a bit less down on the higher dose.  Still very bothered by her back pain/mobility, though, so that still effects mood.  No side effects.  Will continue at the lexapro 20mg/day dose. Refills sent.  Continue every six month follow-up- next as wellness visit in addition.    LBP/gait instability-   Pt bothered mostly be left lower/sciatica area back pain, did not schedule PT after last visit- may have missed the call she thinks.  Reviewed chart, last saw pain clinic with Dr. Bell in 12/19, who rec PT/pool, and follow-up for potential epidural injection if conservative cares not helping.  --Referred again for PT, and she'll call to try and schedule with Ivone - for both LBP and gait/mobility issues.  --Will also refer for follow-up at pain clinic as it's been several yrs now, and see if other recommendations.          Return in about 6 months (around 8/20/2024) for Preventive/Wellness Visit, Depression, Anxiety, back pain/gait/mobility issues.      I spent a total of 35 minutes on the day of the visit.   Time spent by me doing chart review, history and exam, documentation and further activities per the note              Subjective   Lawrence is a 83 year old, presenting for the following health issues:  Depression        2/20/2024     "11:21 AM   Additional Questions   Roomed by Miya BARNHART   Accompanied by self     History of Present Illness       Mental Health Follow-up:  Patient presents to follow-up on Depression & Anxiety.Patient's depression since last visit has been:  Better  The patient is not having other symptoms associated with depression.  Patient's anxiety since last visit has been:  Better  The patient is not having other symptoms associated with anxiety.  Any significant life events: No  Patient is not feeling anxious or having panic attacks.  Patient has no concerns about alcohol or drug use.      Last seen on 1/10/24, with worsening mood sx's.  Increased lexapro from 10mg/d to 20mg/d.  No se's on the higher dose.      Thinks it helped in terms of calming her down.  Mood is a bit better.  The back pain is still tough, though.  Worse in the morning, helps to move around a bit.      Also referred for PT for gait instability/back pain- she hasn't done it yet- hasn't scheduled.    Taking tylenol 1 tab 1-2x/day.        Review of Systems  Constitutional, neuro, ENT, endocrine, pulmonary, cardiac, gastrointestinal, genitourinary, musculoskeletal, integument and psychiatric systems are negative, except as otherwise noted.      Objective    /62 (BP Location: Right arm, Patient Position: Sitting, Cuff Size: Adult Regular)   Pulse 72   Temp 97.9  F (36.6  C) (Temporal)   Resp 16   Ht 1.638 m (5' 4.5\")   Wt 66.2 kg (146 lb)   SpO2 96%   BMI 24.67 kg/m    Body mass index is 24.67 kg/m .  Physical Exam   GENERAL: alert and no distress  NECK: no adenopathy, no asymmetry, masses, or scars  RESP: lungs clear to auscultation - no rales, rhonchi or wheezes  CV: regular rate and rhythm, normal S1 S2, no S3 or S4, no murmur, click or rub, no peripheral edema  MS: no gross musculoskeletal defects noted, no edema  Psych: full range affect, normal speech and grooming, judgement and insight intact             Signed Electronically by: Roberta" Rama Delong MD    Answers submitted by the patient for this visit:  Patient Health Questionnaire (Submitted on 2/20/2024)  If you checked off any problems, how difficult have these problems made it for you to do your work, take care of things at home, or get along with other people?: Not difficult at all  PHQ9 TOTAL SCORE: 3  SHARMAINE-7 (Submitted on 2/20/2024)  SHARMAINE 7 TOTAL SCORE: 3  Depression / Anxiety Questionnaire (Submitted on 2/20/2024)  Chief Complaint: Chronic problems general questions HPI Form  Depression/Anxiety: Depression & Anxiety  Depression & Anxiety (Submitted on 2/20/2024)  Chief Complaint: Chronic problems general questions HPI Form  Status since last visit:: better  Anxiety since last: : better  Other associated symptoms of depression:: No  Other associated symotome: : No  Significant life event: : No  Anxious:: No  Current substance use:: No  General Questionnaire (Submitted on 2/20/2024)  Chief Complaint: Chronic problems general questions HPI Form

## 2024-03-25 ENCOUNTER — PATIENT OUTREACH (OUTPATIENT)
Dept: CARE COORDINATION | Facility: CLINIC | Age: 84
End: 2024-03-25
Payer: COMMERCIAL

## 2024-03-25 NOTE — PROGRESS NOTES
Clinic Care Coordination Contact  [facility]/Voicemail    Clinical Data: Care Coordinator Outreach    Outreach Documentation Number of Outreach Attempt   2/15/2024   2:37 PM 1   3/25/2024   3:23 PM 2       Left message on patient's voicemail with call back information and requested return call.    Plan: Care Coordinator sent care coordination introduction letter on 2/15/24 via [employer]. Care Coordinator will do no further outreaches at this time. FYI to MD.    Dinah Chappell, BSN, RN, PHN   Care Coordinator-Ambulatory Care Management  [facility]  [phone]

## 2024-04-04 ENCOUNTER — THERAPY VISIT (OUTPATIENT)
Dept: PHYSICAL THERAPY | Facility: CLINIC | Age: 84
End: 2024-04-04
Payer: COMMERCIAL

## 2024-04-04 DIAGNOSIS — G89.29 CHRONIC LEFT-SIDED LOW BACK PAIN WITHOUT SCIATICA: Primary | ICD-10-CM

## 2024-04-04 DIAGNOSIS — M54.50 CHRONIC LEFT-SIDED LOW BACK PAIN WITHOUT SCIATICA: Primary | ICD-10-CM

## 2024-04-04 PROCEDURE — 97161 PT EVAL LOW COMPLEX 20 MIN: CPT | Mod: GP | Performed by: PHYSICAL THERAPIST

## 2024-04-04 PROCEDURE — 97112 NEUROMUSCULAR REEDUCATION: CPT | Mod: GP | Performed by: PHYSICAL THERAPIST

## 2024-04-04 PROCEDURE — 97110 THERAPEUTIC EXERCISES: CPT | Mod: GP | Performed by: PHYSICAL THERAPIST

## 2024-04-04 NOTE — PROGRESS NOTES
PHYSICAL THERAPY EVALUATION  Type of Visit: Evaluation    See electronic medical record for Abuse and Falls Screening details.    Subjective       Presenting condition or subjective complaint: back pain that began 20 years ago  Date of onset: 01/10/24    Relevant medical history: Osteoarthritis   Dates & types of surgery:      Prior diagnostic imaging/testing results: MRI     Prior therapy history for the same diagnosis, illness or injury: Yes 4-5 years ago    Prior Level of Function  Transfers: Independent  Ambulation: Assistive equipment, using hiking poles for 3 mile hikes  ADL: Independent  IADL:     Stiffness with sitting.  Sharp pain on the left greater than right with walking and standing.  Has been riding the bike as well.  Sleeping is fine.  Wearing a heel lift intermittently    Living Environment  Social support:     Type of home:     Stairs to enter the home:         Ramp:     Stairs inside the home:         Help at home:    Equipment owned:       Employment:      Hobbies/Interests:      Patient goals for therapy: better able to walk    Pain assessment: Pain present  See objective evaluation for additional pain details     Objective   LUMBAR SPINE EVALUATION  PAIN: Pain Location: lumbar spine and hip  INTEGUMENTARY (edema, incisions): WNL  POSTURE:   GAIT:   Weightbearing Status: WBAT  Assistive Device(s): Cane (single end)  Gait Deviations: Base of support decreased  BALANCE/PROPRIOCEPTION: Single Leg Stance Eyes Open (seconds): 2  WEIGHTBEARING ALIGNMENT: Lumbar/Pelvic WB Alignment:Iliac crest high R  NON-WEIGHTBEARING ALIGNMENT:    ROM:   (Degrees) Left AROM Left PROM  Right AROM Right PROM   Hip Flexion  110  110   Hip Extension       Hip Abduction       Hip Adduction       Hip Internal Rotation       Hip External Rotation  limited  limited   Knee Flexion       Knee Extension       Lumbar Side glide     Lumbar Flexion Fingers to ankles   Lumbar Extension 25%   Pain:   End feel:   PELVIC/SI SCREEN:    STRENGTH:     MYOTOMES:    Left Right   T12-L3 (Hip Flexion)     L2-4 (Quads)      L4 (Ankle DF)     L5 (Great Toe Ext)     S1 (Toe Raise)       DTR S:   CORD SIGNS:   DERMATOMES:   NEURAL TENSION: Lumbar WNL  FLEXIBILITY:  decreased hip extension  LUMBAR/HIP Special Tests:    PELVIS/SI SPECIAL TESTS:   FUNCTIONAL TESTS:   PALPATION:   SPINAL SEGMENTAL CONCLUSIONS:       Assessment & Plan   CLINICAL IMPRESSIONS  Medical Diagnosis: back pain    Treatment Diagnosis: back pain   Impression/Assessment:     Clinical Decision Making (Complexity):  Clinical Presentation: Stable/Uncomplicated  Clinical Presentation Rationale: based on medical and personal factors listed in PT evaluation  Clinical Decision Making (Complexity): Low complexity    PLAN OF CARE  Treatment Interventions:  Interventions: Neuromuscular Re-education, Therapeutic Activity, Therapeutic Exercise    Long Term Goals     PT Goal 1  Goal Identifier: walking  Goal Description: patient able to walk outdoors for 10 minutes without an assistive device  Rationale: to maximize safety and independence within the community  Goal Progress: currently walking with a cane and feels unstable without this  Target Date: 07/02/24      Frequency of Treatment: 2 times a month  Duration of Treatment: 2 months    Recommended Referrals to Other Professionals:   Education Assessment:   Learner/Method: Patient;Reading;Listening;Demonstration;Pictures/Video;No Barriers to Learning    Risks and benefits of evaluation/treatment have been explained.   Patient/Family/caregiver agrees with Plan of Care.     Evaluation Time:     PT Eval, Low Complexity Minutes (22664): 15       Signing Clinician: Ivone Roman PT      [facility]                                                                                   OUTPATIENT PHYSICAL THERAPY      PLAN OF TREATMENT FOR OUTPATIENT REHABILITATION   Patient's Last Name, First Name, Lawrence Vale Date  of Birth:  1940   Provider's Name   [facility]   Medical Record No.  [id number]     Onset Date: 01/10/24  Start of Care Date: 04/04/24     Medical Diagnosis:  back pain      PT Treatment Diagnosis:  back pain Plan of Treatment  Frequency/Duration: 2 times a month/ 2 months    Certification date from 04/04/24 to 07/02/24         See note for plan of treatment details and functional goals     Ivone Roman, PT                         I CERTIFY THE NEED FOR THESE SERVICES FURNISHED UNDER        THIS PLAN OF TREATMENT AND WHILE UNDER MY CARE     (Physician attestation of this document indicates review and certification of the therapy plan).              Referring Provider:  Roberta Delong    Initial Assessment  See Epic Evaluation- Start of Care Date: 04/04/24

## 2024-04-18 ENCOUNTER — OFFICE VISIT (OUTPATIENT)
Dept: PALLIATIVE MEDICINE | Facility: CLINIC | Age: 84
End: 2024-04-18
Attending: FAMILY MEDICINE
Payer: COMMERCIAL

## 2024-04-18 VITALS — SYSTOLIC BLOOD PRESSURE: 109 MMHG | DIASTOLIC BLOOD PRESSURE: 67 MMHG | OXYGEN SATURATION: 96 % | HEART RATE: 70 BPM

## 2024-04-18 DIAGNOSIS — R26.81 GAIT INSTABILITY: ICD-10-CM

## 2024-04-18 DIAGNOSIS — M53.3 SACROILIAC JOINT DYSFUNCTION OF RIGHT SIDE: Primary | ICD-10-CM

## 2024-04-18 DIAGNOSIS — M79.18 MYOFASCIAL PAIN ON RIGHT SIDE: ICD-10-CM

## 2024-04-18 PROCEDURE — 99417 PROLNG OP E/M EACH 15 MIN: CPT | Performed by: NURSE PRACTITIONER

## 2024-04-18 PROCEDURE — 99205 OFFICE O/P NEW HI 60 MIN: CPT | Performed by: NURSE PRACTITIONER

## 2024-04-18 ASSESSMENT — PAIN SCALES - PAIN ENJOYMENT GENERAL ACTIVITY SCALE (PEG)
PEG_TOTALSCORE: 7
INTERFERED_ENJOYMENT_LIFE: 7
INTERFERED_GENERAL_ACTIVITY: 7
AVG_PAIN_PASTWEEK: 7
PEG_TOTALSCORE: 7
INTERFERED_GENERAL_ACTIVITY: 7
AVG_PAIN_PASTWEEK: 7
INTERFERED_ENJOYMENT_LIFE: 7

## 2024-04-18 ASSESSMENT — PAIN SCALES - GENERAL: PAINLEVEL: SEVERE PAIN (6)
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
## 2024-04-18 NOTE — PATIENT INSTRUCTIONS
Plan on initial consult 04/18/2024:  A multimodal plan was developed today to treat your pain.  Multimodal analgesia is a strategy that reduces reliance on opioids through the use of non-opioid analgesics and therapies that have different mechanisms of action.        Diagnostics: Patient declined updating lumbar MRI.  Reviewed lumbar MRI 2019.        Medications:  She is not interested in opioids/medications.      The following OTC pain medications may be helpful, use as directed: Voltaren Gel 1%, CBD products, Arnica products, Capsaicin products, Australian Dream Cream, Epson It,  Lidocaine Patch, Solanpas, Biofreeze, Aspercream, Tiger Balm and Jimmy Emu cream.  Apply heat or cold PRN.      Therapies:    Discussed Pain Psychology - consider in the future  Psychological treatments are also important part of pain management.  Understanding and managing the thoughts, emotions and behaviors that accompany the discomfort can help you cope more effectively with your pain and can actually reduce the intensity of your pain.      PHYSICAL THERAPY - Encourage patient to continue doing HEP taught at physical therapy.  Discussed the importance of core strengthening, ROM, stretching exercises with the patient and how each of these entities is important in decreasing pain.  Explained to the patient that the purpose of physical therapy is to teach the patient a home exercise program.  These exercises need to be performed every day in order to decrease pain and prevent future occurrences of pain.          Discussed Frequency Specific Microcurrent - handout provided  Treatment for Neuropathic Pain.   [facility] [phone] will also do acupuncture  [facility] [phone]  [facility] [phone]  [facility] [phone]  May be able to be billed as a chiropractic service depending on your insurance coverage.     Discussed Acupuncture.    Discussed TENs unit.      Interventions:  Schedule  right lumbar trigger point injections with Dr. Roberts.        Follow up: in clinic in [place] to evalulate R) lumbar myofacial pain after TPI.        AMIRA Rausch, RN, CNP, FNP  [facility] [place] Locations        ----------------------------------------------------------------  Clinic Number:  [phone]   Call with any questions about your care and for scheduling assistance.   Calls are returned Monday through Friday between 8 AM and 4:30 PM. We usually get back to you within 2 business days depending on the issue/request.    If we are prescribing your medications:  For opioid medication refills, call the clinic or send a Flowline message 7 days in advance.  Please include:  Name of requested medication  Name of the pharmacy.  For non-opioid medications, call your pharmacy directly to request a refill. Please allow 3-4 days to be processed.   Per [place] State Law:  All controlled substance prescriptions must be filled within 30 days of being written.    For those controlled substances allowing refills, pickup must occur within 30 days of last fill.      We believe regular attendance is key to your success in our program!    Any time you are unable to keep your appointment we ask that you call us at least 24 hours in advance to cancel.This will allow us to offer the appointment time to another patient.   Multiple missed appointments may lead to dismissal from the clinic.

## 2024-05-09 NOTE — PROGRESS NOTES
DISCHARGE  Reason for Discharge: Patient chooses to discontinue therapy.    Equipment Issued:     Discharge Plan: Patient to continue home program.   04/04/24 0500   Appointment Info   Signing clinician's name / credentials Ivone Bagley ATC   Total/Authorized Visits E+T(4)   Visits Used 1   Medical Diagnosis Chronic left-sided low back pain without sciatica, gait instability   PT Tx Diagnosis Chronic left-sided low back pain without sciatica, gait instability   Quick Adds Certification   Progress Note/Certification   Start of Care Date 04/04/24   Onset of illness/injury or Date of Surgery 01/10/24   Therapy Frequency 2 times a month   Predicted Duration 2 months   Certification date from 04/04/24   Certification date to 07/02/24   Progress Note Due Date 07/02/24   Progress Note Completed Date 04/04/24   PT Goal 1   Goal Identifier walking   Goal Description patient able to walk outdoors for 10 minutes without an assistive device   Rationale to maximize safety and independence within the community   Goal Progress currently walking with a cane and feels unstable without this   Target Date 07/02/24   Objective Measures   Objective Measures Objective Measure 1   Objective Measure 1   Objective Measure balance   Details unstable with increased sway with feet together and eyes closed   Treatment Interventions (PT)   Interventions Therapeutic Procedure/Exercise;Therapeutic Activity;Neuromuscular Re-education   Therapeutic Procedure/Exercise   PTRx Ther Proc 1 Shoulder Theraband Rows   PTRx Ther Proc 1 - Details 20 sec use green theraband   PTRx Ther Proc 2 Repeated Hip External Rotation with Overpressure in Sitting   PTRx Ther Proc 2 - Details 2x30 sec   PTRx Ther Proc 3 Double Knee to Chest   PTRx Ther Proc 3 - Details 2x30 sec   PTRx Ther Proc 4 Prone Arm Raise #2   PTRx Ther Proc 4 - Details 10 sec   Therapeutic Procedures: strength, endurance, ROM, flexibility minutes (69302) 10   Skilled Intervention verbal  cues for positioning   Patient Response/Progress understaning expressed   Therapeutic Activity   PTRx Ther Act 1 Education Sheet General   PTRx Ther Act 1 - Details continue with lift on the right   PTRx Ther Act 2 Low Risk Back Pain Education   PTRx Ther Act 2 - Details continue to walk   Therapeutic Activities: dynamic activities to improve functional performance minutes (60241) 5   Neuromuscular Re-education   PTRx Neuro Re-ed 1 Balance Feet Together Eyes Closed   PTRx Neuro Re-ed 1 - Details increased sway   PTRx Neuro Re-ed 2 Tandem Stance Static With Chair   PTRx Neuro Re-ed 2 - Details increased sway, do next to counter   PTRx Neuro Re-ed 3 Pallof Press   PTRx Neuro Re-ed 3 - Details 10 reps with green theraband   Neuromuscular re-ed of mvmt, balance, coord, kinesthetic sense, posture, proprioception minutes (51267) 8   Eval/Assessments   PT Eval, Low Complexity Minutes (96422) 15   Education   Learner/Method Patient;Reading;Listening;Demonstration;Pictures/Video;No Barriers to Learning   Plan   Home program see ptrx and printout   Plan for next session more strengthening   Total Session Time   Timed Code Treatment Minutes 23   Total Treatment Time (sum of timed and untimed services) 38         Referring Provider:  Roberta Delong

## 2024-06-11 ASSESSMENT — PATIENT HEALTH QUESTIONNAIRE - PHQ9
SUM OF ALL RESPONSES TO PHQ QUESTIONS 1-9: 0
SUM OF ALL RESPONSES TO PHQ QUESTIONS 1-9: 0

## 2024-06-11 NOTE — PROGRESS NOTES
Preventive Care Visit  [facility]  Roberta Delong MD, Family Medicine  Jun 12, 2024  {Provider  Link to SmartSet :632305}    {PROVIDER CHARTING PREFERENCE:181562}    Gema Bravo is a 83 year old, presenting for the following:  No chief complaint on file.    {(!) Visit Details have not yet been documented.  Please enter Visit Details and then use this list to pull in documentation. (Optional):658027}  {ROOMER if patient is in their first year of Medicare a vision screen is required click here to document the Vison screen and then refresh the note to pull in results  :013978}    Health Care Directive  Patient has a Health Care Directive on file  {(AWV REQUIRED) Advanced Care Planning Reviewed:547281}    [facility]    Wellness Visit Notes:  -Last mammo done 5/2023, previously discontinued (impression: negative) per chart review  -Last DEXA done 8/2023, due 8/2026 (impression: osteopenia) per chart review  -Last colon cancer screening done 1/2021 via FIT test, previously discontinued (impression: negative) per chart review  -Immunizations: RSV - Pt verbalized *** receive at pharmacy, COVID vaccine booster for 6931-0812 season - Pt *** to complete today  -Dermatology: Does Pt meet with dermatology regularly? Pt ***      ***  {MA/LPN/RN Pre-Provider Visit Orders- hCG/UA/Strep (Optional):834120}  {SUPERLIST (Optional):759938}  {additonal problems for provider to add (Optional):302977}      6/5/2023   General Health   How would you rate your overall physical health? Good         6/5/2023   Nutrition   At least 4 servings of fruits and vegetables/day Yes         5/23/2022   Exercise   Frequency of exercise: 4-5 days/week         1/10/2024   Social Factors   Worry food won't last until get money to buy more No   Food not last or not have enough money for food? No   Do you have housing?  Yes   Are you worried about losing your housing? No   Lack of transportation? No   Unable to get utilities  (heat,electricity)? No         6/5/2023   Activities of Daily Living- Home Safety   Needs help with the following daily activites NO assistance is needed   Safety concerns in the home Throw rugs in the hallway         7/17/2016   Dental   Dentist two times every year? Yes         6/5/2023   Hearing Screening   Hearing concerns? Difficulty following a conversation in a noisy restaurant or crowded room    Difficulty following dialogue in the theater    Difficult to understand a speaker at a public meeting or Shinto service    Find that men's voices are easier to understand than woman's            No data to display                   { Rooming Staff Patient needs a PHQ as part of the AWV.  Use this link to complete and then refresh the note to pull results Link to PHQ9 Assessment :397778}  {USE TO PULL IN PHQ RESULTS FOR TODAY:148059}        6/5/2023   Substance Use   Alcohol more than 3/day or more than 7/wk No     Social History     Tobacco Use    Smoking status: Never    Smokeless tobacco: Never   Vaping Use    Vaping status: Never Used   Substance Use Topics    Alcohol use: Never     Comment: on occ, glass wine/wk    Drug use: Never     {Provider  If there are gaps in the social history shown above, please follow the link to update and then refresh the note Link to Social and Substance History :293278}      5/11/2023   LAST FHS-7 RESULTS   1st degree relative breast or ovarian cancer No   Any relative bilateral breast cancer No   Any male have breast cancer No   Any ONE woman have BOTH breast AND ovarian cancer No   Any woman with breast cancer before 50yrs No   2 or more relatives with breast AND/OR ovarian cancer No   2 or more relatives with breast AND/OR bowel cancer No     {If any of the questions to the FHS7 are answered yes, consider referral for genetic counseling.    Additional indications for genetic referral include personal history of breast or ovarian cancer, genetic mutation in 1st degree  relative which increases risk of breast cancer including BRCA1, BRCA2, ABDOUL, PALB 2, TP53, CHEK2, PTEN, CDH1, STK11 (per ACS) and/or 1st degree relative with history of pancreatic or high-risk prostate cancer (per NCCN):950362}   {Mammogram Decision Support (Optional):057428}      {Link to Fracture Risk Assessment Tool (Optional):671449}    {Provider  Use the storyboard to review patient history, after sections have been marked as reviewed, refresh note to capture documentation:376153}  {Provider   REQUIRED AWV use this link to review and update sexual activity history  after section has been marked as reviewed, refresh note to capture documentation:507174}  Reviewed and updated as needed this visit by Provider                    {HISTORY OPTIONS (Optional):628274}  Current providers sharing in care for this patient include:  Patient Care Team:  Roberta Delong MD as PCP - General  Roberta Delong MD as Assigned PCP  Eleno Mae DO as Assigned Musculoskeletal Provider  May Calhoun MD as MD (Endocrinology, Diabetes, and Metabolism)  May Calhoun MD as Assigned Endocrinology Provider  Laura Pathak APRN CNP as Nurse Practitioner (Pain Medicine)    The following health maintenance items are reviewed in Epic and correct as of today:  Health Maintenance   Topic Date Due    ANNUAL REVIEW OF HM ORDERS  Never done    RSV VACCINE (Pregnancy & 60+) (1 - 1-dose 60+ series) Never done    COVID-19 Vaccine (8 - 2023-24 season) 02/27/2024    FALL RISK ASSESSMENT  06/06/2024    MEDICARE ANNUAL WELLNESS VISIT  06/06/2024    LIPID  06/13/2024    SHARMAINE ASSESSMENT  08/20/2024    PHQ-9  08/20/2024    DEXA  08/25/2026    DTAP/TDAP/TD IMMUNIZATION (2 - Td or Tdap) 11/14/2027    ADVANCE CARE PLANNING  06/15/2028    DEPRESSION ACTION PLAN  Completed    INFLUENZA VACCINE  Completed    Pneumococcal Vaccine: 65+ Years  Completed    ZOSTER IMMUNIZATION  Completed    IPV IMMUNIZATION  Aged Out     "HPV IMMUNIZATION  Aged Out    MENINGITIS IMMUNIZATION  Aged Out    RSV MONOCLONAL ANTIBODY  Aged Out    MAMMO SCREENING  Discontinued    COLORECTAL CANCER SCREENING  Discontinued       {ROS Picklists (Optional):210954}     Objective    Exam  There were no vitals taken for this visit.   Estimated body mass index is 24.67 kg/m  as calculated from the following:    Height as of 2/20/24: 1.638 m (5' 4.5\").    Weight as of 2/20/24: 66.2 kg (146 lb).    Physical Exam  {Exam Choices (Optional):588611}  {Provider  The Mini-Cog is incomplete, use link to complete and refresh note Link to Mini-Cog :742739}       No data to display              {A Mini-Cog total score of 0-2 suggests the possibility of dementia, score of 3-5 suggests no dementia:989054}         Signed Electronically by: Roberta Delong MD  {Email feedback regarding this note to [email or web address]   :803600}  Answers submitted by the patient for this visit:  Patient Health Questionnaire (Submitted on 6/11/2024)  PHQ9 TOTAL SCORE: 0    "

## 2024-06-12 ENCOUNTER — OFFICE VISIT (OUTPATIENT)
Dept: FAMILY MEDICINE | Facility: CLINIC | Age: 84
End: 2024-06-12
Attending: FAMILY MEDICINE
Payer: COMMERCIAL

## 2024-06-12 VITALS
DIASTOLIC BLOOD PRESSURE: 69 MMHG | OXYGEN SATURATION: 96 % | RESPIRATION RATE: 16 BRPM | TEMPERATURE: 98.1 F | SYSTOLIC BLOOD PRESSURE: 112 MMHG | HEART RATE: 70 BPM | HEIGHT: 64 IN | WEIGHT: 144.2 LBS | BODY MASS INDEX: 24.62 KG/M2

## 2024-06-12 DIAGNOSIS — M85.851 OSTEOPENIA OF BOTH HIPS: ICD-10-CM

## 2024-06-12 DIAGNOSIS — R29.3 POSTURAL IMBALANCE: ICD-10-CM

## 2024-06-12 DIAGNOSIS — F33.1 MAJOR DEPRESSIVE DISORDER, RECURRENT, MODERATE (H): ICD-10-CM

## 2024-06-12 DIAGNOSIS — M85.852 OSTEOPENIA OF BOTH HIPS: ICD-10-CM

## 2024-06-12 DIAGNOSIS — F41.1 ANXIETY STATE: ICD-10-CM

## 2024-06-12 DIAGNOSIS — E78.5 HYPERLIPIDEMIA WITH TARGET LDL LESS THAN 130: ICD-10-CM

## 2024-06-12 DIAGNOSIS — Z00.00 ENCOUNTER FOR MEDICARE ANNUAL WELLNESS EXAM: Primary | ICD-10-CM

## 2024-06-12 DIAGNOSIS — G89.29 CHRONIC BILATERAL LOW BACK PAIN WITHOUT SCIATICA: ICD-10-CM

## 2024-06-12 DIAGNOSIS — M54.50 CHRONIC BILATERAL LOW BACK PAIN WITHOUT SCIATICA: ICD-10-CM

## 2024-06-12 DIAGNOSIS — Z29.11 NEED FOR VACCINATION AGAINST RESPIRATORY SYNCYTIAL VIRUS: ICD-10-CM

## 2024-06-12 LAB
ALBUMIN SERPL BCG-MCNC: 4.4 G/DL (ref 3.5–5.2)
ALP SERPL-CCNC: 54 U/L (ref 40–150)
ALT SERPL W P-5'-P-CCNC: 17 U/L (ref 0–50)
ANION GAP SERPL CALCULATED.3IONS-SCNC: 10 MMOL/L (ref 7–15)
AST SERPL W P-5'-P-CCNC: 24 U/L (ref 0–45)
BILIRUB SERPL-MCNC: 0.4 MG/DL
BUN SERPL-MCNC: 19.3 MG/DL (ref 8–23)
CALCIUM SERPL-MCNC: 9.3 MG/DL (ref 8.8–10.2)
CHLORIDE SERPL-SCNC: 103 MMOL/L (ref 98–107)
CHOLEST SERPL-MCNC: 183 MG/DL
CREAT SERPL-MCNC: 0.99 MG/DL (ref 0.51–0.95)
DEPRECATED HCO3 PLAS-SCNC: 26 MMOL/L (ref 22–29)
EGFRCR SERPLBLD CKD-EPI 2021: 56 ML/MIN/1.73M2
FASTING STATUS PATIENT QL REPORTED: NO
FASTING STATUS PATIENT QL REPORTED: NO
GLUCOSE SERPL-MCNC: 84 MG/DL (ref 70–99)
HDLC SERPL-MCNC: 55 MG/DL
LDLC SERPL CALC-MCNC: 111 MG/DL
NONHDLC SERPL-MCNC: 128 MG/DL
POTASSIUM SERPL-SCNC: 4.5 MMOL/L (ref 3.4–5.3)
PROT SERPL-MCNC: 6.9 G/DL (ref 6.4–8.3)
SODIUM SERPL-SCNC: 139 MMOL/L (ref 135–145)
TRIGL SERPL-MCNC: 86 MG/DL

## 2024-06-12 PROCEDURE — 36415 COLL VENOUS BLD VENIPUNCTURE: CPT | Performed by: FAMILY MEDICINE

## 2024-06-12 PROCEDURE — 80061 LIPID PANEL: CPT | Performed by: FAMILY MEDICINE

## 2024-06-12 PROCEDURE — 99214 OFFICE O/P EST MOD 30 MIN: CPT | Mod: 25 | Performed by: FAMILY MEDICINE

## 2024-06-12 PROCEDURE — 80053 COMPREHEN METABOLIC PANEL: CPT | Performed by: FAMILY MEDICINE

## 2024-06-12 PROCEDURE — 90480 ADMN SARSCOV2 VAC 1/ONLY CMP: CPT | Performed by: FAMILY MEDICINE

## 2024-06-12 PROCEDURE — 91320 SARSCV2 VAC 30MCG TRS-SUC IM: CPT | Performed by: FAMILY MEDICINE

## 2024-06-12 PROCEDURE — G0439 PPPS, SUBSEQ VISIT: HCPCS | Performed by: FAMILY MEDICINE

## 2024-06-12 RX ORDER — ATORVASTATIN CALCIUM 20 MG/1
20 TABLET, FILM COATED ORAL DAILY
Qty: 90 TABLET | Refills: 3 | Status: SHIPPED | OUTPATIENT
Start: 2024-06-12

## 2024-06-12 RX ORDER — ESCITALOPRAM OXALATE 20 MG/1
20 TABLET ORAL DAILY
Qty: 90 TABLET | Refills: 1 | Status: SHIPPED | OUTPATIENT
Start: 2024-06-12

## 2024-06-12 RX ORDER — RESPIRATORY SYNCYTIAL VIRUS VACCINE 120MCG/0.5
0.5 KIT INTRAMUSCULAR ONCE
Qty: 1 EACH | Refills: 0 | Status: CANCELLED | OUTPATIENT
Start: 2024-06-12 | End: 2024-06-12

## 2024-06-12 SDOH — HEALTH STABILITY: PHYSICAL HEALTH: ON AVERAGE, HOW MANY DAYS PER WEEK DO YOU ENGAGE IN MODERATE TO STRENUOUS EXERCISE (LIKE A BRISK WALK)?: 5 DAYS

## 2024-06-12 SDOH — HEALTH STABILITY: PHYSICAL HEALTH: ON AVERAGE, HOW MANY MINUTES DO YOU ENGAGE IN EXERCISE AT THIS LEVEL?: 60 MIN

## 2024-06-12 ASSESSMENT — SOCIAL DETERMINANTS OF HEALTH (SDOH)
HOW OFTEN DO YOU GET TOGETHER WITH FRIENDS OR RELATIVES?: ONCE A WEEK
HOW OFTEN DO YOU GET TOGETHER WITH FRIENDS OR RELATIVES?: ONCE A WEEK

## 2024-06-12 ASSESSMENT — PAIN SCALES - GENERAL: PAINLEVEL: MODERATE PAIN (4)

## 2024-06-12 NOTE — PROGRESS NOTES
Preventive Care Visit  [facility]  Roberta Delong MD, Family Medicine  Jun 12, 2024      Assessment & Plan     Encounter for Medicare annual wellness exam  H-RN Wellness Visit Notes:  -Last mammo done 5/2023, (impression: negative) per chart review, continue or not per pt pref  -Last DEXA done 8/2023, due 8/2026 (impression: osteopenia) per chart review  -Last colon cancer screening done 1/2021 via FIT test, previously discontinued (impression: negative) per chart review  -Immunizations: RSV - Pt verbalized will receive at pharmacy, COVID vaccine booster for 9355-9364 season - Pt requesting to complete today  - PRIMARY CARE FOLLOW-UP SCHEDULING  - COVID-19 12+ (2023-24) (PFIZER)  - PRIMARY CARE FOLLOW-UP SCHEDULING; Future    Chronic bilateral low back pain without sciatica  Postural imbalance  Follow-up back pain and gait instability- currently back pain sx's aren't too bad, always there some, but comes in waves/flares about every couple months. She saw Ivone (PT) once, pt felt she was not encouraged to return, though note discussed goals of working on strengthening/walking 10 min outside without assistive device at next appts.  Pt has been doing some of the exercises, feels they are helpful. Tends to do them with flares- knows she should probably keep them up to lessen flares- will try. Stretching and some strengthening.  Didn't focus much on her mobility. She continues to use the cane to keep her more level, for a couple years. Uses hiking poles (2) on walks, for many yrs, uses cane around house or on errands (unless she has a cart).  - PRIMARY CARE FOLLOW-UP SCHEDULING; Future    Major depressive disorder, recurrent, moderate (H)  Anxiety state  Mood/anxiety symptoms much better- had increased lexapro from 10mg/d to 20mg/d back ~1/24 when sx's were worse. She is wondering if we can go back down, but recommended continuing at this dose for ~12 months, then consider lowering back down  if still feeling much better.    No side effects.  Refills sent.  Continue every six month follow-up.   - escitalopram (LEXAPRO) 20 MG tablet; Take 1 tablet (20 mg) by mouth daily  - PRIMARY CARE FOLLOW-UP SCHEDULING; Future    Osteopenia of both hips  Following with Ismael, last dxa ~8/23, worsening hip bone density, so ismael had her switch from fosamax to reclast infusion- infusion in 1/24.  Pt will cont follow-up with endocrinology.  - PRIMARY CARE FOLLOW-UP SCHEDULING; Future    Hyperlipidemia with target LDL less than 130  Continues on lipitor 20mg/d, no se's.  Will check fasting labs today.  - Lipid panel reflex to direct LDL Fasting; Future  - Comprehensive metabolic panel (BMP + Alb, Alk Phos, ALT, AST, Total. Bili, TP); Future  - atorvastatin (LIPITOR) 20 MG tablet; Take 1 tablet (20 mg) by mouth daily  - Lipid panel reflex to direct LDL Fasting  - Comprehensive metabolic panel (BMP + Alb, Alk Phos, ALT, AST, Total. Bili, TP)         Follow-up Visit   Expected date:  Dec 12, 2024 (Approximate)      Follow Up Appointment Details:     Follow-up with whom?: Me    Follow-Up for what?: Chronic Disease f/u    Chronic Disease f/u:  Depression  General (Other)       Additional Details: Osteoporosis/Endo follow-up, back pain    How?: In Person             Follow-up Visit   Expected date:  Jun 19, 2025 (Approximate)      Follow Up Appointment Details:     Follow-up with whom?: PCP    Follow-Up for what?: Medicare Wellness    Welcome or Annual?: Annual Wellness    How?: In Person                       Patient has been advised of split billing requirements and indicates understanding: Yes        Counseling  Appropriate preventive services were discussed with this patient, including applicable screening as appropriate for fall prevention, nutrition, physical activity, Tobacco-use cessation, weight loss and cognition.  Checklist reviewing preventive services available has been given to the patient.  Reviewed patient's diet,  addressing concerns and/or questions.   The patient was provided with written information regarding signs of hearing loss.   Information on urinary incontinence and treatment options given to patient.               Gema Bravo is a 83 year old, presenting for the following:  Physical (AWV)        6/12/2024    10:06 AM   Additional Questions   Roomed by MYLENE Dorantes   Accompanied by N/A         Health Care Directive  Patient has a Health Care Directive on file  Advance care planning document is on file but is outdated.  Patient encouraged to updated.    HPI    Wellness Visit Notes:  -Last mammo done 5/2023, previously discontinued (impression: negative) per chart review  -Last DEXA done 8/2023, due 8/2026 (impression: osteopenia) per chart review  -Last colon cancer screening done 1/2021 via FIT test, previously discontinued (impression: negative) per chart review  -Immunizations: RSV - Pt verbalized will receive at pharmacy, COVID vaccine booster for [id number] season - Pt requesting to complete today  ---------------------------------------------------------------------------------------    Follow-up back pain and gait instability-   Did PT just once, Ivone, who didn't think she needed more sessions, recommended the same exercises pt has done before.  Pt has been doing them, and feels the exercises are helping some.  Stretching and some strengthening.  Back pain? Sx's currently are not too bad.    Always there, flares maybe every couple months.   Does the exercises more if she has more sx's. Knows she should do them preventively as well.  Gait? Uses the cane to keep her more level, for a couple years.  Uses hiking poles (2) on walks, for many yrs, uses cane around house or on errands (unless she has a cart).    MDD- has been better lately.  Low mood sx's comes and goes.  Lexapro increased in early 1/24 - to 20mg/d, feels like it's helping.  No se's. Pt wondering about when she could go down on dose- discussed  would wait a year as it's only been ~5-6 months.    DXA- following with Ismael, last dxa ~8/23, worsening hip bone density, so ismael had her switch from fosamax to reclast infusion- infusion in 1/24.  No follow-up scheduled at this point.                6/12/2024   General Health   How would you rate your overall physical health? Good   Feel stress (tense, anxious, or unable to sleep) Not at all         6/12/2024   Nutrition   Diet: Regular (no restrictions)         6/12/2024   Exercise   Days per week of moderate/strenous exercise 5 days   Average minutes spent exercising at this level 60 min         6/12/2024   Social Factors   Frequency of gathering with friends or relatives Once a week   Worry food won't last until get money to buy more No   Food not last or not have enough money for food? No   Do you have housing?  Yes   Are you worried about losing your housing? No   Lack of transportation? No   Unable to get utilities (heat,electricity)? No         6/12/2024   Fall Risk   Fallen 2 or more times in the past year? No    No   Trouble with walking or balance? Yes    Yes   Gait Speed Test (Document in seconds) 5.85   Gait Speed Test Interpretation Greater than 5.01 seconds - ABNORMAL          6/12/2024   Activities of Daily Living- Home Safety   Needs help with the following daily activites None of the above   Safety concerns in the home None of the above         6/12/2024   Dental   Dentist two times every year? Yes         6/12/2024   Hearing Screening   Hearing concerns? (!) I NEED TO ASK PEOPLE TO SPEAK UP OR REPEAT THEMSELVES.    (!) IT'S HARD TO FOLLOW A CONVERSATION IN A NOISY RESTAURANT OR CROWDED ROOM.    (!) TROUBLE UNDESTANDING A SPEAKER IN A PUBLIC MEETING OR Taoist SERVICE.    (!) TROUBLE FOLLOWING DIALOGUE IN THE THEATHER.         6/12/2024   Driving Risk Screening   Patient/family members have concerns about driving No         6/12/2024   General Alertness/Fatigue Screening   Have you been more  tired than usual lately? No         6/12/2024   Urinary Incontinence Screening   Bothered by leaking urine in past 6 months Yes          Today's PHQ-9 Score:       6/11/2024     9:49 PM   PHQ-9 SCORE   PHQ-9 Total Score MyChart 0   PHQ-9 Total Score 0         6/12/2024   Substance Use   Alcohol more than 3/day or more than 7/wk No   Do you have a current opioid prescription? No   How severe/bad is pain from 1 to 10? 4/10   Do you use any other substances recreationally? No     Social History     Tobacco Use    Smoking status: Never    Smokeless tobacco: Never   Vaping Use    Vaping status: Never Used   Substance Use Topics    Alcohol use: Never     Comment: on occ, glass wine/wk    Drug use: Never           5/11/2023   LAST FHS-7 RESULTS   1st degree relative breast or ovarian cancer No   Any relative bilateral breast cancer No   Any male have breast cancer No   Any ONE woman have BOTH breast AND ovarian cancer No   Any woman with breast cancer before 50yrs No   2 or more relatives with breast AND/OR ovarian cancer No   2 or more relatives with breast AND/OR bowel cancer No        Mammogram Screening - After age 74- determine frequency with patient based on health status, life expectancy and patient goals          Reviewed and updated as needed this visit by Provider   Tobacco  Allergies  Meds  Problems  Med Hx  Surg Hx  Fam Hx            Lab work is in process  Labs reviewed in EPIC  BP Readings from Last 3 Encounters:   06/12/24 112/69   04/18/24 109/67   02/20/24 112/62    Wt Readings from Last 3 Encounters:   06/12/24 65.4 kg (144 lb 3.2 oz)   02/20/24 66.2 kg (146 lb)   01/10/24 66.7 kg (147 lb)                  Current providers sharing in care for this patient include:  Patient Care Team:  Roberta Delong MD as PCP - General  Roberta Delong MD as Assigned PCP  Eleno Mae DO as Assigned Musculoskeletal Provider  May Calhoun MD as MD (Endocrinology, Diabetes, and  "Metabolism)  May Calhoun MD as Assigned Endocrinology Provider  Laura Schwab APRN CNP as Nurse Practitioner (Pain Medicine)    The following health maintenance items are reviewed in Epic and correct as of today:  Health Maintenance   Topic Date Due    ANNUAL REVIEW OF HM ORDERS  Never done    RSV VACCINE (Pregnancy & 60+) (1 - 1-dose 60+ series) Never done    SHARMAINE ASSESSMENT  08/20/2024    PHQ-9  12/12/2024    MEDICARE ANNUAL WELLNESS VISIT  06/12/2025    LIPID  06/12/2025    FALL RISK ASSESSMENT  06/12/2025    DEXA  08/25/2026    DTAP/TDAP/TD IMMUNIZATION (2 - Td or Tdap) 11/14/2027    ADVANCE CARE PLANNING  06/12/2029    DEPRESSION ACTION PLAN  Completed    INFLUENZA VACCINE  Completed    Pneumococcal Vaccine: 65+ Years  Completed    ZOSTER IMMUNIZATION  Completed    COVID-19 Vaccine  Completed    IPV IMMUNIZATION  Aged Out    HPV IMMUNIZATION  Aged Out    MENINGITIS IMMUNIZATION  Aged Out    RSV MONOCLONAL ANTIBODY  Aged Out    MAMMO SCREENING  Discontinued    COLORECTAL CANCER SCREENING  Discontinued         Review of Systems  Constitutional, neuro, ENT, endocrine, pulmonary, cardiac, gastrointestinal, genitourinary, musculoskeletal, integument and psychiatric systems are negative, except as otherwise noted.     Objective    Exam  /69 (BP Location: Right arm, Patient Position: Sitting, Cuff Size: Adult Regular)   Pulse 70   Temp 98.1  F (36.7  C) (Temporal)   Resp 16   Ht 1.619 m (5' 3.75\")   Wt 65.4 kg (144 lb 3.2 oz)   SpO2 96%   BMI 24.95 kg/m     Estimated body mass index is 24.95 kg/m  as calculated from the following:    Height as of this encounter: 1.619 m (5' 3.75\").    Weight as of this encounter: 65.4 kg (144 lb 3.2 oz).    Physical Exam  GENERAL: alert and no distress  EYES: Eyes grossly normal to inspection, PERRL and conjunctivae and sclerae normal  HENT: ear canals and TM's normal, nose and mouth without ulcers or lesions  NECK: no adenopathy, no asymmetry, masses, " or scars  RESP: lungs clear to auscultation - no rales, rhonchi or wheezes  BREAST: normal without masses, tenderness or nipple discharge and no palpable axillary masses or adenopathy  CV: regular rate and rhythm, normal S1 S2, no S3 or S4, no murmur, click or rub, no peripheral edema  ABDOMEN: soft, nontender, no hepatosplenomegaly, no masses and bowel sounds normal  MS: no gross musculoskeletal defects noted, no edema  SKIN: no suspicious lesions or rashes  NEURO: Normal strength and tone, mentation intact and speech normal  PSYCH: mentation appears normal, affect normal/bright        6/12/2024   Mini Cog   Clock Draw Score 2 Normal   3 Item Recall 2 objects recalled   Mini Cog Total Score 4              Signed Electronically by: Roberta Delong MD    Answers submitted by the patient for this visit:  Patient Health Questionnaire (Submitted on 6/11/2024)  PHQ9 TOTAL SCORE: 0

## 2024-06-12 NOTE — PATIENT INSTRUCTIONS
"Preventive Care Advice   This is general advice we often give to help people stay healthy. Your care team may have specific advice just for you. Please talk to your care team about your own preventive care needs.  Lifestyle  Exercise at least 150 minutes each week (30 minutes a day, 5 days a week).  Do muscle strengthening activities 2 days a week. These help control your weight and prevent disease.  No smoking.  Wear sunscreen to prevent skin cancer.  Have your home tested for radon every 2 to 5 years. Radon is a colorless, odorless gas that can harm your lungs. To learn more, go to www.health.[facility].mn. and search for \"Radon in Homes.\"  Keep guns unloaded and locked up in a safe place like a safe or gun vault, or, use a gun lock and hide the keys. Always lock away bullets separately. To learn more, visit [employer].mn.gov and search for \"safe gun storage.\"  Nutrition  Eat 5 or more servings of fruits and vegetables each day.  Try wheat bread, brown rice and whole grain pasta (instead of white bread, rice, and pasta).  Get enough calcium and vitamin D. Check the label on foods and aim for 100% of the RDA (recommended daily allowance).  Regular exams  Have a dental exam and cleaning every 6 months.  See your health care team every year to talk about:  Any changes in your health.  Any medicines your care team has prescribed.  Preventive care, family planning, and ways to prevent chronic diseases.  Shots (vaccines)   HPV shots (up to age 26), if you've never had them before.  Hepatitis B shots (up to age 59), if you've never had them before.  COVID-19 shot: Get this shot when it's due.  Flu shot: Get a flu shot every year.  Tetanus shot: Get a tetanus shot every 10 years.  Pneumococcal, hepatitis A, and RSV shots: Ask your care team if you need these based on your risk.  Shingles shot (for age 50 and up).  General health tests  Diabetes screening:  Starting at age 35, Get screened for diabetes at least every 3 years.  If " you are younger than age 35, ask your care team if you should be screened for diabetes.  Cholesterol test: At age 39, start having a cholesterol test every 5 years, or more often if advised.  Bone density scan (DEXA): At age 50, ask your care team if you should have this scan for osteoporosis (brittle bones).  Hepatitis C: Get tested at least once in your life.  Abdominal aortic aneurysm screening: Talk to your doctor about having this screening if you:  Have ever smoked; and  Are biologically male; and  Are between the ages of 65 and 75.  STIs (sexually transmitted infections)  Before age 24: Ask your care team if you should be screened for STIs.  After age 24: Get screened for STIs if you're at risk. You are at risk for STIs (including HIV) if:  You are sexually active with more than one person.  You don't use condoms every time.  You or a partner was diagnosed with a sexually transmitted infection.  If you are at risk for HIV, ask about PrEP medicine to prevent HIV.  Get tested for HIV at least once in your life, whether you are at risk for HIV or not.  Cancer screening tests  Cervical cancer screening: If you have a cervix, begin getting regular cervical cancer screening tests at age 21. Most people who have regular screenings with normal results can stop after age 65. Talk about this with your provider.  Breast cancer scan (mammogram): If you've ever had breasts, begin having regular mammograms starting at age 40. This is a scan to check for breast cancer.  Colon cancer screening: It is important to start screening for colon cancer at age 45.  Have a colonoscopy test every 10 years (or more often if you're at risk) Or, ask your provider about stool tests like a FIT test every year or Cologuard test every 3 years.  To learn more about your testing options, visit: www.[employer]alPerk/300263.pdf.  For help making a decision, visit: shalini/[email or web address].  Prostate cancer screening test: If you have a prostate and are age 55  to 69, ask your provider if you would benefit from a yearly prostate cancer screening test.  Lung cancer screening: If you are a current or former smoker age 50 to 80, ask your care team if ongoing lung cancer screenings are right for you.  For informational purposes only. Not to replace the advice of your health care provider. Copyright   2023 [employer]. All rights reserved. Clinically reviewed by the [facility] Transitions Program. [employer] 139658 - REV 04/24.  Preventing Falls: Care Instructions  Injuries and health problems such as trouble walking or poor eyesight can increase your risk of falling. So can some medicines. But there are things you can do to help prevent falls. You can exercise to get stronger. You can also arrange your home to make it safer.    Talk to your doctor about the medicines you take. Ask if any of them increase the risk of falls and whether they can be changed or stopped.   Try to exercise regularly. It can help improve your strength and balance. This can help lower your risk of falling.     Practice fall safety and prevention.    Wear low-heeled shoes that fit well and give your feet good support. Talk to your doctor if you have foot problems that make this hard.  Carry a cellphone or wear a medical alert device that you can use to call for help.  Use stepladders instead of chairs to reach high objects. Don't climb if you're at risk for falls. Ask for help, if needed.  Wear the correct eyeglasses, if you need them.    Make your home safer.    Remove rugs, cords, clutter, and furniture from walkways.  Keep your house well lit. Use night-lights in hallways and bathrooms.  Install and use sturdy handrails on stairways.  Wear nonskid footwear, even inside. Don't walk barefoot or in socks without shoes.    Be safe outside.    Use handrails, curb cuts, and ramps whenever possible.  Keep your hands free by using a shoulder bag or backpack.  Try to walk in well-lit  "areas. Watch out for uneven ground, changes in pavement, and debris.  Be careful in the winter. Walk on the grass or gravel when sidewalks are slippery. Use de-icer on steps and walkways. Add non-slip devices to shoes.    Put grab bars and nonskid mats in your shower or tub and near the toilet. Try to use a shower chair or bath bench when bathing.   Get into a tub or shower by putting in your weaker leg first. Get out with your strong side first. Have a phone or medical alert device in the bathroom with you.   Where can you learn more?  Go to https://www.HandInScan.net/patiented  Enter G117 in the search box to learn more about \"Preventing Falls: Care Instructions.\"  Current as of: July 17, 2023               Content Version: 14.0    5755-0171 [employer].   Care instructions adapted under license by your healthcare professional. If you have questions about a medical condition or this instruction, always ask your healthcare professional. [employer] disclaims any warranty or liability for your use of this information.      Hearing Loss: Care Instructions  Overview     Hearing loss is a sudden or slow decrease in how well you hear. It can range from slight to profound. Permanent hearing loss can occur with aging. It also can happen when you are exposed long-term to loud noise. Examples include listening to loud music, riding motorcycles, or being around other loud machines.  Hearing loss can affect your work and home life. It can make you feel lonely or depressed. You may feel that you have lost your independence. But hearing aids and other devices can help you hear better and feel connected to others.  Follow-up care is a key part of your treatment and safety. Be sure to make and go to all appointments, and call your doctor if you are having problems. It's also a good idea to know your test results and keep a list of the medicines you take.  How can you care for yourself at home?  Avoid " loud noises whenever possible. This helps keep your hearing from getting worse.  Always wear hearing protection around loud noises.  Wear a hearing aid as directed.  A professional can help you pick a hearing aid that will work best for you.  You can also get hearing aids over the counter for mild to moderate hearing loss.  Have hearing tests as your doctor suggests. They can show whether your hearing has changed. Your hearing aid may need to be adjusted.  Use other devices as needed. These may include:  Telephone amplifiers and hearing aids that can connect to a television, stereo, radio, or microphone.  Devices that use lights or vibrations. These alert you to the doorbell, a ringing telephone, or a baby monitor.  Television closed-captioning. This shows the words at the bottom of the screen. Most new TVs can do this.  TTY (text telephone). This lets you type messages back and forth on the telephone instead of talking or listening. These devices are also called TDD. When messages are typed on the keyboard, they are sent over the phone line to a receiving TTY. The message is shown on a monitor.  Use text messaging, social media, and email if it is hard for you to communicate by telephone.  Try to learn a listening technique called speechreading. It is not lipreading. You pay attention to people's gestures, expressions, posture, and tone of voice. These clues can help you understand what a person is saying. Face the person you are talking to, and have them face you. Make sure the lighting is good. You need to see the other person's face clearly.  Think about counseling if you need help to adjust to your hearing loss.  When should you call for help?  Watch closely for changes in your health, and be sure to contact your doctor if:    You think your hearing is getting worse.     You have new symptoms, such as dizziness or nausea.   Where can you learn more?  Go to https://www.healthwise.net/patiented  Enter R798 in the  "search box to learn more about \"Hearing Loss: Care Instructions.\"  Current as of: September 27, 2023               Content Version: 14.0    9314-3198 [employer].   Care instructions adapted under license by your healthcare professional. If you have questions about a medical condition or this instruction, always ask your healthcare professional. [employer] disclaims any warranty or liability for your use of this information.      Bladder Training: Care Instructions  Your Care Instructions     Bladder training is used to treat urge incontinence and stress incontinence. Urge incontinence means that the need to urinate comes on so fast that you can't get to a toilet in time. Stress incontinence means that you leak urine because of pressure on your bladder. For example, it may happen when you laugh, cough, or lift something heavy.  Bladder training can increase how long you can wait before you have to urinate. It can also help your bladder hold more urine. And it can give you better control over the urge to urinate.  It is important to remember that bladder training takes a few weeks to a few months to make a difference. You may not see results right away, but don't give up.  Follow-up care is a key part of your treatment and safety. Be sure to make and go to all appointments, and call your doctor if you are having problems. It's also a good idea to know your test results and keep a list of the medicines you take.  How can you care for yourself at home?  Work with your doctor to come up with a bladder training program that is right for you. You may use one or more of the following methods.  Delayed urination  In the beginning, try to keep from urinating for 5 minutes after you first feel the need to go.  While you wait, take deep, slow breaths to relax. Kegel exercises can also help you delay the need to go to the bathroom.  After some practice, when you can easily wait 5 minutes to urinate, " "try to wait 10 minutes before you urinate.  Slowly increase the waiting period until you are able to control when you have to urinate.  Scheduled urination  Empty your bladder when you first wake up in the morning.  Schedule times throughout the day when you will urinate.  Start by going to the bathroom every hour, even if you don't need to go.  Slowly increase the time between trips to the bathroom.  When you have found a schedule that works well for you, keep doing it.  If you wake up during the night and have to urinate, do it. Apply your schedule to waking hours only.  Kegel exercises  These tighten and strengthen pelvic muscles, which can help you control the flow of urine. (If doing these exercises causes pain, stop doing them and talk with your doctor.) To do Kegel exercises:  Squeeze your muscles as if you were trying not to pass gas. Or squeeze your muscles as if you were stopping the flow of urine. Your belly, legs, and buttocks shouldn't move.  Hold the squeeze for 3 seconds, then relax for 5 to 10 seconds.  Start with 3 seconds, then add 1 second each week until you are able to squeeze for 10 seconds.  Repeat the exercise 10 times a session. Do 3 to 8 sessions a day.  When should you call for help?  Watch closely for changes in your health, and be sure to contact your doctor if:    Your incontinence is getting worse.     You do not get better as expected.   Where can you learn more?  Go to https://www.[employer].net/patiented  Enter V684 in the search box to learn more about \"Bladder Training: Care Instructions.\"  Current as of: November 15, 2023               Content Version: 14.0    1719-6984 [employer].   Care instructions adapted under license by your healthcare professional. If you have questions about a medical condition or this instruction, always ask your healthcare professional. [employer] disclaims any warranty or liability for your use of this information.      "

## 2024-06-15 NOTE — RESULT ENCOUNTER NOTE
-Your cholesterol panel looks okay with a fairly low LDL (the bad cholesterol) and a normal HDL (the good cholesterol).   -Your comprehensive metabolic panel (CMP, which includes electrolyte levels, blood sugar levels, and kidney and liver function tests) looks okay, though your creatinine/GFR kidney levels are fine but a bit worse the last couple times.  We usually check them about yearly, but I'd probably check them again at your six month follow-up appointment as well just to monitor the trend.    Please let me know if you have any questions.  Best,   Branden Delong MD

## 2024-10-23 ENCOUNTER — TELEPHONE (OUTPATIENT)
Dept: FAMILY MEDICINE | Facility: CLINIC | Age: 84
End: 2024-10-23
Payer: COMMERCIAL

## 2024-10-23 DIAGNOSIS — G89.29 CHRONIC LEFT-SIDED LOW BACK PAIN WITHOUT SCIATICA: Primary | ICD-10-CM

## 2024-10-23 DIAGNOSIS — M54.50 CHRONIC LEFT-SIDED LOW BACK PAIN WITHOUT SCIATICA: Primary | ICD-10-CM

## 2024-10-23 DIAGNOSIS — M25.532 LEFT WRIST PAIN: ICD-10-CM

## 2024-10-23 DIAGNOSIS — M15.9 OSTEOARTHRITIS OF MULTIPLE JOINTS, UNSPECIFIED OSTEOARTHRITIS TYPE: ICD-10-CM

## 2024-10-23 DIAGNOSIS — R26.81 GAIT INSTABILITY: ICD-10-CM

## 2024-10-23 NOTE — TELEPHONE ENCOUNTER
Curbsided care coordination, who had some ideas, recommended care coordination referral to go over options.  Will place referral.  Triage- can you let her know I placed referral and they will be reaching out to her?  Thank you!  CW

## 2024-10-23 NOTE — TELEPHONE ENCOUNTER
Called pt and gave message from provider (see below). Pt expressed understanding and will wait for phonecall from care coordination. All questions were answered.     Jennifer MALHOTRA RN

## 2024-10-23 NOTE — TELEPHONE ENCOUNTER
S-(situation): Patient calling asking for a letter from Dr. Sloan to provide house keeping assistance for patient so that it can be covered by [employer] due to her chronic back pain.     B-(background): Has had back pain for 20 years.    A-(assessment): Back pain is making it hard to to some of the tasks around the house and patient thinks [employer] will help pay for some help if provider can write a letter.     R-(recommendations): Told her I would route over to Dr. Rodriguez to see what she can do.     Sharri White RN

## 2024-10-24 ENCOUNTER — PATIENT OUTREACH (OUTPATIENT)
Dept: CARE COORDINATION | Facility: CLINIC | Age: 84
End: 2024-10-24
Payer: COMMERCIAL

## 2024-10-24 NOTE — PROGRESS NOTES
Clinic Care Coordination Contact  Community Health Worker Initial Outreach    CHW Initial Information Gathering:  Referral Source: PCP  Preferred Hospital: [facility]  [phone]  Current living arrangement:: I live alone  Type of residence:: Private home - stairs  Community Resources: None  Supplies Currently Used at Home: None  Equipment Currently Used at Home: cane, straight (walking poles for outside)  Informal Support system:: Spouse, Friends, Family  No PCP office visit in Past Year: No  Transportation means:: Regular car  CHW Additional Questions  If ED/Hospital discharge, follow-up appointment scheduled as recommended?: N/A  Medication changes made following ED/Hospital discharge?: N/A  MyChart active?: Yes  Patient sent Social Drivers of Health questionnaire?: Yes    Patient accepts CC: Yes. Patient scheduled for assessment with PEPITO Nix RN, on 10/29/24 at 3:00 pm. Patient noted desire to discuss house cleaning resources.     Spoke to pt this morning:  House cleaning - long-standing back problems which make vacuuming, dusting, mopping floors difficult.Wonders if there are programs within [facility] to help with housekeeping. Indep with grocery shopping, and now is doing more online grocery shopping and then goes to . ADLs and IADLs - indep. Pt states they would likely be able to afford private pay options. Pt does not know of anyone in her personal network who would be able to assist with this.   Transportation - pt drives. Spouse doesn't.   Medications - affordable to pt  Housing - live in own home  Food - no [facility]  [place] benefits - [place]  Children live in [place] and [place]      Sola Valentin  Community Health Worker  [facility]  [phone]

## 2024-11-04 ENCOUNTER — PATIENT OUTREACH (OUTPATIENT)
Dept: CARE COORDINATION | Facility: CLINIC | Age: 84
End: 2024-11-04
Payer: COMMERCIAL

## 2024-11-04 NOTE — LETTER
[facility] CARE COORDINATION  [address]  [facility] [address]    November 4, 2024    Lawrence Pemberton  [address]   [place] [facility] [address]      Dear Lawrence,    I am a clinic care coordinator who works with Roberta Delong MD with the [facility]. I have been trying to reach you recently to introduce Clinic Care Coordination. Below is a description of clinic care coordination and how I can further assist you.       The clinic care coordination team is made up of a registered nurse, , financial resource worker and community health worker who understand the health care system. The goal of clinic care coordination is to help you manage your health and improve access to the health care system. Our team works alongside your provider to assist you in determining your health and social needs. We can help you obtain health care and community resources, providing you with necessary information and education. We can work with you through any barriers and develop a care plan that helps coordinate and strengthen the communication between you and your care team.  Our services are voluntary and are offered without charge to you personally.    Please feel free to contact me with any questions or concerns regarding care coordination and what we can offer.      We are focused on providing you with the highest-quality healthcare experience possible.    Sincerely,     CHARLIE MilesN, RN, PHN   Care Coordinator-Ambulatory Care Management  [facility]  [phone]

## 2024-11-04 NOTE — PROGRESS NOTES
Clinic Care Coordination Contact  [facility]/Voicemail    Clinical Data: Care Coordinator Outreach    Outreach Documentation Number of Outreach Attempt   11/4/2024   1:50 PM 1       Left message on patient's voicemail with call back information and requested return call.      Plan: Care Coordinator will send care coordination introduction letter with care coordinator contact information and explanation of care coordination services via [employer]. Care Coordinator will try to reach patient again in 3-5 business days.    CHARLIE MilesN, RN, PHN   Care Coordinator-Ambulatory Care Management  [facility]  [phone]

## 2024-11-07 NOTE — PROGRESS NOTES
Clinic Care Coordination Contact  [facility]/Voicemail    Clinical Data: Care Coordinator Outreach    Outreach Documentation Number of Outreach Attempt   11/4/2024   1:50 PM 1   11/7/2024   1:52 PM 2       Unable to leave a message due to: Someone picked up the phone but did not speak.      Plan: Care Coordinator sent care coordination introduction letter on 11/4/24 via [employer].  Will also send a [employer] message asking Patient if She still wishes to work with Care Coordination. Care Coordinator will do no further outreaches at this time.    CHARLIE MilesN, RN, PHN   Care Coordinator-Ambulatory Care Management  [facility]  [phone]

## 2024-12-17 ENCOUNTER — OFFICE VISIT (OUTPATIENT)
Dept: FAMILY MEDICINE | Facility: CLINIC | Age: 84
End: 2024-12-17
Attending: FAMILY MEDICINE
Payer: COMMERCIAL

## 2024-12-17 VITALS
TEMPERATURE: 97.2 F | SYSTOLIC BLOOD PRESSURE: 100 MMHG | HEIGHT: 64 IN | RESPIRATION RATE: 16 BRPM | DIASTOLIC BLOOD PRESSURE: 58 MMHG | OXYGEN SATURATION: 98 % | WEIGHT: 141.9 LBS | HEART RATE: 96 BPM | BODY MASS INDEX: 24.22 KG/M2

## 2024-12-17 DIAGNOSIS — G89.29 CHRONIC BILATERAL LOW BACK PAIN WITHOUT SCIATICA: Primary | ICD-10-CM

## 2024-12-17 DIAGNOSIS — Z29.11 NEED FOR VACCINATION AGAINST RESPIRATORY SYNCYTIAL VIRUS: ICD-10-CM

## 2024-12-17 DIAGNOSIS — M85.852 OSTEOPENIA OF BOTH HIPS: ICD-10-CM

## 2024-12-17 DIAGNOSIS — M54.50 CHRONIC BILATERAL LOW BACK PAIN WITHOUT SCIATICA: Primary | ICD-10-CM

## 2024-12-17 DIAGNOSIS — F33.1 MAJOR DEPRESSIVE DISORDER, RECURRENT, MODERATE (H): ICD-10-CM

## 2024-12-17 DIAGNOSIS — R26.81 GAIT INSTABILITY: ICD-10-CM

## 2024-12-17 DIAGNOSIS — R29.3 POSTURAL IMBALANCE: ICD-10-CM

## 2024-12-17 DIAGNOSIS — G89.29 CHRONIC LEFT-SIDED LOW BACK PAIN WITHOUT SCIATICA: ICD-10-CM

## 2024-12-17 DIAGNOSIS — M54.50 CHRONIC LEFT-SIDED LOW BACK PAIN WITHOUT SCIATICA: ICD-10-CM

## 2024-12-17 DIAGNOSIS — M85.851 OSTEOPENIA OF BOTH HIPS: ICD-10-CM

## 2024-12-17 DIAGNOSIS — F41.1 ANXIETY STATE: ICD-10-CM

## 2024-12-17 DIAGNOSIS — M15.9 OSTEOARTHRITIS OF MULTIPLE JOINTS, UNSPECIFIED OSTEOARTHRITIS TYPE: ICD-10-CM

## 2024-12-17 PROCEDURE — 99214 OFFICE O/P EST MOD 30 MIN: CPT | Performed by: FAMILY MEDICINE

## 2024-12-17 PROCEDURE — G2211 COMPLEX E/M VISIT ADD ON: HCPCS | Performed by: FAMILY MEDICINE

## 2024-12-17 RX ORDER — ESCITALOPRAM OXALATE 20 MG/1
20 TABLET ORAL DAILY
Qty: 90 TABLET | Refills: 1 | Status: SHIPPED | OUTPATIENT
Start: 2024-12-17

## 2024-12-17 ASSESSMENT — ANXIETY QUESTIONNAIRES
7. FEELING AFRAID AS IF SOMETHING AWFUL MIGHT HAPPEN: NOT AT ALL
7. FEELING AFRAID AS IF SOMETHING AWFUL MIGHT HAPPEN: NOT AT ALL
4. TROUBLE RELAXING: NOT AT ALL
GAD7 TOTAL SCORE: 3
6. BECOMING EASILY ANNOYED OR IRRITABLE: SEVERAL DAYS
GAD7 TOTAL SCORE: 3
IF YOU CHECKED OFF ANY PROBLEMS ON THIS QUESTIONNAIRE, HOW DIFFICULT HAVE THESE PROBLEMS MADE IT FOR YOU TO DO YOUR WORK, TAKE CARE OF THINGS AT HOME, OR GET ALONG WITH OTHER PEOPLE: NOT DIFFICULT AT ALL
1. FEELING NERVOUS, ANXIOUS, OR ON EDGE: SEVERAL DAYS
8. IF YOU CHECKED OFF ANY PROBLEMS, HOW DIFFICULT HAVE THESE MADE IT FOR YOU TO DO YOUR WORK, TAKE CARE OF THINGS AT HOME, OR GET ALONG WITH OTHER PEOPLE?: NOT DIFFICULT AT ALL
5. BEING SO RESTLESS THAT IT IS HARD TO SIT STILL: NOT AT ALL
2. NOT BEING ABLE TO STOP OR CONTROL WORRYING: NOT AT ALL
3. WORRYING TOO MUCH ABOUT DIFFERENT THINGS: SEVERAL DAYS
GAD7 TOTAL SCORE: 3

## 2024-12-17 ASSESSMENT — PATIENT HEALTH QUESTIONNAIRE - PHQ9
SUM OF ALL RESPONSES TO PHQ QUESTIONS 1-9: 3
SUM OF ALL RESPONSES TO PHQ QUESTIONS 1-9: 3
10. IF YOU CHECKED OFF ANY PROBLEMS, HOW DIFFICULT HAVE THESE PROBLEMS MADE IT FOR YOU TO DO YOUR WORK, TAKE CARE OF THINGS AT HOME, OR GET ALONG WITH OTHER PEOPLE: SOMEWHAT DIFFICULT

## 2024-12-17 ASSESSMENT — PAIN SCALES - PAIN ENJOYMENT GENERAL ACTIVITY SCALE (PEG)
INTERFERED_GENERAL_ACTIVITY: 5
INTERFERED_ENJOYMENT_LIFE: 5
INTERFERED_ENJOYMENT_LIFE: 5
INTERFERED_GENERAL_ACTIVITY: 5
PEG_TOTALSCORE: 5
PEG_TOTALSCORE: 5
AVG_PAIN_PASTWEEK: 5
AVG_PAIN_PASTWEEK: 5

## 2024-12-17 ASSESSMENT — PAIN SCALES - GENERAL: PAINLEVEL_OUTOF10: MILD PAIN (2)

## 2024-12-17 NOTE — PROGRESS NOTES
Assessment & Plan     Chronic bilateral low back pain without sciatica  Chronic left-sided low back pain without sciatica  Postural imbalance  Printed out pain clinic great recommendations and encouraged to follow-up on the options she hasn't tried yet or she/we think may help (acupuncture, consider pain psychology, TENS unit).  Will also refer pt back to care counseling to see if options for help at home.  She wishes she had followed-through with the CC appt last time.  - PRIMARY CARE FOLLOW-UP SCHEDULING    Major depressive disorder, recurrent, moderate (H)  Anxiety state  Mood/anxiety symptoms under fairly good control with lexapro 20mg/d.    No side effects.  Refills sent.  Continue every six month follow-up.   - PRIMARY CARE FOLLOW-UP SCHEDULING  - escitalopram (LEXAPRO) 20 MG tablet; Take 1 tablet (20 mg) by mouth daily.    Osteopenia of both hips  Reminded pt to schedule follow-up with endo- due for next reclast dose in 1/25.   - PRIMARY CARE FOLLOW-UP SCHEDULING  - Adult Endocrinology  Referral; Future    Need for vaccination against respiratory syncytial virus  Pt will try and get at pharmacy.       Follow-up Visit   Expected date:  Jun 17, 2025 (Approximate)      Follow Up Appointment Details:     Follow-up with whom?: Me    Follow-Up for what?: Medicare Wellness    Any Additional Chronic Condition Management?:  Anxiety Comment - back   pain, osteoporosis  Depression       Welcome or Annual?: Annual Wellness    How?: In Person                   I spent a total of 35 minutes on the day of the visit.   Time spent by me today doing chart review, history and exam, documentation and further activities per the note    The longitudinal plan of care for the diagnosis(es)/condition(s) as documented were addressed during this visit. Due to the added complexity in care, I will continue to support Lawrence in the subsequent management and with ongoing continuity of care.            Subjective   Lawrence is a  83 year old, presenting for the following health issues:  Musculoskeletal Problem, Depression, and Anxiety        12/17/2024     1:41 PM   Additional Questions   Roomed by Miya BARNHART   Accompanied by self     History of Present Illness       Back Pain:  She presents for follow up of back pain. Patient's back pain is a chronic problem.  Location of back pain:  Right lower back and left lower back  Description of back pain: dull ache  Back pain spreads: nowhere    Since patient first noticed back pain, pain is: always present, but gets better and worse  Does back pain interfere with her job:  Not applicable       Mental Health Follow-up:  Patient presents to follow-up on Depression & Anxiety.Patient's depression since last visit has been:  No change  The patient is not having other symptoms associated with depression.  Patient's anxiety since last visit has been:  No change  The patient is not having other symptoms associated with anxiety.  Any significant life events: No  Patient is not feeling anxious or having panic attacks.  Patient has no concerns about alcohol or drug use.    She eats 2-3 servings of fruits and vegetables daily.She consumes 0 sweetened beverage(s) daily.She exercises with enough effort to increase her heart rate 60 or more minutes per day.  She exercises with enough effort to increase her heart rate 4 days per week. She is missing 1 dose(s) of medications per week.  She is not taking prescribed medications regularly due to remembering to take.     Back pain/mobility- it's the same, chronic for 20-30 yrs.  Pain is in the low back or upper buttocks.  Sleeps okay, but stiff and painful in the morning, low back, ankles and feet.    Was cross country skiing two yrs ago- no snow last year, but hopes to get back to the lakes this year.  Wouldn't do the hills any longer.  Even on lakes, bit afraid of falling and not able to get up.  Walks with walking poles.    Has done PT several time.  Some exercises  help, especially some stretching.  Tries to keep up on them.    Went over 4/24 pain clinic consult- lots of recs, pt doesn't recall many of them, so reviewed...  --Rec PT- pt does past exercises.  --Offered SI jt injection - not done- she felt it wasn't helpful in past.  --Numbers given for acupuncture and chiro- not done.  --TENs unit- pt should follow-up with pain clinic if interested.      Osteoporosis-   Reclast infusion on 1/8/24.  Did well.    Care coordination- had an video appt set up, but was having a bad day, so didn't talk to them, regrets that now.  Home- tries to do as much as she can, but it's getting harder and harder.  Really hard to wash the floors- sits down to do them, and mops.   Really likes to have them clean.  Making the bed, with the help of her partner it's doable.  Cooking is okay for the most part, but hard time with pain standing in the kitchen, so tries to simplify things.  Does the shopping, cooking and cleaning.  Shopping is mostly okay as long as she has a cart.  She does most of the driving ( has some vision issues).        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    Osteoarthritis    Osteopenia of both hips    Basal cell carcinoma of skin of nose    Hyperlipidemia with target LDL less than 130    Degenerative scoliosis in adult patient    Pulmonary nodules    Postural imbalance    Left wrist pain    Traumatic closed displaced fracture of distal end of radius, left, with routine healing, subsequent encounter    Wrist stiffness, left    Fracture of wrist, left, closed, initial encounter    Left-sided low back pain without sciatica    Gait instability    Chronic left-sided low back pain without sciatica      Current Outpatient Medications  "  Medication Sig Dispense Refill    atorvastatin (LIPITOR) 20 MG tablet Take 1 tablet (20 mg) by mouth daily 90 tablet 3    escitalopram (LEXAPRO) 20 MG tablet Take 1 tablet (20 mg) by mouth daily. 90 tablet 1         Allergies   Allergen Reactions    No Known Drug Allergy           Review of Systems  Constitutional, neuro, ENT, endocrine, pulmonary, cardiac, gastrointestinal, genitourinary, musculoskeletal, integument and psychiatric systems are negative, except as otherwise noted.      Objective    /58 (BP Location: Right arm, Patient Position: Sitting, Cuff Size: Adult Regular)   Pulse 96   Temp 97.2  F (36.2  C) (Temporal)   Resp 16   Ht 1.613 m (5' 3.5\")   Wt 64.4 kg (141 lb 14.4 oz)   SpO2 98%   BMI 24.74 kg/m    Body mass index is 24.74 kg/m .  Physical Exam   GENERAL: alert and no distress  EYES: Eyes grossly normal to inspection, PERRL and conjunctivae and sclerae normal  NECK: no adenopathy, no asymmetry, masses, or scars  RESP: lungs clear to auscultation - no rales, rhonchi or wheezes  CV: regular rate and rhythm, normal S1 S2, no S3 or S4, no murmur, click or rub, no peripheral edema  ABDOMEN: soft, nontender, no hepatosplenomegaly, no masses and bowel sounds normal  MS: no gross musculoskeletal defects noted, no edema  PSYCH: mentation appears normal, affect normal/bright      Office Visit on 06/12/2024   Component Date Value Ref Range Status    Cholesterol 06/12/2024 183  <200 mg/dL Final    Triglycerides 06/12/2024 86  <150 mg/dL Final    Direct Measure HDL 06/12/2024 55  >=50 mg/dL Final    LDL Cholesterol Calculated 06/12/2024 111 (H)  <=100 mg/dL Final    Non HDL Cholesterol 06/12/2024 128  <130 mg/dL Final    Patient Fasting > 8hrs? 06/12/2024 No   Final    Sodium 06/12/2024 139  135 - 145 mmol/L Final    Reference intervals for this test were updated on 09/26/2023 to more accurately reflect our healthy population. There may be differences in the flagging of prior results with " similar values performed with this method. Interpretation of those prior results can be made in the context of the updated reference intervals.     Potassium 06/12/2024 4.5  3.4 - 5.3 mmol/L Final    Carbon Dioxide (CO2) 06/12/2024 26  22 - 29 mmol/L Final    Anion Gap 06/12/2024 10  7 - 15 mmol/L Final    Urea Nitrogen 06/12/2024 19.3  8.0 - 23.0 mg/dL Final    Creatinine 06/12/2024 0.99 (H)  0.51 - 0.95 mg/dL Final    GFR Estimate 06/12/2024 56 (L)  >60 mL/min/1.73m2 Final    Calcium 06/12/2024 9.3  8.8 - 10.2 mg/dL Final    Chloride 06/12/2024 103  98 - 107 mmol/L Final    Glucose 06/12/2024 84  70 - 99 mg/dL Final    Alkaline Phosphatase 06/12/2024 54  40 - 150 U/L Final    AST 06/12/2024 24  0 - 45 U/L Final    Reference intervals for this test were updated on 6/12/2023 to more accurately reflect our healthy population. There may be differences in the flagging of prior results with similar values performed with this method. Interpretation of those prior results can be made in the context of the updated reference intervals.    ALT 06/12/2024 17  0 - 50 U/L Final    Reference intervals for this test were updated on 6/12/2023 to more accurately reflect our healthy population. There may be differences in the flagging of prior results with similar values performed with this method. Interpretation of those prior results can be made in the context of the updated reference intervals.      Protein Total 06/12/2024 6.9  6.4 - 8.3 g/dL Final    Albumin 06/12/2024 4.4  3.5 - 5.2 g/dL Final    Bilirubin Total 06/12/2024 0.4  <=1.2 mg/dL Final    Patient Fasting > 8hrs? 06/12/2024 No   Final     No results found for any visits on 12/17/24.        Signed Electronically by: Roberta Delong MD    Answers submitted by the patient for this visit:  Patient Health Questionnaire (Submitted on 12/17/2024)  If you checked off any problems, how difficult have these problems made it for you to do your work, take care of things at  home, or get along with other people?: Somewhat difficult  PHQ9 TOTAL SCORE: 3

## 2024-12-17 NOTE — PATIENT INSTRUCTIONS
Reminders-    I should see you back for Wellness appointment in 6/25.    Get RSV vaccine at pharmacy.    Schedule endocrinology follow-up - reclast due in 1/25.    Look at Pain Clinic consultation recommendations....  Look into acupuncture.  Number to call to set up in print-out today.  Consider pain psychology or TENS unit- schedule with pain clinic if interested in these options.

## 2024-12-20 ENCOUNTER — PATIENT OUTREACH (OUTPATIENT)
Dept: CARE COORDINATION | Facility: CLINIC | Age: 84
End: 2024-12-20
Payer: COMMERCIAL

## 2024-12-20 NOTE — PROGRESS NOTES
Clinic Care Coordination Contact  [facility]/Voicemail      Clinical Data: Care Coordinator Outreach    Outreach Documentation Number of Outreach Attempt   11/7/2024   1:52 PM 2   12/20/2024   3:50 PM 1       Left message on patient's voicemail with call back information and requested return call.      Plan: Care Coordinator will try to reach patient again in 1-2 business days.    Sola Valentin  Community Health Worker  [facility]  [phone]

## 2024-12-23 NOTE — PROGRESS NOTES
"Clinic Care Coordination Contact  Community Health Worker Initial Outreach        Chart Review: PCP referral from Dr. Delong on 12/19/24. HX: Pt has CC enrollment encounter scheduled with PEPITO Miles RN, on 11/4/24, however, was UTCx2 11/4/24 and 11/7/24:  Patient/caregiver support - navigation of long term care/county services, home safety, resources for support      CHW Initial Information Gathering:  Referral Source: PCP  Current living arrangement:: I live in a private home with spouse  Type of residence:: Private home - stairs  Community Resources: None  Supplies Currently Used at Home: None  Equipment Currently Used at Home: cane, quad (SEC in the house; hiking poles when walking outside)  Informal Support system:: Family (Children live in [place] and [place])  No PCP office visit in Past Year: No  Transportation means:: Regular car (Spouse does not drive very much; pt drives only on side streets, not on highways)  CHW Additional Questions  If ED/Hospital discharge, follow-up appointment scheduled as recommended?: N/A  Medication changes made following ED/Hospital discharge?: N/A  MyChart active?: Yes  Patient sent Social Drivers of Health questionnaire?: Yes    Patient accepts CC: Yes. Patient scheduled for assessment with PEPITO Nix RN, on 1/7/25 at 2:00 pm. Patient noted desire to discuss in-home support for housekeeping, will [facility] help cover the cost of in-home support.       Spoke to pt this morning:  \"I should not have missed my visit with your nurse earlier [11/4/24 and 11/7/24].\"  House cleaning - long-standing back problems which make vacuuming, dusting, mopping floors difficult.Wonders if there are programs within [facility] to help with housekeeping. Indep with grocery shopping, and now is doing more online grocery shopping and then goes to . ADLs and IADLs - indep. Pt states they would likely be able to afford private pay options. Pt does not know of anyone in her personal network who would be " able to assist with this.   Transportation - pt drives on side streets. Spouse doesn't drive much at all.   Medications - affordable to pt  Housing - live in own home  Food - no instability  County benefits - none  Children live in [place] and [place]. Son from [place] is visiting with pt through the Holidays.  LTC insurance - pt states they do have a small plan      Sola Valentin  Community Health Worker  [facility]  [phone]

## 2025-02-04 ENCOUNTER — PATIENT OUTREACH (OUTPATIENT)
Dept: CARE COORDINATION | Facility: CLINIC | Age: 85
End: 2025-02-04
Payer: COMMERCIAL

## 2025-02-04 NOTE — PROGRESS NOTES
Clinic Care Coordination Contact  [facility]/Voicemail    Clinical Data: Care Coordinator Outreach    Outreach Documentation Number of Outreach Attempt   2/4/2025   3:10 PM 1       Left message on patient's voicemail with call back information and requested return call.      Plan:  Care Coordinator will try to reach patient again in 10 business days.    Sola Valentin  Community Health Worker  [facility]  [phone]

## 2025-02-27 ENCOUNTER — PATIENT OUTREACH (OUTPATIENT)
Dept: CARE COORDINATION | Facility: CLINIC | Age: 85
End: 2025-02-27
Payer: COMMERCIAL

## 2025-02-27 NOTE — PROGRESS NOTES
Clinic Care Coordination Contact  Community Health Worker Follow Up    Care Gaps: Did not disucss    Health Maintenance Due   Topic Date Due    RSV VACCINE (1 - 1-dose 75+ series) Never done    BMP  12/12/2024     Care Plan: Help At Home       Problem: House keeping services       Priority: Low      Goal: Establish housekeeping services.  Completed 2/27/2025      Start Date: 1/10/2025    This Visit's Progress: 100% Recent Progress: 10%    Priority: Low    Note:     Barriers: diagnoses of multiple, chronic,medical conditions; decreased mobility  Strengths: Motivated; agreeable to Care Coordination; Family support  Patient expressed understanding of goal: Yes  Action steps to achieve this goal:  1. I will review and research resources provided for assistance with House keeping services (Completed - received and has been looking at)  -Resources Sent via Mind[employer] and Mail on 1/10/24    [facility] is a nonprofit organization with a mission of Empowering People as They Age!    The HOME program provides help with indoor and outdoor chores like housekeeping, minor home repairs, painting projects, and lawn care/snow removal. Personal Technology Support & Coaching is also available.    Senior Outreach & Caregiver Services are available to help older adults and their families access appropriate and affordable services for their unique needs. Our licensed social workers also host Caregiver Support Groups in several locations.    Address: [facility]. [address], [place] [id number]  Tel: [phone]  Email: [email or web address]    [facility] Main Office  [address] [place] [address] [phone]  [email or web address]  [facility]'s Chore Program provides household chore and maintenance services to seniors over 60 and disabled persons of all ages living in [facility].   [employer] [facility] is a non-profit organization based in [facility]  that unites and mobilizes congregations and the community to serve and empower our neighbors. It was formed in 1970 by eight congregations seeking to make a positive impact on the changing community. Today, [facility] has 19 [employer] partners who work in tandem with [facility] leadership and staff to bring change through community based programs and initiatives. [facility] also operates with a host of committed friends and volunteers.    RyanGrannidiakathie? 1-740-244-3848?  Order rides, grocery delivery, pharmacy delivery, meals, home chores and more by calling our convenient phone number. We intercept GPS issues,  communication troubles and more to oversee trips from request to fulfillment with 100% reliability.?   Ryan - The Best Way To Call Lyft & Uber Without A Smartphone (gogograndparent.[email or web address])?     ?     2. I will contact Care Coordinator for additional resources if resources provided do not work for my lifestyle/situation.   3. I will contact my care team with questions, concerns or support needs. I will use the clinic as a resource and I understand I can contact my clinic with 24/7 after hours services available. Care Coordinator will remain available as needed.     2/27/25 - Goal updated                                Intervention and Education during outreach:   Home care resources sent by CB via [employer] on 1/10/25 - pt has received. She looked through the list but has not called out on any of the resources yet.  Pt wonders how to reach CHW if she has questions. It was determined CHW would send a [employer] message leaving my contact information.  CHW has completed all goals with patient and has reviewed no new needs from patient. Maintenance has been discussed with patient, and patient is in agreement with moving to maintenance.     CHW Plan: CHW will send a [employer] message with my contact information per pt request. CHW will route to MAI Nix, to review for maintenance and/or further direction per standard work.  Next outreach scheduled for 2 months.    Sola Valentin  Community Health Worker  [facility]  [phone]

## 2025-02-27 NOTE — LETTER
[facility] CARE COORDINATION  [address]  [facility] [address]    February 27, 2025    Lawrence Pemberton  [address]   [facility] [address]      Judy Bravo,    Thank you for taking the time to speak with me today. Below I have included my contact information.    If you have any further questions, do not hesitate to contact me.      Sola Valentin  Community Health Worker  [facility]  [phone]

## 2025-02-27 NOTE — PROGRESS NOTES
Clinic Care Coordination Contact    Situation: Patient chart reviewed by care coordinator.    Background: Patient is open to Primary Care Coordination.     Assessment: CHW Care Coordinator reached out to Patient and Patient reports to have met all goals for Care Coordination. No new needs identified at this time.     Plan/Recommendations: Will move Patient to Maintenance status. Care Coordination will remain available to assist as needed. Care Coordination will reach out to Patient in 2 months to assess for any new needs. If no new needs Patient will Graduate from the Care Coordination program.     BOLA Miles, RN, PHN   Care Coordinator-Ambulatory Care Management  [facility]  [phone]

## 2025-04-12 ENCOUNTER — HEALTH MAINTENANCE LETTER (OUTPATIENT)
Age: 85
End: 2025-04-12

## 2025-04-29 ENCOUNTER — PATIENT OUTREACH (OUTPATIENT)
Dept: CARE COORDINATION | Facility: CLINIC | Age: 85
End: 2025-04-29
Payer: COMMERCIAL

## 2025-04-29 NOTE — PROGRESS NOTES
Clinic Care Coordination Contact  [facility]/Voicemail    Clinical Data: Care Coordinator Outreach    Outreach Documentation Number of Outreach Attempt   4/29/2025   2:36 PM 1       Left message on patient's voicemail with call back information and requested return call.      Plan: Care Coordinator will try to reach patient again in 10 business days.    BECKY Coppola   [phone]  Clinic Care Coordination  [facility]

## 2025-05-10 NOTE — PROGRESS NOTES
Problem: Alteration in Thoughts and Perception  Goal: Treatment Goal: Gain control of psychotic behaviors/thinking, reduce/eliminate presenting symptoms and demonstrate improved reality functioning upon discharge  Outcome: Progressing  Goal: Verbalize thoughts and feelings  Description: Interventions:- Promote a nonjudgmental and trusting relationship with the patient through active listening and therapeutic communication- Assess patient's level of functioning, behavior and potential for risk- Engage patient in 1 on 1 interactions- Encourage patient to express fears, feelings, frustrations, and discuss symptoms  - [place] patient to reality, help patient recognize reality-based thinking - Administer medications as ordered and assess for potential side effects- Provide the patient education related to the signs and symptoms of the illness and desired effects of prescribed medications  Outcome: Progressing  Goal: Refrain from acting on delusional thinking/internal stimuli  Description: Interventions:- Monitor patient closely, per order - Utilize least restrictive measures - Set reasonable limits, give positive feedback for acceptable - Administer medications as ordered and monitor of potential side effects  Outcome: Progressing  Goal: Agree to be compliant with medication regime, as prescribed and report medication side effects  Description: Interventions:- Offer appropriate PRN medication and supervise ingestion; conduct AIMS, as needed   Outcome: Progressing  Goal: Attend and participate in unit activities, including therapeutic, recreational, and educational groups  Description: Interventions:-Encourage Visitation and family involvement in care  Outcome: Progressing  Goal: Recognize dysfunctional thoughts, communicate reality-based thoughts at the time of discharge  Description: Interventions:- Provide medication and psycho-education to assist patient in compliance and developing insight into his/her illness  SOAP Note Objective Information for 11/10/2022    Diagnosis: Traumatic closed displaced fracture of distal end of left radius   DOI: 9/29/22 (script date); referred by Elneo Victoria; injured on 8/22/22 after falling backwards  Procedure:  Cast from 8/23/22 to 10/25/22  Post:  11w 3d     Per MD visit on 9/23/22, patient to begin OT to address wrist stiffness; discussed given ulnar positive variance and impacted nature, residual stiffness or loss of ROM may persist long-term; left zipper splint after casting      Per MD visit on 10/25/22,   -Cast removal today, now 8 weeks from injury  -OT to start today; zipper splint and may introduce ROM exercises and gradual strengthening as tolerated  -Follow up in 4 weeks    Pain Level (Scale 0-10)   10/25/2022 11/10/22   At Rest 0/10 0/10   With Use 3/10  4/10     Pain Description  Date 10/25/2022   Location Left wrist   Pain Quality Aching and Dull   Frequency intermittent or constant     Pain is worst  daytime or nighttime   Exacerbated by  with use and movement    Relieved by none   Progression Unchanged      ROM  Pain Report:  -none + mild    ++ moderate    +++ severe     Wrist 10/25/2022 10/25/2022 11/10/22   AROM (PROM) R L L   Extension 52 48 55 pre  65 post   Flexion 40 12 18 pre  26 post   RD 15 10 20 pre     UD 28 40 30 pre     Supination 78 68 75 pre     Pronation WFL WFL WNL     Thumb 10/25/2022 10/25/2022   AROM (PROM) R L   MP /60 /50   IP /45 /45   RABD 50 45   PABD 60 48   Kapandji Opposition Scale (0-10/10) 9 8     Home Program:   Orthosis Wear and Care  Wrist Active Range of Motion Extension and Flexion Combined  Active Range of Motion Combined Radial and Ulnar Deviation  Forearm Active Range of Motion Combined Pronation and Supination  Finger Active Range of Motion Tendon Glides Fist Series  Thumb Active Range of Motion Opposition  11/10/22  Epsom salt soaks  Compression sleeve for edema  PROM wrist - flexion and extension    Next    Outcome: Progressing  Goal: Complete daily ADLs, including personal hygiene independently, as able  Description: Interventions:- Observe, teach, and assist patient with ADLS- Monitor and promote a balance of rest/activity, with adequate nutrition and elimination   Outcome: Progressing     Problem: Ineffective Coping  Goal: Demonstrates healthy coping skills  Outcome: Progressing  Goal: Participates in unit activities  Description: Interventions:- Provide therapeutic environment - Provide required programming - Redirect inappropriate behaviors   Outcome: Progressing  Goal: Patient/Family participate in treatment and DC plans  Description: Interventions:- Provide therapeutic environment  Outcome: Progressing  Goal: Patient/Family verbalizes awareness of resources  Outcome: Progressing  Goal: Understands least restrictive measures  Description: Interventions:- Utilize least restrictive behavior  Outcome: Progressing  Goal: Free from restraint events  Description: - Utilize least restrictive measures - Provide behavioral interventions - Redirect inappropriate behaviors   Outcome: Progressing     Problem: Risk for Self Injury/Neglect  Goal: Treatment Goal: Remain safe during length of stay, learn and adopt new coping skills, and be free of self-injurious ideation, impulses and acts at the time of discharge  Outcome: Progressing  Goal: Verbalize thoughts and feelings  Description: Interventions:- Assess and re-assess patient's lethality and potential for self-injury- Engage patient in 1:1 interactions, daily, for a minimum of 15 minutes- Encourage patient to express feelings, fears, frustrations, hopes- Establish rapport/trust with patient   Outcome: Progressing  Goal: Refrain from harming self  Description: Interventions:- Monitor patient closely, per order- Develop a trusting relationship- Supervise medication ingestion, monitor effects and side effects   Outcome: Progressing  Goal: Attend and participate in unit  Visit:  [place]affin  A/PROM  Check strength, add gentle strengthening for HEP  STM     Please refer to the daily flowsheet for treatment today, total treatment time and time spent performing 1:1 timed codes.            activities, including therapeutic, recreational, and educational groups  Description: Interventions:- Provide therapeutic and educational activities daily, encourage attendance and participation, and document same in the medical record- Obtain collateral information, encourage visitation and family involvement in care   Outcome: Progressing  Goal: Recognize maladaptive responses and adopt new coping mechanisms  Outcome: Progressing  Goal: Complete daily ADLs, including personal hygiene independently, as able  Description: Interventions:- Observe, teach, and assist patient with ADLS- Monitor and promote a balance of rest/activity, with adequate nutrition and elimination  Outcome: Progressing     Problem: Depression  Goal: Treatment Goal: Demonstrate behavioral control of depressive symptoms, verbalize feelings of improved mood/affect, and adopt new coping skills prior to discharge  Outcome: Progressing  Goal: Verbalize thoughts and feelings  Description: Interventions:- Assess and re-assess patient's level of risk - Engage patient in 1:1 interactions, daily, for a minimum of 15 minutes - Encourage patient to express feelings, fears, frustrations, hopes   Outcome: Progressing  Goal: Refrain from harming self  Description: Interventions:- Monitor patient closely, per order - Supervise medication ingestion, monitor effects and side effects   Outcome: Progressing  Goal: Refrain from isolation  Description: Interventions:- Develop a trusting relationship - Encourage socialization   Outcome: Progressing  Goal: Refrain from self-neglect  Outcome: Progressing  Goal: Attend and participate in unit activities, including therapeutic, recreational, and educational groups  Description: Interventions:- Provide therapeutic and educational activities daily, encourage attendance and participation, and document same in the medical record   Outcome: Progressing  Goal: Complete daily ADLs, including personal hygiene independently, as  able  Description: Interventions:- Observe, teach, and assist patient with ADLS-  Monitor and promote a balance of rest/activity, with adequate nutrition and elimination   Outcome: Progressing     Problem: Anxiety  Goal: Anxiety is at manageable level  Description: Interventions:- Assess and monitor patient's anxiety level. - Monitor for signs and symptoms (heart palpitations, chest pain, shortness of breath, headaches, nausea, feeling jumpy, restlessness, irritable, apprehensive). - Collaborate with interdisciplinary team and initiate plan and interventions as ordered.- [place] patient to unit/surroundings- Explain treatment plan- Encourage participation in care- Encourage verbalization of concerns/fears- Identify coping mechanisms- Assist in developing anxiety-reducing skills- Administer/offer alternative therapies- Limit or eliminate stimulants  Outcome: Progressing     Problem: Alteration in Orientation  Goal: Treatment Goal: Demonstrate a reduction of confusion and improved orientation to person, place, time and/or situation upon discharge, according to optimum baseline/ability  Outcome: Progressing  Goal: Interact with staff daily  Description: Interventions:- Assess and re-assess patient's level of orientation- Engage patient in 1 on 1 interactions, daily, for a minimum of 15 minutes - Establish rapport/trust with patient   Outcome: Progressing  Goal: Express concerns related to confused thinking related to:  Description: Interventions:- Encourage patient to express feelings, fears, frustrations, hopes- Assign consistent caregivers - [place]/re-orient patient as needed- Allow comfort items, as appropriate- Provide visual cues, signs, etc.   Outcome: Progressing  Goal: Allow medical examinations, as recommended  Description: Interventions:- Provide physical/neurological exams and/or referrals, per provider   Outcome: Progressing  Goal: Cooperate with recommended testing/procedures  Description: Interventions:-  Determine need for ancillary testing- Observe for mental status changes- Implement falls/precaution protocol   Outcome: Progressing  Goal: Attend and participate in unit activities, including therapeutic, recreational, and educational groups  Description: Interventions:- Provide therapeutic and educational activities daily, encourage attendance and participation, and document same in the medical record - Provide appropriate opportunities for reminiscence - Provide a consistent daily routine - Encourage family contact/visitation   Outcome: Progressing  Goal: Complete daily ADLs, including personal hygiene independently, as able  Description: Interventions:- Observe, teach, and assist patient with ADLS  Outcome: Progressing     Problem: DISCHARGE PLANNING - CARE MANAGEMENT  Goal: Discharge to post-acute care or home with appropriate resources  Description: INTERVENTIONS:- Conduct assessment to determine patient/family and health care team treatment goals, and need for post-acute services based on payer coverage, community resources, and patient preferences, and barriers to discharge- Address psychosocial, clinical, and financial barriers to discharge as identified in assessment in conjunction with the patient/family and health care team- Arrange appropriate level of post-acute services according to patient’s   needs and preference and payer coverage in collaboration with the physician and health care team- Communicate with and update the patient/family, physician, and health care team regarding progress on the discharge plan- Arrange appropriate transportation to post-acute venues  Outcome: Progressing

## 2025-05-20 ENCOUNTER — PATIENT OUTREACH (OUTPATIENT)
Dept: CARE COORDINATION | Facility: CLINIC | Age: 85
End: 2025-05-20
Payer: COMMERCIAL

## 2025-05-20 NOTE — PROGRESS NOTES
Clinic Care Coordination Contact    Assessment: Care Coordinator contacted patient for 2 month follow up.  Patient has continued to follow the plan of care and assessment is negative for any new needs or concerns.    Enrollment status: Graduated.      Plan: No further outreaches at this time.  Patient will continue to follow the plan of care.  If new needs arise a new Care Coordination referral may be placed.  FYI to PCP    CHARLIE MilesN, RN, PHN   Care Coordinator-Ambulatory Care Management  [facility]  [phone]

## 2025-05-20 NOTE — Clinical Note
[facility]  Patient Centered Plan of Care  About Me:        Patient Name:  Lawrence Pemberton    YOB: 1940  Age:         84 year old   [place] MRN:    [id number] Telephone Information:  Home Phone [phone]   Mobile [phone]       Address:  [address] [place] [address] Email address:  [email or web address]      Emergency Contact(s)  Name Relationship Lgl Grd Work Phone Home Phone Mobile Phone   JOSSIE SEVILLA Spouse  [phone]            Primary language:  English     needed? No   [facility]:  [phone] op. 1  Other communication barriers:Hearing impairment    Preferred Method of Communication:  Mail  Current living arrangement: I live in a private home with spouse    Mobility Status/ Medical Equipment: Independent        Health Maintenance  Health Maintenance Reviewed: Up to date      My Access Plan  Medical Emergency 911   Primary [facility] Line [facility] [phone]   24 Hour Appointment Line [phone] or  [phone]) (toll-free)   24 Hour Nurse Line [phone] (toll-free)   Preferred Urgent Care [facility], [phone]     [facility]  [phone]     Preferred Pharmacy [id number] - [address]     Behavioral Health Crisis Line The National Suicide Prevention Lifeline at 1-590.475.5211 or Text/Call 468           My Care Team Members  Patient Care Team       Relationship Specialty Notifications Start End    Roberta Delong MD PCP - General   11/16/07     Phone: [phone] Fax: [phone] [address] [place] [address] [facility] [address]    Roberta Delong MD Assigned PCP   11/24/19     Phone: [phone] Fax: [phone] [address] [place] [address] [facility] [address]    May Calhoun MD MD Endocrinology, Diabetes, and  Metabolism  6/23/23     Phone: [phone] Fax: [phone]         [address] [facility] [address] [facility] [address]    Laura Schwab APRN CNP Nurse Practitioner Pain Medicine  3/7/24     Phone: [phone] Pager: [phone] Fax: [phone] [address]  [facility] [address]    Dinah Chappell, RN Lead Care Coordinator  Admissions 12/23/24     Sola Valentin CHW Community Health Worker  Admissions 1/10/25     Phone: [phone]                   My Care Plans  Self Management and Treatment Plan    Care Plan      Action Plans on File:                       Advance Care Plans/Directives:   Advanced Care Plan/Directives on file: Yes    Status of Document(s): On File and Validated    Advanced Care Plan/Directives Type: No data recorded           My Medical and Care Information  Problem List   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    Osteoarthritis    Osteopenia of both hips    Basal cell carcinoma of skin of nose    Hyperlipidemia with target LDL less than 130    Degenerative scoliosis in adult patient    Pulmonary nodules    Postural imbalance    Left wrist pain    Traumatic closed displaced fracture of distal end of radius, left, with routine healing, subsequent encounter    Wrist stiffness, left    Fracture of wrist, left, closed, initial encounter    Left-sided low back pain without sciatica    Gait instability    Chronic left-sided low back pain without sciatica      Current Medications:  {[email or web address]}    Care Coordination Start Date: 12/19/2024   Frequency of Care Coordination: monthly, more frequently as needed (maintenance)     Form Last Updated: 05/20/2025

## 2025-05-20 NOTE — PROGRESS NOTES
Clinic Care Coordination Contact  Community Health Worker Follow Up    Care Gaps:     Health Maintenance Due   Topic Date Due    RSV VACCINE (1 - 1-dose 75+ series) Never done    BMP  12/12/2024    COVID-19 Vaccine (10 - 2024-25 season) 04/16/2025    MEDICARE ANNUAL WELLNESS VISIT  06/12/2025    LIPID  06/12/2025    PHQ-9  06/17/2025       Pt is aware of upcoming medicare annual wellness visit on 6/17/25    Care Plan:   Care Plan: Help At Home Completed 2/27/2025      Problem: House keeping services  Resolved 2/27/2025      Priority: Low      Goal: Establish housekeeping services.  Completed 2/27/2025      Start Date: 1/10/2025    This Visit's Progress: 100% Recent Progress: 10%    Priority: Low    Note:     Barriers: diagnoses of multiple, chronic,medical conditions; decreased mobility  Strengths: Motivated; agreeable to Care Coordination; Family support  Patient expressed understanding of goal: Yes  Action steps to achieve this goal:  1. I will review and research resources provided for assistance with House keeping services (Completed - received and has been looking at)  -Resources Sent via [employer] and Mail on 1/10/24    [facility] is a nonprofit organization with a mission of Empowering People as They Age!    The HOME program provides help with indoor and outdoor chores like housekeeping, minor home repairs, painting projects, and lawn care/snow removal. Personal Technology Support & Coaching is also available.    Senior Outreach & Caregiver Services are available to help older adults and their families access appropriate and affordable services for their unique needs. Our licensed social workers also host Caregiver Support Groups in several locations.    Address: [facility] [address] [facility]. [address], [place] [id number]  Tel: [phone]  Email: [email or web address]    [facility] Main Office  [address] [place] [address] [phone]  [phone]  [email or web address]  [facility]'s Chore Program provides household chore and maintenance services to seniors over 60 and disabled persons of all ages living in [facility].   [employer] is a non-profit organization based in [facility] that unites and mobilizes congregations and the community to serve and empower our neighbors. It was formed in 1970 by eight congregations seeking to make a positive impact on the changing community. Today, [employer] has 19 Mos[employer] partners who work in tandem with [facility] leadership and staff to bring change through community based programs and initiatives. [facility] also operates with a host of committed friends and volunteers.    [place]? [phone]?  Order rides, grocery delivery, pharmacy delivery, meals, home chores and more by calling our convenient phone number. We intercept GPS issues,  communication troubles and more to oversee trips from request to fulfillment with 100% reliability.?   Ryan - The Best Way To Call Lyft & Uber Without A Smartphone ([email or web address])?     ?     2. I will contact Care Coordinator for additional resources if resources provided do not work for my lifestyle/situation.   3. I will contact my care team with questions, concerns or support needs. I will use the clinic as a resource and I understand I can contact my clinic with 24/7 after hours services available. Care Coordinator will remain available as needed.     2/27/25 - Goal updated                                Intervention and Education during outreach:   Spoke to pt this morning:  Home Care resources - pt has not called out on the resources sent 1/2025 by PEPITO Nix RN. \"I have been thinking a little bit about that.\" Pt states she has trouble getting into her [employer] account. Requests CHW resend the resources via her email address: [email or web address]  Patient has completed Maintenance phase and has no other ongoing needs from CC. Graduation has " been discussed with the understanding that patient can contact CC at anytime.     Home Care Resources resent this date via email:  [facility] Community Services     [facility] is a nonprofit organization with a mission of Empowering People as They Age!     The HOME program provides help with indoor and outdoor chores like housekeeping, minor home repairs, painting projects, and lawn care/snow removal. Personal Technology Support & Coaching is also available.     Senior Outreach & Caregiver Services are available to help older adults and their families access appropriate and affordable services for their unique needs. Our licensed social workers also host Caregiver Support Groups in several locations.     Address: [address], [place] [address]  Tel: [phone]  Email: [email or web address]           [facility] Main Office  [address] [place] [phone]  [email or web address]  [facility]'s Chore Program provides household chore and maintenance services to seniors over 60 and disabled persons of all ages living in [facility].   [employer] [facility] is a non-profit organization based in [facility] that unites and mobilizes congregations and the community to serve and empower our neighbors. It was formed in 1970 by eight congregations seeking to make a positive impact on the changing community. Today, [facility] has 19 Confucianism partners who work in tandem with [facility] leadership and staff to bring change through community based programs and initiatives. [facility] also operates with a host of committed friends and volunteers.     RyanGrandparent? [phone]?  Order rides, grocery delivery, pharmacy delivery, meals, home chores and more by calling our convenient phone number. We intercept GPS issues,  communication troubles and more to oversee trips from request to fulfillment with 100% reliability.?   Ryan - The Best Way To Call Lyft &  Uber Without A Smartphone ([email or web address])?        CHW Plan: CHW will resend home care resources to pt via email. CHW will route to MAI Nix,  to review for graduation. No outreaches scheduled at this time.      Sola Valentin  Community Health Worker  [facility]  [phone]

## 2025-05-20 NOTE — LETTER
[facility] COORDIN[facility]  [address]  [facility] [address]    May 20, 2025    Lawrence Pemberton  [address]   [facility] [address]    Dear Lawrence,  Your Care Team congratulates you on your journey to maintain wellness. This document will help guide you on your journey to maintain a healthy lifestyle.  You can use this to help you overcome any barriers you may encounter.  If you should have any questions or concerns, you can contact the members of your Care Team or contact your Primary Care Clinic for assistance.     Patient feedback is important to us and helps us continue to improve the way we care for patients as well as celebrate the things we do well. You may receive a short survey from [facility] [employer] on behalf of the care coordination team. We would appreciate hearing from you!    Health Maintenance  Health Maintenance Reviewed:      My Access Plan  Medical Emergency 911   Primary Clinic Line [facility] [phone]   24 Hour Appointment Line [phone] or  [address] [place] [address] [phone]) (toll-free)   24 Hour Nurse Line [phone] (toll-free)   Preferred Urgent Care     Preferred Hospital     Preferred Pharmacy [id number] - [place], [place] - [address] Behavioral Health Crisis Line The National Suicide Prevention Lifeline at 1-463.721.9993 or 911     My Care Team Members  Patient Care Team         Relationship Specialty Notifications Start End    Roberta Delong MD PCP - General   11/16/07     Phone: [phone] Fax: [phone]         [address] [facility] [address]    Roberta Delong MD Assigned PCP   11/24/19     Phone: [phone] Fax: [phone]         [address] [facility] [address]    May Calhoun MD MD Endocrinology, Diabetes, and Metabolism  6/23/23     Phone: [phone] Fax: [phone]         [address] [place] [address] [address] [facility] [address]     Laura Schwab APRN CNP Nurse Practitioner Pain Medicine  3/7/24     Phone: [phone] Pager: [phone] Fax: [phone] [address]  [place] [address]    Dinah Chappell, RN Lead Care Coordinator  Admissions 12/23/24     Sola Valentin CHW Community Health Worker  Admissions 1/10/25     Phone: [phone]                   Advance Care Plans/Directives Type:      Thank you for providing us with your Advance Directive. We will keep this on file and recommend that it be updated every 2 years, if your health situation changes, if there is a death of one of your health care agents, and/or if there are changes in your marital status.    It has been your Clinic Care Team's pleasure to work with you on accomplishing your goals.    Regards,  Your Clinic Care Team

## 2025-06-17 ENCOUNTER — OFFICE VISIT (OUTPATIENT)
Dept: FAMILY MEDICINE | Facility: CLINIC | Age: 85
End: 2025-06-17
Payer: COMMERCIAL

## 2025-06-17 VITALS
HEART RATE: 80 BPM | TEMPERATURE: 97 F | DIASTOLIC BLOOD PRESSURE: 63 MMHG | HEIGHT: 64 IN | BODY MASS INDEX: 24.38 KG/M2 | WEIGHT: 142.8 LBS | RESPIRATION RATE: 18 BRPM | SYSTOLIC BLOOD PRESSURE: 99 MMHG | OXYGEN SATURATION: 98 %

## 2025-06-17 DIAGNOSIS — E78.5 HYPERLIPIDEMIA WITH TARGET LDL LESS THAN 130: ICD-10-CM

## 2025-06-17 DIAGNOSIS — R26.81 GAIT INSTABILITY: ICD-10-CM

## 2025-06-17 DIAGNOSIS — M85.851 OSTEOPENIA OF BOTH HIPS: ICD-10-CM

## 2025-06-17 DIAGNOSIS — F41.1 ANXIETY STATE: ICD-10-CM

## 2025-06-17 DIAGNOSIS — F33.1 MAJOR DEPRESSIVE DISORDER, RECURRENT, MODERATE (H): ICD-10-CM

## 2025-06-17 DIAGNOSIS — M85.852 OSTEOPENIA OF BOTH HIPS: ICD-10-CM

## 2025-06-17 DIAGNOSIS — Z13.6 SCREENING FOR CARDIOVASCULAR CONDITION: ICD-10-CM

## 2025-06-17 DIAGNOSIS — Z00.00 ENCOUNTER FOR MEDICARE ANNUAL WELLNESS EXAM: Primary | ICD-10-CM

## 2025-06-17 PROCEDURE — G0439 PPPS, SUBSEQ VISIT: HCPCS | Performed by: FAMILY MEDICINE

## 2025-06-17 PROCEDURE — 1125F AMNT PAIN NOTED PAIN PRSNT: CPT | Performed by: FAMILY MEDICINE

## 2025-06-17 PROCEDURE — 3078F DIAST BP <80 MM HG: CPT | Performed by: FAMILY MEDICINE

## 2025-06-17 PROCEDURE — 99214 OFFICE O/P EST MOD 30 MIN: CPT | Mod: 25 | Performed by: FAMILY MEDICINE

## 2025-06-17 PROCEDURE — G2211 COMPLEX E/M VISIT ADD ON: HCPCS | Performed by: FAMILY MEDICINE

## 2025-06-17 PROCEDURE — 3074F SYST BP LT 130 MM HG: CPT | Performed by: FAMILY MEDICINE

## 2025-06-17 RX ORDER — ESCITALOPRAM OXALATE 20 MG/1
20 TABLET ORAL DAILY
Qty: 90 TABLET | Refills: 1 | Status: SHIPPED | OUTPATIENT
Start: 2025-06-17

## 2025-06-17 SDOH — HEALTH STABILITY: PHYSICAL HEALTH: ON AVERAGE, HOW MANY DAYS PER WEEK DO YOU ENGAGE IN MODERATE TO STRENUOUS EXERCISE (LIKE A BRISK WALK)?: 6 DAYS

## 2025-06-17 ASSESSMENT — PATIENT HEALTH QUESTIONNAIRE - PHQ9
SUM OF ALL RESPONSES TO PHQ QUESTIONS 1-9: 7
SUM OF ALL RESPONSES TO PHQ QUESTIONS 1-9: 7
10. IF YOU CHECKED OFF ANY PROBLEMS, HOW DIFFICULT HAVE THESE PROBLEMS MADE IT FOR YOU TO DO YOUR WORK, TAKE CARE OF THINGS AT HOME, OR GET ALONG WITH OTHER PEOPLE: SOMEWHAT DIFFICULT

## 2025-06-17 ASSESSMENT — ANXIETY QUESTIONNAIRES
5. BEING SO RESTLESS THAT IT IS HARD TO SIT STILL: NOT AT ALL
GAD7 TOTAL SCORE: 3
7. FEELING AFRAID AS IF SOMETHING AWFUL MIGHT HAPPEN: NOT AT ALL
8. IF YOU CHECKED OFF ANY PROBLEMS, HOW DIFFICULT HAVE THESE MADE IT FOR YOU TO DO YOUR WORK, TAKE CARE OF THINGS AT HOME, OR GET ALONG WITH OTHER PEOPLE?: NOT DIFFICULT AT ALL
2. NOT BEING ABLE TO STOP OR CONTROL WORRYING: NOT AT ALL
1. FEELING NERVOUS, ANXIOUS, OR ON EDGE: SEVERAL DAYS
3. WORRYING TOO MUCH ABOUT DIFFERENT THINGS: SEVERAL DAYS
GAD7 TOTAL SCORE: 3
6. BECOMING EASILY ANNOYED OR IRRITABLE: SEVERAL DAYS
GAD7 TOTAL SCORE: 3
7. FEELING AFRAID AS IF SOMETHING AWFUL MIGHT HAPPEN: NOT AT ALL
IF YOU CHECKED OFF ANY PROBLEMS ON THIS QUESTIONNAIRE, HOW DIFFICULT HAVE THESE PROBLEMS MADE IT FOR YOU TO DO YOUR WORK, TAKE CARE OF THINGS AT HOME, OR GET ALONG WITH OTHER PEOPLE: NOT DIFFICULT AT ALL
4. TROUBLE RELAXING: NOT AT ALL

## 2025-06-17 ASSESSMENT — PAIN SCALES - GENERAL: PAINLEVEL_OUTOF10: MODERATE PAIN (5)

## 2025-06-17 ASSESSMENT — SOCIAL DETERMINANTS OF HEALTH (SDOH): HOW OFTEN DO YOU GET TOGETHER WITH FRIENDS OR RELATIVES?: ONCE A WEEK

## 2025-06-17 NOTE — PROGRESS NOTES
Preventive Care Visit  [facility]  Roberta Delong MD, Family Medicine  Jun 17, 2025      Assessment & Plan     Encounter for Medicare annual wellness exam  H-RN Wellness Visit Notes: MD updates  -Mammogram: Last done 5/2023 (impression: normal/benign). Discontinued/Aged Out  -DEXA: Last done 8/2023 (impression: osteopenia). Due 8/2026. -- see notes below  -PAP: Discontinued/Aged Out   -Colon Cancer Screening: Discontinued/Aged Out  -Dermatology: Pt verbalized they do meet with dermatology regularly.   -Immunizations: Patient is due for the following vaccines: RSV. Advised patient to receive at a pharmacy due to Medicare insurance coverage.     Gait instability  Pt went to PT again, but states she only went once because she gave her the same exercises.  Does feel like her gait/stability is worse.  Doing the exercises about once a week- will try and do them more.    Still in their own home, doing all the work. They have been talking to care coordination to see about getting a little help.    Anxiety state  Major depressive disorder, recurrent, moderate (H)  Mood/anxiety symptoms under good control with escitalopram 20mg/d.  No side effects.  Refills sent.  Continue every six month follow-up.   - escitalopram (LEXAPRO) 20 MG tablet; Take 1 tablet (20 mg) by mouth daily.    Hyperlipidemia with target LDL less than 130  Screening for cardiovascular condition  Due for fasting lipids/CMP- will try and come in the next 1-2 weeks to check labs.  - Lipid panel reflex to direct LDL Non-fasting; Future  - Comprehensive metabolic panel (BMP + Alb, Alk Phos, ALT, AST, Total. Bili, TP); Future    Osteopenia of both hips  Pt saw endo in '23, got labs, dxa recheck.   Reclast first infusion in 1/24, but then no further follow-up.  Referred back to endo in 12/24 to help follow-up, but it looks like they tried to get pt back to clinic, but nothing was scheduled.  Printed out referral for pt today, and she  will try and call tomorrow to get scheduled for either reclast or follow-up appt if that is what is needed.        Patient has been advised of split billing requirements and indicates understanding: Yes      Counseling  Appropriate preventive services were addressed with this patient via screening, questionnaire, or discussion as appropriate for fall prevention, nutrition, physical activity, Tobacco-use cessation, social engagement, weight loss and cognition.  Checklist reviewing preventive services available has been given to the patient.  Reviewed patient's diet, addressing concerns and/or questions.   Addressed any concerns about safety while driving.  The patient was provided with written information regarding signs of hearing loss.   Information on urinary incontinence and treatment options given to patient.   The patient's PHQ-9 score is consistent with mild depression. She was provided with information regarding depression.       Follow-up    Follow-up Visit   Expected date:  Dec 17, 2025 (Approximate)      Follow Up Appointment Details:     Follow-up with whom?: Me    Follow-Up for what?: Chronic Disease f/u    Chronic Disease f/u:  Anxiety  Depression       How?: In Person             Follow-up Visit   Expected date:  Jun 24, 2026 (Approximate)      Follow Up Appointment Details:     Follow-up with whom?: PCP    Follow-Up for what?: Medicare Wellness    Welcome or Annual?: Annual Wellness    How?: In Person                 Gema Bravo is a 84 year old, presenting for the following:  Physical (AWV)        6/17/2025     1:11 PM   Additional Questions   Roomed by Marcelina CHAKRABORTY          HPI    Wellness Visit Notes:     -Mammogram: Last done 5/2023 (impression: normal/benign). Discontinued/Aged Out  -DEXA: Last done 8/2023 (impression: osteopenia). Due 8/2026.   -PAP: Discontinued/Aged Out   -Colon Cancer Screening: Discontinued/Aged Out  -Dermatology: Pt verbalized they do meet with dermatology regularly.    -Immunizations: Patient is due for the following vaccines: RSV. Advised patient to receive at a pharmacy due to Medicare insurance coverage.   -------------------------------------------------    Osteopenia-   Seen by Dr. Calhoun, in 7/23.  Labs completed.  Recheck dxa in 8/23.  Reclast infusions- first in 1/24  No follow-up after that time, despite 12/24 referral.  Printed out referral and # to call, and ask to schedule reclast.      MDD/anxiety-   Taking lexapro 20mg/d  Some ups and downs, mostly stable.  PHQ9 is 7 today- usually 0-3.  More things going on today.    Balance-   Saw PT again- just once.  Got the same exercises.  Trying to do the exercises at home.  Doing them ~1x/wk.        Needs to write down things to remember.  She does all the bills.  Víctor does some of his business things.  Living in their own home.  Not getting help yet- did talk with care coordinator.  Has numbers to call- to help with major cleaning.  He does more of the shoveling.        Six Item Cognitive Impairment Test   (6CIT):    What year is it?                               Correct - 0 points  What month is it?                               Correct - 0 points    Give the patient an address to remember with five components:   [place]st ( first and last name - 2 components)   [address]  (number and name of street - 2 components)   [place] ( city - 1 component)    About what time is it (within the hour)? Correct - 0 points  Count backwards from 20 to 1:   Correct - 0 points  Say the months of the year in reverse: Correct - 0 points  Repeat the address phrase:   Correct - 0 points    Total 6CIT Score:      28/28    Interpretation: The 6CIT uses an inverse score and questions are weighted to produce a total out of 28. Scores of 0-7 are considered normal and 8 or more significant.    Advantages The test has high sensitivity without compromising specificity even in mild dementia. It is easy to translate linguistically and  culturally.  Disadvantages The main disadvantage is in the scoring and weighting of the test, which is initially confusing, however computer models have simplified this greatly.    Probability Statistics: At the 7/8 cut off: Overall figures sensitivity 90% specificity 100%, in mild dementia sensitivity = 78% , specificity = 100%    Copyright 2000 [facility], [facility]. Courtesy of Dr. Jaswant Dawn         Advance Care Planning    Document on file is a Health Care Directive or POLST.        6/17/2025   General Health   How would you rate your overall physical health? (!) FAIR   Feel stress (tense, anxious, or unable to sleep) Only a little   (!) STRESS CONCERN      6/17/2025   Nutrition   Diet: Regular (no restrictions)         6/17/2025   Exercise   Days per week of moderate/strenous exercise 6 days         6/17/2025   Social Factors   Frequency of gathering with friends or relatives Once a week   Worry food won't last until get money to buy more No   Food not last or not have enough money for food? No   Do you have housing? (Housing is defined as stable permanent housing and does not include staying outside in a car, in a tent, in an abandoned building, in an overnight shelter, or couch-surfing.) Yes   Are you worried about losing your housing? No   Lack of transportation? No   Unable to get utilities (heat,electricity)? No         6/17/2025   Fall Risk   Fallen 2 or more times in the past year? Yes   Trouble with walking or balance? Yes   Reason Gait Speed Test Not Completed Patient declines          6/17/2025   Activities of Daily Living- Home Safety   Needs help with the following daily activites None of the above   Safety concerns in the home None of the above         6/17/2025   Dental   Dentist two times every year? Yes         6/17/2025   Hearing Screening   Hearing concerns? (!) I NEED TO ASK PEOPLE TO SPEAK UP OR REPEAT THEMSELVES.    (!) IT'S HARD TO FOLLOW A CONVERSATION IN A NOISY  RESTAURANT OR CROWDED ROOM.    (!) TROUBLE UNDESTANDING A SPEAKER IN A PUBLIC MEETING OR [employer] SERVICE.    (!) TROUBLE FOLLOWING DIALOGUE IN THE THEATHER.    (!) TROUBLE UNDERSTANDING SOFT OR WHISPERED SPEECH.       Multiple values from one day are sorted in reverse-chronological order         6/17/2025   Driving Risk Screening   Patient/family members have concerns about driving (!) YES          6/17/2025   General Alertness/Fatigue Screening   Have you been more tired than usual lately? No         6/17/2025   Urinary Incontinence Screening   Bothered by leaking urine in past 6 months Yes       Today's PHQ-9 Score:       6/17/2025    12:53 PM   PHQ-9 SCORE   PHQ-9 Total Score MyChart 7 (Mild depression)   PHQ-9 Total Score 7        Patient-reported         6/17/2025   Substance Use   Alcohol more than 3/day or more than 7/wk No   Do you have a current opioid prescription? No   How severe/bad is pain from 1 to 10? 6/10   Do you use any other substances recreationally? No     Social History     Tobacco Use    Smoking status: Never     Passive exposure: Never    Smokeless tobacco: Never   Vaping Use    Vaping status: Never Used   Substance Use Topics    Alcohol use: Never     Comment: on occ, glass wine/wk    Drug use: Never           5/11/2023   LAST FHS-7 RESULTS   1st degree relative breast or ovarian cancer No   Any relative bilateral breast cancer No   Any male have breast cancer No   Any ONE woman have BOTH breast AND ovarian cancer No   Any woman with breast cancer before 50yrs No   2 or more relatives with breast AND/OR ovarian cancer No   2 or more relatives with breast AND/OR bowel cancer No        Mammogram Screening - After age 74- determine frequency with patient based on health status, life expectancy and patient goals          Reviewed and updated as needed this visit by Provider                    Lab work is in process  Labs reviewed in EPIC  BP Readings from Last 3 Encounters:   06/17/25 99/63    12/17/24 100/58   06/12/24 112/69    Wt Readings from Last 3 Encounters:   06/17/25 64.8 kg (142 lb 12.8 oz)   12/17/24 64.4 kg (141 lb 14.4 oz)   06/12/24 65.4 kg (144 lb 3.2 oz)                  Recent Labs   Lab Test 06/12/24  1100 12/14/23  1437 06/13/23  0906 05/24/22  0850 05/24/22  0850 12/18/20  0907 06/05/20  1433 11/16/18  0955 11/14/17  0823   *  --  109*  --  90 95  --    < > 95   HDL 55  --  58  --  57 45*  --    < > 51   TRIG 86  --  69  --  78 74  --    < > 87   ALT 17  --  18  --   --   --   --   --  29   CR 0.99* 0.94 0.85   < > 0.83 0.83  --    < > 0.87   GFRESTIMATED 56* 60* 68   < > 70 66 60*   < > 63   GFRESTBLACK  --   --   --   --   --  77 73   < > 76   POTASSIUM 4.5  --  4.4  --  4.1 4.2  --    < > 4.9    < > = values in this interval not displayed.      Current providers sharing in care for this patient include:  Patient Care Team:  Roberta Delong MD as PCP - General  Roberta Delong MD as Assigned PCP  May Calhoun MD as MD (Endocrinology, Diabetes, and Metabolism)  Laura Schwab APRN CNP as Nurse Practitioner (Pain Medicine)    The following health maintenance items are reviewed in Epic and correct as of today:  Health Maintenance   Topic Date Due    ANNUAL REVIEW OF HM ORDERS  Never done    RSV VACCINE (1 - 1-dose 75+ series) Never done    BMP  12/12/2024    LIPID  06/12/2025    MEDICARE ANNUAL WELLNESS VISIT  06/12/2025    SHARMAINE ASSESSMENT  12/17/2025    PHQ-9  12/17/2025    FALL RISK ASSESSMENT  06/17/2026    DEXA  08/25/2026    DTAP/TDAP/TD VACCINE (2 - Td or Tdap) 11/14/2027    ADVANCE CARE PLANNING  06/15/2029    DEPRESSION ACTION PLAN  Completed    INFLUENZA VACCINE  Completed    PNEUMOCOCCAL VACCINE 50+ YEARS  Completed    ZOSTER VACCINE  Completed    COVID-19 VACCINE  Completed    HPV VACCINE  Aged Out    MENINGITIS VACCINE  Aged Out    MAMMO SCREENING  Discontinued    COLORECTAL CANCER SCREENING  Discontinued         Review of  "Systems  Constitutional, neuro, ENT, endocrine, pulmonary, cardiac, gastrointestinal, genitourinary, musculoskeletal, integument and psychiatric systems are negative, except as otherwise noted.     Objective    Exam  BP 99/63   Pulse 80   Temp 97  F (36.1  C) (Temporal)   Resp 18   Ht 1.619 m (5' 3.75\")   Wt 64.8 kg (142 lb 12.8 oz)   SpO2 98%   BMI 24.70 kg/m     Estimated body mass index is 24.7 kg/m  as calculated from the following:    Height as of this encounter: 1.619 m (5' 3.75\").    Weight as of this encounter: 64.8 kg (142 lb 12.8 oz).    Physical Exam  GENERAL: alert and no distress  EYES: Eyes grossly normal to inspection, PERRL and conjunctivae and sclerae normal  HENT: ear canals and TM's normal, nose and mouth without ulcers or lesions  NECK: no adenopathy, no asymmetry, masses, or scars  RESP: lungs clear to auscultation - no rales, rhonchi or wheezes  CV: regular rate and rhythm, normal S1 S2, no S3 or S4, no murmur, click or rub, no peripheral edema  ABDOMEN: soft, nontender, no hepatosplenomegaly, no masses and bowel sounds normal  MS: no gross musculoskeletal defects noted, no edema  PSYCH: mentation appears normal, affect normal/bright         6/17/2025   Mini Cog   Clock Draw Score 2 Normal   3 Item Recall 2 objects recalled   Mini Cog Total Score 4              Signed Electronically by: Roberta Delong MD    Answers submitted by the patient for this visit:  Patient Health Questionnaire (Submitted on 6/17/2025)  If you checked off any problems, how difficult have these problems made it for you to do your work, take care of things at home, or get along with other people?: Somewhat difficult  PHQ9 TOTAL SCORE: 7  Patient Health Questionnaire (G7) (Submitted on 6/17/2025)  SHARMAINE 7 TOTAL SCORE: 3    "

## 2025-06-17 NOTE — PATIENT INSTRUCTIONS
Patient Education   Preventive Care Advice   This is general advice given by our system to help you stay healthy. However, your care team may have specific advice just for you. Please talk to your care team about your preventive care needs.  Nutrition  Eat 5 or more servings of fruits and vegetables each day.  Try wheat bread, brown rice and whole grain pasta (instead of white bread, rice, and pasta).  Get enough calcium and vitamin D. Check the label on foods and aim for 100% of the RDA (recommended daily allowance).  Lifestyle  Exercise at least 150 minutes each week  (30 minutes a day, 5 days a week).  Do muscle strengthening activities 2 days a week. These help control your weight and prevent disease.  No smoking.  Wear sunscreen to prevent skin cancer.  Have a dental exam and cleaning every 6 months.  Yearly exams  See your health care team every year to talk about:  Any changes in your health.  Any medicines your care team has prescribed.  Preventive care, family planning, and ways to prevent chronic diseases.  Shots (vaccines)   HPV shots (up to age 26), if you've never had them before.  Hepatitis B shots (up to age 59), if you've never had them before.  COVID-19 shot: Get this shot when it's due.  Flu shot: Get a flu shot every year.  Tetanus shot: Get a tetanus shot every 10 years.  Pneumococcal, hepatitis A, and RSV shots: Ask your care team if you need these based on your risk.  Shingles shot (for age 50 and up)  General health tests  Diabetes screening:  Starting at age 35, Get screened for diabetes at least every 3 years.  If you are younger than age 35, ask your care team if you should be screened for diabetes.  Cholesterol test: At age 39, start having a cholesterol test every 5 years, or more often if advised.  Bone density scan (DEXA): At age 50, ask your care team if you should have this scan for osteoporosis (brittle bones).  Hepatitis C: Get tested at least once in your life.  STIs (sexually  transmitted infections)  Before age 24: Ask your care team if you should be screened for STIs.  After age 24: Get screened for STIs if you're at risk. You are at risk for STIs (including HIV) if:  You are sexually active with more than one person.  You don't use condoms every time.  You or a partner was diagnosed with a sexually transmitted infection.  If you are at risk for HIV, ask about PrEP medicine to prevent HIV.  Get tested for HIV at least once in your life, whether you are at risk for HIV or not.  Cancer screening tests  Cervical cancer screening: If you have a cervix, begin getting regular cervical cancer screening tests starting at age 21.  Breast cancer scan (mammogram): If you've ever had breasts, begin having regular mammograms starting at age 40. This is a scan to check for breast cancer.  Colon cancer screening: It is important to start screening for colon cancer at age 45.  Have a colonoscopy test every 10 years (or more often if you're at risk) Or, ask your provider about stool tests like a FIT test every year or Cologuard test every 3 years.  To learn more about your testing options, visit:   .  For help making a decision, visit:   https://bit.ly/yj04820.  Prostate cancer screening test: If you have a prostate, ask your care team if a prostate cancer screening test (PSA) at age 55 is right for you.  Lung cancer screening: If you are a current or former smoker ages 50 to 80, ask your care team if ongoing lung cancer screenings are right for you.  For informational purposes only. Not to replace the advice of your health care provider. Copyright   2023 [facility]. All rights reserved. Clinically reviewed by the [facility] Transitions Program. [employer] [id number] - REV 01/24.  Preventing Falls: Care Instructions  Injuries and health problems such as trouble walking or poor eyesight can increase your risk of falling. So can some medicines. But there are things you can do to help  "prevent falls. You can exercise to get stronger. You can also arrange your home to make it safer.    Talk to your doctor about the medicines you take. Ask if any of them increase the risk of falls and whether they can be changed or stopped.   Try to exercise regularly. It can help improve your strength and balance. This can help lower your risk of falling.         Practice fall safety and prevention.   Wear low-heeled shoes that fit well and give your feet good support. Talk to your doctor if you have foot problems that make this hard.  Carry a cellphone or wear a medical alert device that you can use to call for help.  Use stepladders instead of chairs to reach high objects. Don't climb if you're at risk for falls. Ask for help, if needed.  Wear the correct eyeglasses, if you need them.        Make your home safer.   Remove rugs, cords, clutter, and furniture from walkways.  Keep your house well lit. Use night-lights in hallways and bathrooms.  Install and use sturdy handrails on stairways.  Wear nonskid footwear, even inside. Don't walk barefoot or in socks without shoes.        Be safe outside.   Use handrails, curb cuts, and ramps whenever possible.  Keep your hands free by using a shoulder bag or backpack.  Try to walk in well-lit areas. Watch out for uneven ground, changes in pavement, and debris.  Be careful in the winter. Walk on the grass or gravel when sidewalks are slippery. Use de-icer on steps and walkways. Add non-slip devices to shoes.    Put grab bars and nonskid mats in your shower or tub and near the toilet. Try to use a shower chair or bath bench when bathing.   Get into a tub or shower by putting in your weaker leg first. Get out with your strong side first. Have a phone or medical alert device in the bathroom with you.   Where can you learn more?  Go to https://www.[employer].net/patiented  Enter G117 in the search box to learn more about \"Preventing Falls: Care Instructions.\"  Current as of: " July 31, 2024  Content Version: 14.5    2745-8541 [employer].   Care instructions adapted under license by your healthcare professional. If you have questions about a medical condition or this instruction, always ask your healthcare professional. [employer] disclaims any warranty or liability for your use of this information.    Hearing Loss: Care Instructions  Overview     Hearing loss is a sudden or slow decrease in how well you hear. It can range from slight to profound. Permanent hearing loss can occur with aging. It also can happen when you are exposed long-term to loud noise. Examples include listening to loud music, riding motorcycles, or being around other loud machines.  Hearing loss can affect your work and home life. It can make you feel lonely or depressed. You may feel that you have lost your independence. But hearing aids and other devices can help you hear better and feel connected to others.  Follow-up care is a key part of your treatment and safety. Be sure to make and go to all appointments, and call your doctor if you are having problems. It's also a good idea to know your test results and keep a list of the medicines you take.  How can you care for yourself at home?  Avoid loud noises whenever possible. This helps keep your hearing from getting worse.  Always wear hearing protection around loud noises.  Wear a hearing aid as directed.  A professional can help you pick a hearing aid that will work best for you.  You can also get hearing aids over the counter for mild to moderate hearing loss.  Have hearing tests as your doctor suggests. They can show whether your hearing has changed. Your hearing aid may need to be adjusted.  Use other devices as needed. These may include:  Telephone amplifiers and hearing aids that can connect to a television, stereo, radio, or microphone.  Devices that use lights or vibrations. These alert you to the doorbell, a ringing telephone, or a  "baby monitor.  Television closed-captioning. This shows the words at the bottom of the screen. Most new TVs can do this.  TTY (text telephone). This lets you type messages back and forth on the telephone instead of talking or listening. These devices are also called TDD. When messages are typed on the keyboard, they are sent over the phone line to a receiving TTY. The message is shown on a monitor.  Use text messaging, social media, and email if it is hard for you to communicate by telephone.  Try to learn a listening technique called speechreading. It is not lipreading. You pay attention to people's gestures, expressions, posture, and tone of voice. These clues can help you understand what a person is saying. Face the person you are talking to, and have them face you. Make sure the lighting is good. You need to see the other person's face clearly.  Think about counseling if you need help to adjust to your hearing loss.  When should you call for help?  Watch closely for changes in your health, and be sure to contact your doctor if:    You think your hearing is getting worse.     You have new symptoms, such as dizziness or nausea.   Where can you learn more?  Go to https://www.[employer].net/patiented  Enter R798 in the search box to learn more about \"Hearing Loss: Care Instructions.\"  Current as of: October 27, 2024  Content Version: 14.5 2024-2025 [employer].   Care instructions adapted under license by your healthcare professional. If you have questions about a medical condition or this instruction, always ask your healthcare professional. [employer] disclaims any warranty or liability for your use of this information.    Bladder Training: Care Instructions  Your Care Instructions     Bladder training is used to treat urge incontinence and stress incontinence. Urge incontinence means that the need to urinate comes on so fast that you can't get to a toilet in time. Stress incontinence " means that you leak urine because of pressure on your bladder. For example, it may happen when you laugh, cough, or lift something heavy.  Bladder training can increase how long you can wait before you have to urinate. It can also help your bladder hold more urine. And it can give you better control over the urge to urinate.  It is important to remember that bladder training takes a few weeks to a few months to make a difference. You may not see results right away, but don't give up.  Follow-up care is a key part of your treatment and safety. Be sure to make and go to all appointments, and call your doctor if you are having problems. It's also a good idea to know your test results and keep a list of the medicines you take.  How can you care for yourself at home?  Work with your doctor to come up with a bladder training program that is right for you. You may use one or more of the following methods.  Delayed urination  In the beginning, try to keep from urinating for 5 minutes after you first feel the need to go.  While you wait, take deep, slow breaths to relax. Kegel exercises can also help you delay the need to go to the bathroom.  After some practice, when you can easily wait 5 minutes to urinate, try to wait 10 minutes before you urinate.  Slowly increase the waiting period until you are able to control when you have to urinate.  Scheduled urination  Empty your bladder when you first wake up in the morning.  Schedule times throughout the day when you will urinate.  Start by going to the bathroom every hour, even if you don't need to go.  Slowly increase the time between trips to the bathroom.  When you have found a schedule that works well for you, keep doing it.  If you wake up during the night and have to urinate, do it. Apply your schedule to waking hours only.  Kegel exercises  These tighten and strengthen pelvic muscles, which can help you control the flow of urine. (If doing these exercises causes pain,  "stop doing them and talk with your doctor.) To do Kegel exercises:  Squeeze your muscles as if you were trying not to pass gas. Or squeeze your muscles as if you were stopping the flow of urine. Your belly, legs, and buttocks shouldn't move.  Hold the squeeze for 3 seconds, then relax for 5 to 10 seconds.  Start with 3 seconds, then add 1 second each week until you are able to squeeze for 10 seconds.  Repeat the exercise 10 times a session. Do 3 to 8 sessions a day.  When should you call for help?  Watch closely for changes in your health, and be sure to contact your doctor if:    Your incontinence is getting worse.     You do not get better as expected.   Where can you learn more?  Go to https://www.[employer].net/patiented  Enter V684 in the search box to learn more about \"Bladder Training: Care Instructions.\"  Current as of: April 30, 2024  Content Version: 14.5    4612-6091 [employer].   Care instructions adapted under license by your healthcare professional. If you have questions about a medical condition or this instruction, always ask your healthcare professional. [employer] disclaims any warranty or liability for your use of this information.    Learning About Depression Screening  What is depression screening?  Depression screening is a way to see if you have depression symptoms. It may be done by a doctor or counselor. It's often part of a routine checkup. That's because your mental health is just as important as your physical health.  Depression is a mental health condition that affects how you feel, think, and act. You may:  Have less energy.  Lose interest in your daily activities.  Feel sad and grouchy for a long time.  Depression is very common. It affects people of all ages.  Many things can lead to depression. Some people become depressed after they have a stroke or find out they have a major illness like cancer or heart disease. The death of a loved one or a breakup may lead " "to depression. It can run in families. Most experts believe that a combination of inherited genes and stressful life events can cause it.  What happens during screening?  You may be asked to fill out a form about your depression symptoms. You and the doctor will discuss your answers. The doctor may ask you more questions to learn more about how you think, act, and feel.  What happens after screening?  If you have symptoms of depression, your doctor will talk to you about your options.  Doctors usually treat depression with medicines or counseling. Often, combining the two works best. Many people don't get help because they think that they'll get over the depression on their own. But people with depression may not get better unless they get treatment.  The cause of depression is not well understood. There may be many factors involved. But if you have depression, it's not your fault.  A serious symptom of depression is thinking about death or suicide. If you or someone you care about talks about this or about feeling hopeless, get help right away.  It's important to know that depression can be treated. Medicine, counseling, and self-care may help.  Where can you learn more?  Go to https://www.[employer].net/patiented  Enter T185 in the search box to learn more about \"Learning About Depression Screening.\"  Current as of: July 31, 2024  Content Version: 14.5 2024-2025 [employer].   Care instructions adapted under license by your healthcare professional. If you have questions about a medical condition or this instruction, always ask your healthcare professional. [employer] disclaims any warranty or liability for your use of this information.       "

## 2025-08-31 DIAGNOSIS — E78.5 HYPERLIPIDEMIA WITH TARGET LDL LESS THAN 130: ICD-10-CM

## 2025-09-02 RX ORDER — ATORVASTATIN CALCIUM 20 MG/1
20 TABLET, FILM COATED ORAL DAILY
Qty: 90 TABLET | Refills: 2 | Status: SHIPPED | OUTPATIENT
Start: 2025-09-02

## (undated) DEVICE — GLOVE PROTEXIS MICRO 6.0  2D73PM60

## (undated) DEVICE — TRAY PAIN INJECTION 97A 640

## (undated) DEVICE — NDL SPINAL 22GA 3.5" QUINCKE 405181

## (undated) DEVICE — PREP CHLORAPREP W/ORANGE TINT 10.5ML 260715